# Patient Record
Sex: MALE | Race: WHITE | Employment: OTHER | URBAN - METROPOLITAN AREA
[De-identification: names, ages, dates, MRNs, and addresses within clinical notes are randomized per-mention and may not be internally consistent; named-entity substitution may affect disease eponyms.]

---

## 2018-08-07 ENCOUNTER — HOSPITAL ENCOUNTER (INPATIENT)
Age: 78
LOS: 2 days | Discharge: HOME | DRG: 247 | End: 2018-08-10
Attending: STUDENT IN AN ORGANIZED HEALTH CARE EDUCATION/TRAINING PROGRAM | Admitting: STUDENT IN AN ORGANIZED HEALTH CARE EDUCATION/TRAINING PROGRAM
Payer: MEDICARE

## 2018-08-07 PROCEDURE — 77030004532 HC CATH ANGI DX IMP BSC -A

## 2018-08-07 PROCEDURE — C1894 INTRO/SHEATH, NON-LASER: HCPCS

## 2018-08-07 PROCEDURE — 75810000275 HC EMERGENCY DEPT VISIT NO LEVEL OF CARE

## 2018-08-07 PROCEDURE — 77030013715 HC INFL SYS MRTM -B

## 2018-08-07 PROCEDURE — 77030019569 HC BND COMPR RAD TERU -B

## 2018-08-07 PROCEDURE — 77030010221 HC SPLNT WR POS TELE -B

## 2018-08-07 PROCEDURE — C1887 CATHETER, GUIDING: HCPCS

## 2018-08-07 RX ORDER — CLOPIDOGREL 300 MG/1
300-600 TABLET, FILM COATED ORAL AS NEEDED
Status: DISCONTINUED | OUTPATIENT
Start: 2018-08-07 | End: 2018-08-08

## 2018-08-07 RX ORDER — MIDAZOLAM HYDROCHLORIDE 1 MG/ML
.5-1 INJECTION, SOLUTION INTRAMUSCULAR; INTRAVENOUS AS NEEDED
Status: DISCONTINUED | OUTPATIENT
Start: 2018-08-07 | End: 2018-08-08

## 2018-08-07 RX ORDER — FENTANYL CITRATE 50 UG/ML
25-200 INJECTION, SOLUTION INTRAMUSCULAR; INTRAVENOUS AS NEEDED
Status: DISCONTINUED | OUTPATIENT
Start: 2018-08-07 | End: 2018-08-08

## 2018-08-07 RX ORDER — SODIUM CHLORIDE 9 MG/ML
3 INJECTION, SOLUTION INTRAVENOUS CONTINUOUS
Status: DISPENSED | OUTPATIENT
Start: 2018-08-08 | End: 2018-08-08

## 2018-08-07 RX ORDER — SODIUM CHLORIDE 0.9 % (FLUSH) 0.9 %
5-10 SYRINGE (ML) INJECTION AS NEEDED
Status: DISCONTINUED | OUTPATIENT
Start: 2018-08-07 | End: 2018-08-08

## 2018-08-07 RX ORDER — LIDOCAINE HYDROCHLORIDE 10 MG/ML
10-30 INJECTION INFILTRATION; PERINEURAL
Status: DISCONTINUED | OUTPATIENT
Start: 2018-08-07 | End: 2018-08-08

## 2018-08-07 RX ORDER — PRASUGREL 10 MG/1
10-60 TABLET, FILM COATED ORAL AS NEEDED
Status: DISCONTINUED | OUTPATIENT
Start: 2018-08-07 | End: 2018-08-08

## 2018-08-07 RX ORDER — VERAPAMIL HYDROCHLORIDE 2.5 MG/ML
2.5-5 INJECTION, SOLUTION INTRAVENOUS AS NEEDED
Status: DISCONTINUED | OUTPATIENT
Start: 2018-08-07 | End: 2018-08-08

## 2018-08-07 RX ORDER — HEPARIN SODIUM 1000 [USP'U]/ML
1000-10000 INJECTION, SOLUTION INTRAVENOUS; SUBCUTANEOUS AS NEEDED
Status: DISCONTINUED | OUTPATIENT
Start: 2018-08-07 | End: 2018-08-08

## 2018-08-07 RX ORDER — SODIUM CHLORIDE 9 MG/ML
1.5 INJECTION, SOLUTION INTRAVENOUS CONTINUOUS
Status: DISCONTINUED | OUTPATIENT
Start: 2018-08-08 | End: 2018-08-08 | Stop reason: HOSPADM

## 2018-08-07 RX ORDER — ATROPINE SULFATE 0.1 MG/ML
.5-1 INJECTION INTRAVENOUS AS NEEDED
Status: DISCONTINUED | OUTPATIENT
Start: 2018-08-07 | End: 2018-08-08

## 2018-08-07 RX ORDER — HEPARIN SODIUM 200 [USP'U]/100ML
2000 INJECTION, SOLUTION INTRAVENOUS AS NEEDED
Status: DISCONTINUED | OUTPATIENT
Start: 2018-08-07 | End: 2018-08-08

## 2018-08-07 NOTE — IP AVS SNAPSHOT
[address] [place] [address] 
[phone] Patient: Johnny Obrien MRN: [id number] [facility]:[id number] About your hospitalization You were admitted on:  August 8, 2018 You last received care in the:  [facility] 4 CV SERVICES UNIT You were discharged on:  August 10, 2018 Why you were hospitalized Your primary diagnosis was:  Not on File Your diagnoses also included:  Stemi (St Elevation Myocardial Infarction) (Hcc), Stemi Involving Right Coronary Artery (Hcc) Follow-up Information Follow up With Details Comments Contact Info [address] [place] [address]  one time skilled nursing visit - nurse will call you to set up date/time [facility] [address] 
[phone] Rebeca Hurt NP On 8/15/2018 Hospital f/u PCP appointment Wednesday, 8/15/18 @ 10:30 a.m.  [address] 
[phone] Alf Bradley MD   [address] 
[phone] Your Scheduled Appointments Wednesday August 22, 2018  9:20 AM EDT New Patient with Alf Bradley MD  
[facility] Scar Samson [address] [place] [address] 1301 [facility] 67 [address] [phone] Discharge Orders None A check kory indicates which time of day the medication should be taken. My Medications START taking these medications Instructions Each Dose to Equal  
 Morning Noon Evening Bedtime  
 aspirin 81 mg chewable tablet Start taking on:  8/11/2018 Your last dose was: Your next dose is: Take 1 Tab by mouth daily. 81 mg  
    
   
   
   
  
 atorvastatin 40 mg tablet Commonly known as:  LIPITOR Your last dose was: Your next dose is: Take 1 Tab by mouth nightly. 40 mg  
    
   
   
   
  
 lisinopril 5 mg tablet Commonly known as:  Taylor Meza Start taking on:  8/11/2018 Your last dose was: Your next dose is: Take 1 Tab by mouth daily. 5 mg  
    
   
   
   
  
 ticagrelor 90 mg tablet Commonly known as:  [employer] Your last dose was: Your next dose is: Take 1 Tab by mouth every twelve (12) hours every twelve (12) hours. 90 mg  
    
   
   
   
  
  
STOP taking these medications   
 dilTIAZem 60 mg tablet Commonly known as:  CARDIZEM  
   
  
 lovastatin 20 mg tablet Commonly known as:  MEVACOR  
   
  
 MICARDIS PO Where to Get Your Medications These medications were sent to [facility] [address]  030 [facility] 42 [address] Phone:  [phone]  
  aspirin 81 mg chewable tablet  
 atorvastatin 40 mg tablet  
 lisinopril 5 mg tablet Information on where to get these meds will be given to you by the nurse or doctor. ! Ask your nurse or doctor about these medications  
  ticagrelor 90 mg tablet Discharge Instructions Do not return to work for 1 week. 10 lb weight restriction for 1 week then 50 lb weight lifting restriction You must NOT stop Brillinta without consent from cardiologist.  
 
   
 
 
 
 
Admission Diagnoses: STEMI (ST elevation myocardial infarction) ([facility] STEMI involving right coronary artery ([facility] Discharge Diagnoses: Active Problems: STEMI (ST elevation myocardial infarction) ([address] (8/8/2018) STEMI involving right coronary artery ([facility] (8/8/2018) CARDIAC CATHETERIZATION/PCI DISCHARGE INSTRUCTIONS It is normal to feel tired the first couple days. Take it easy and follow the physicians instructions. CHECK THE CATHETER INSERTION SITE DAILY: 
 
You may shower 24 hours after the procedure, remove the bandage during showering. Wash with soap and water and pat dry. Gentle cleaning of the site with soap and water is sufficient, cover with a dry clean dressing or bandage. Do not apply creams or powders to the area. Do not sit in a bathtub or pool of water for 7 days or until wound has completely healed. Temporary bruising and discomfort is normal and may last a few weeks. You may have a  formation of a small lump at the site which may last up to 6 weeks. CALL THE PHYSICIANS: 
 
If the site becomes red, swollen or feels warm to the touch If there is bleeding or drainage or if there is unusual pain at the groin or down the leg. If there is any bleeding, lie down, apply pressure or have someone apply pressure with a clean cloth until the bleeding stops. If the bleeding continues, call 911 to be transported to the hospital. 
DO  [facility] [address]. ACTIVITY: 
 
For the first 24-48 hours or as instructed by the physician: No lifting, pushing or pulling over 10 pounds and no straining the insertion site. Do not life grocery bags or the garbage can, do not run the vacuum  or  for 7 days. Start with short walks as in the hospital and gradually increase as tolerated each day. It is recommended to walk 30 minutes 5-7 days per week. Follow your physicians instructions on activity. Avoid walking outside in extremes of heat or cold. Walk inside when it is cold and windy or hot and humid. Things to keep in mind: 
No driving for at least 24 hours, or as designated by your physician. Limit the number of times you go up and down the stairs Take rests and pace yourself with activity. Be careful and do not strain with bowel movements. MEDICATIONS: 
 
Take all medications as prescribed Call your physician if you have any questions Keep an updated list of your medications with you at all times and give a list to your physician and pharmacist 
 
 
 
SIGNS AND SYMPTOMS: 
 
 Be cautious of symptoms of angina or recurrent symptoms such as chest discomfort, unusual shortness of breath or fatigue. These could be symptoms of restenosis, a new blockage or a heart attack. If your symptoms are relieved with rest it is still recommended that you notify your physician of recurrent chest pain or discomfort. FOR CHEST PAIN or symptoms of angina not relieved with rest:  If the discomfort is not relieved with rest, and you have been prescribed Nitroglycerin, take as directed (taken under the tongue, one at a time 5 minutes apart for a total of 3 doses). If the discomfort is not relieved after the 3rd nitroglycerin, call 911. If you have not been prescribed Nitroglycerin  and your chest discomfort is not relieved with rest, call 911. AFTER CARE: 
 
Follow up with your physician as instructed. Follow a heart healthy diet with proper portion control, daily stress management, daily exercise, blood pressure and cholesterol control , and smoking cessation. [employer] Announcement We are excited to announce that we are making your provider's discharge notes available to you in [employer]. You will see these notes when they are completed and signed by the physician that discharged you from your recent hospital stay. If you have any questions or concerns about any information you see in [employer], please call the Health Information Department where you were seen or reach out to your Primary Care Provider for more information about your plan of care. Introducing [facility]! Michael Garibay introduces [employer] patient portal. Now you can access parts of your medical record, email your doctor's office, and request medication refills online. 1. In your internet browser, go to [email or web address] 2. Click on the First Time User? Click Here link in the Sign In box. You will see the New Member Sign Up page. 3. Enter your [employer] Access Code exactly as it appears below. You will not need to use this code after youve completed the sign-up process. If you do not sign up before the expiration date, you must request a new code. · [employer] Access Code: [email or web address] Expires: 11/5/2018 10:13 PM 
 
4. Enter the last four digits of your Social Security Number (xxxx) and Date of Birth (mm/dd/yyyy) as indicated and click Submit. You will be taken to the next sign-up page. 5. Create a [employer] ID. This will be your [employer] login ID and cannot be changed, so think of one that is secure and easy to remember. 6. Create a [employer] password. You can change your password at any time. 7. Enter your Password Reset Question and Answer. This can be used at a later time if you forget your password. 8. Enter your e-mail address. You will receive e-mail notification when new information is available in [facility]. 9. Click Sign Up. You can now view and download portions of your medical record. 10. Click the Download Summary menu link to download a portable copy of your medical information. If you have questions, please visit the Frequently Asked Questions section of the [employer] website. Remember, [employer] is NOT to be used for urgent needs. For medical emergencies, dial 911. Now available from your iPhone and Android! Introducing Aries Schmidt As a [employer] patient, I wanted to make you aware of our electronic visit tool called Aries Dumonttrishafederico. [employer] 24/7 allows you to connect within minutes with a medical provider 24 hours a day, seven days a week via a mobile device or tablet or logging into a secure website from your computer. You can access Aries Schmidt from anywhere in the [place].  
 
A virtual visit might be right for you when you have a simple condition and feel like you just dont want to get out of bed, or cant get away from work for an appointment, when your regular [facility] provider is not available (evenings, weekends or holidays), or when youre out of town and need minor care. Electronic visits cost only $49 and if the [employer] 24/7 provider determines a prescription is needed to treat your condition, one can be electronically transmitted to a nearby pharmacy*. Please take a moment to enroll today if you have not already done so. The enrollment process is free and takes just a few minutes. To enroll, please download the [employer] hanny to your tablet or phone, or visit [email or web address] to enroll on your computer. And, as an [address] [place] [address] patient with a [employer] account, the results of your visits will be scanned into your electronic medical record and your primary care provider will be able to view the scanned results. We urge you to continue to see your regular [facility] provider for your ongoing medical care. And while your primary care provider may not be the one available when you seek a [employer] virtual visit, the peace of mind you get from getting a real diagnosis real time can be priceless. For more information on [employer], view our Frequently Asked Questions (FAQs) at [email or web address]. Sincerely, 
 
Jaja Adams MD 
Chief Medical Officer Nhan Kelly Aguillon *:  certain medications cannot be prescribed via [employer] Providers Seen During Your Hospitalization Provider Specialty Primary office phone Rodolfo Keita DO Cardiology [phone] Your Primary Care Physician (PCP) Primary Care Physician Office Phone Office Fax Kai Concepcion [phone] You are allergic to the following No active allergies Recent Documentation Weight BMI Smoking Status 76.4 kg 24.87 kg/m2 Former Smoker Emergency Contacts Name Discharge Info Relation Home Work Mobile Melia Peter DISCHARGE CAREGIVER [3] Daughter [21] [phone] Iza Lowery  Daughter [21] [phone] Gris Jimenez  Daughter [21] [phone] Patient Belongings The following personal items are in your possession at time of discharge: 
  Dental Appliances: None  Visual Aid: None      Home Medications: None   Jewelry: None  Clothing: At bedside Please provide this summary of care documentation to your next provider. Signatures-by signing, you are acknowledging that this After Visit Summary has been reviewed with you and you have received a copy. Patient Signature:  ____________________________________________________________ Date:  ____________________________________________________________  
  
Clau Karena Provider Signature:  ____________________________________________________________ Date:  ____________________________________________________________

## 2018-08-08 PROBLEM — I21.11 STEMI INVOLVING RIGHT CORONARY ARTERY (HCC): Status: ACTIVE | Noted: 2018-08-08

## 2018-08-08 PROBLEM — I21.3 STEMI (ST ELEVATION MYOCARDIAL INFARCTION) (HCC): Status: ACTIVE | Noted: 2018-08-08

## 2018-08-08 LAB
ALBUMIN SERPL-MCNC: 2.9 G/DL (ref 3.5–5)
ALBUMIN/GLOB SERPL: 0.9 {RATIO} (ref 1.1–2.2)
ALP SERPL-CCNC: 76 U/L (ref 45–117)
ALT SERPL-CCNC: 28 U/L (ref 12–78)
ANION GAP SERPL CALC-SCNC: 8 MMOL/L (ref 5–15)
AST SERPL-CCNC: 110 U/L (ref 15–37)
ATRIAL RATE: 67 BPM
BILIRUB DIRECT SERPL-MCNC: <0.1 MG/DL (ref 0–0.2)
BILIRUB SERPL-MCNC: 0.3 MG/DL (ref 0.2–1)
BUN SERPL-MCNC: 16 MG/DL (ref 6–20)
BUN/CREAT SERPL: 20 (ref 12–20)
CALCIUM SERPL-MCNC: 9.1 MG/DL (ref 8.5–10.1)
CALCULATED P AXIS, ECG09: 59 DEGREES
CALCULATED R AXIS, ECG10: -7 DEGREES
CALCULATED T AXIS, ECG11: -26 DEGREES
CHLORIDE SERPL-SCNC: 110 MMOL/L (ref 97–108)
CO2 SERPL-SCNC: 23 MMOL/L (ref 21–32)
CREAT SERPL-MCNC: 0.81 MG/DL (ref 0.7–1.3)
DIAGNOSIS, 93000: NORMAL
ERYTHROCYTE [DISTWIDTH] IN BLOOD BY AUTOMATED COUNT: 13.7 % (ref 11.5–14.5)
EST. AVERAGE GLUCOSE BLD GHB EST-MCNC: 117 MG/DL
GLOBULIN SER CALC-MCNC: 3.3 G/DL (ref 2–4)
GLUCOSE SERPL-MCNC: 133 MG/DL (ref 65–100)
HBA1C MFR BLD: 5.7 % (ref 4.2–6.3)
HCT VFR BLD AUTO: 38.3 % (ref 36.6–50.3)
HGB BLD-MCNC: 12.2 G/DL (ref 12.1–17)
INR PPP: 1 (ref 0.9–1.1)
MAGNESIUM SERPL-MCNC: 2.1 MG/DL (ref 1.6–2.4)
MCH RBC QN AUTO: 30.2 PG (ref 26–34)
MCHC RBC AUTO-ENTMCNC: 31.9 G/DL (ref 30–36.5)
MCV RBC AUTO: 94.8 FL (ref 80–99)
NRBC # BLD: 0 K/UL (ref 0–0.01)
NRBC BLD-RTO: 0 PER 100 WBC
P-R INTERVAL, ECG05: 202 MS
PLATELET # BLD AUTO: 277 K/UL (ref 150–400)
PMV BLD AUTO: 9.9 FL (ref 8.9–12.9)
POTASSIUM SERPL-SCNC: 4 MMOL/L (ref 3.5–5.1)
PROT SERPL-MCNC: 6.2 G/DL (ref 6.4–8.2)
PROTHROMBIN TIME: 10.7 SEC (ref 9–11.1)
Q-T INTERVAL, ECG07: 406 MS
QRS DURATION, ECG06: 100 MS
QTC CALCULATION (BEZET), ECG08: 429 MS
RBC # BLD AUTO: 4.04 M/UL (ref 4.1–5.7)
SODIUM SERPL-SCNC: 141 MMOL/L (ref 136–145)
TROPONIN I SERPL-MCNC: 33.6 NG/ML
VENTRICULAR RATE, ECG03: 67 BPM
WBC # BLD AUTO: 7.6 K/UL (ref 4.1–11.1)

## 2018-08-08 PROCEDURE — 74011250637 HC RX REV CODE- 250/637: Performed by: STUDENT IN AN ORGANIZED HEALTH CARE EDUCATION/TRAINING PROGRAM

## 2018-08-08 PROCEDURE — 77030013744

## 2018-08-08 PROCEDURE — 83036 HEMOGLOBIN GLYCOSYLATED A1C: CPT | Performed by: STUDENT IN AN ORGANIZED HEALTH CARE EDUCATION/TRAINING PROGRAM

## 2018-08-08 PROCEDURE — C1769 GUIDE WIRE: HCPCS

## 2018-08-08 PROCEDURE — 85610 PROTHROMBIN TIME: CPT | Performed by: STUDENT IN AN ORGANIZED HEALTH CARE EDUCATION/TRAINING PROGRAM

## 2018-08-08 PROCEDURE — 93306 TTE W/DOPPLER COMPLETE: CPT

## 2018-08-08 PROCEDURE — 4A023N7 MEASUREMENT OF CARDIAC SAMPLING AND PRESSURE, LEFT HEART, PERCUTANEOUS APPROACH: ICD-10-PCS | Performed by: STUDENT IN AN ORGANIZED HEALTH CARE EDUCATION/TRAINING PROGRAM

## 2018-08-08 PROCEDURE — 80076 HEPATIC FUNCTION PANEL: CPT | Performed by: STUDENT IN AN ORGANIZED HEALTH CARE EDUCATION/TRAINING PROGRAM

## 2018-08-08 PROCEDURE — 85347 COAGULATION TIME ACTIVATED: CPT

## 2018-08-08 PROCEDURE — B2111ZZ FLUOROSCOPY OF MULTIPLE CORONARY ARTERIES USING LOW OSMOLAR CONTRAST: ICD-10-PCS | Performed by: STUDENT IN AN ORGANIZED HEALTH CARE EDUCATION/TRAINING PROGRAM

## 2018-08-08 PROCEDURE — 65620000000 HC RM CCU GENERAL

## 2018-08-08 PROCEDURE — C1725 CATH, TRANSLUMIN NON-LASER: HCPCS

## 2018-08-08 PROCEDURE — 93005 ELECTROCARDIOGRAM TRACING: CPT

## 2018-08-08 PROCEDURE — 93458 L HRT ARTERY/VENTRICLE ANGIO: CPT

## 2018-08-08 PROCEDURE — C1874 STENT, COATED/COV W/DEL SYS: HCPCS

## 2018-08-08 PROCEDURE — 74011000250 HC RX REV CODE- 250: Performed by: STUDENT IN AN ORGANIZED HEALTH CARE EDUCATION/TRAINING PROGRAM

## 2018-08-08 PROCEDURE — 83735 ASSAY OF MAGNESIUM: CPT | Performed by: NURSE PRACTITIONER

## 2018-08-08 PROCEDURE — 74011250636 HC RX REV CODE- 250/636: Performed by: STUDENT IN AN ORGANIZED HEALTH CARE EDUCATION/TRAINING PROGRAM

## 2018-08-08 PROCEDURE — 36415 COLL VENOUS BLD VENIPUNCTURE: CPT | Performed by: STUDENT IN AN ORGANIZED HEALTH CARE EDUCATION/TRAINING PROGRAM

## 2018-08-08 PROCEDURE — 99153 MOD SED SAME PHYS/QHP EA: CPT

## 2018-08-08 PROCEDURE — 74011250636 HC RX REV CODE- 250/636: Performed by: INTERNAL MEDICINE

## 2018-08-08 PROCEDURE — 74011636320 HC RX REV CODE- 636/320: Performed by: STUDENT IN AN ORGANIZED HEALTH CARE EDUCATION/TRAINING PROGRAM

## 2018-08-08 PROCEDURE — 92941 PRQ TRLML REVSC TOT OCCL AMI: CPT

## 2018-08-08 PROCEDURE — 99152 MOD SED SAME PHYS/QHP 5/>YRS: CPT

## 2018-08-08 PROCEDURE — 84484 ASSAY OF TROPONIN QUANT: CPT | Performed by: STUDENT IN AN ORGANIZED HEALTH CARE EDUCATION/TRAINING PROGRAM

## 2018-08-08 PROCEDURE — 027034Z DILATION OF CORONARY ARTERY, ONE ARTERY WITH DRUG-ELUTING INTRALUMINAL DEVICE, PERCUTANEOUS APPROACH: ICD-10-PCS | Performed by: STUDENT IN AN ORGANIZED HEALTH CARE EDUCATION/TRAINING PROGRAM

## 2018-08-08 PROCEDURE — 80048 BASIC METABOLIC PNL TOTAL CA: CPT | Performed by: STUDENT IN AN ORGANIZED HEALTH CARE EDUCATION/TRAINING PROGRAM

## 2018-08-08 PROCEDURE — 85027 COMPLETE CBC AUTOMATED: CPT | Performed by: STUDENT IN AN ORGANIZED HEALTH CARE EDUCATION/TRAINING PROGRAM

## 2018-08-08 RX ORDER — ATORVASTATIN CALCIUM 40 MG/1
80 TABLET, FILM COATED ORAL
Status: DISCONTINUED | OUTPATIENT
Start: 2018-08-08 | End: 2018-08-10

## 2018-08-08 RX ORDER — SODIUM CHLORIDE 0.9 % (FLUSH) 0.9 %
5-10 SYRINGE (ML) INJECTION EVERY 8 HOURS
Status: DISCONTINUED | OUTPATIENT
Start: 2018-08-08 | End: 2018-08-10 | Stop reason: HOSPADM

## 2018-08-08 RX ORDER — ACETAMINOPHEN 500 MG
500 TABLET ORAL
Status: DISCONTINUED | OUTPATIENT
Start: 2018-08-08 | End: 2018-08-10 | Stop reason: HOSPADM

## 2018-08-08 RX ORDER — SODIUM CHLORIDE 9 MG/ML
1.5 INJECTION, SOLUTION INTRAVENOUS CONTINUOUS
Status: DISPENSED | OUTPATIENT
Start: 2018-08-08 | End: 2018-08-08

## 2018-08-08 RX ORDER — GUAIFENESIN 100 MG/5ML
81 LIQUID (ML) ORAL DAILY
Status: DISCONTINUED | OUTPATIENT
Start: 2018-08-09 | End: 2018-08-10 | Stop reason: HOSPADM

## 2018-08-08 RX ORDER — SODIUM CHLORIDE 0.9 % (FLUSH) 0.9 %
5-10 SYRINGE (ML) INJECTION AS NEEDED
Status: DISCONTINUED | OUTPATIENT
Start: 2018-08-08 | End: 2018-08-10 | Stop reason: HOSPADM

## 2018-08-08 RX ORDER — LIDOCAINE HYDROCHLORIDE ANHYDROUS AND DEXTROSE MONOHYDRATE .8; 5 G/100ML; G/100ML
1 INJECTION, SOLUTION INTRAVENOUS CONTINUOUS
Status: DISCONTINUED | OUTPATIENT
Start: 2018-08-08 | End: 2018-08-09

## 2018-08-08 RX ORDER — METOPROLOL SUCCINATE 25 MG/1
25 TABLET, EXTENDED RELEASE ORAL DAILY
Status: DISCONTINUED | OUTPATIENT
Start: 2018-08-08 | End: 2018-08-08

## 2018-08-08 RX ORDER — LISINOPRIL 5 MG/1
5 TABLET ORAL DAILY
Status: DISCONTINUED | OUTPATIENT
Start: 2018-08-08 | End: 2018-08-10 | Stop reason: HOSPADM

## 2018-08-08 RX ORDER — METOPROLOL TARTRATE 25 MG/1
25 TABLET, FILM COATED ORAL 2 TIMES DAILY
Status: DISCONTINUED | OUTPATIENT
Start: 2018-08-08 | End: 2018-08-08

## 2018-08-08 RX ORDER — LOVASTATIN 20 MG/1
20 TABLET ORAL DAILY
COMMUNITY
End: 2018-08-10

## 2018-08-08 RX ORDER — DILTIAZEM HYDROCHLORIDE 60 MG/1
60 TABLET, FILM COATED ORAL 2 TIMES DAILY
COMMUNITY
End: 2018-08-10

## 2018-08-08 RX ADMIN — FENTANYL CITRATE 50 MCG: 50 INJECTION, SOLUTION INTRAMUSCULAR; INTRAVENOUS at 00:23

## 2018-08-08 RX ADMIN — SODIUM CHLORIDE 1.5 ML/KG/HR: 900 INJECTION, SOLUTION INTRAVENOUS at 01:26

## 2018-08-08 RX ADMIN — Medication 10 ML: at 02:13

## 2018-08-08 RX ADMIN — Medication 10 ML: at 21:01

## 2018-08-08 RX ADMIN — LIDOCAINE HYDROCHLORIDE 3 ML: 10 INJECTION, SOLUTION INFILTRATION; PERINEURAL at 00:17

## 2018-08-08 RX ADMIN — Medication 10 ML: at 15:45

## 2018-08-08 RX ADMIN — Medication 2000 UNITS: at 00:22

## 2018-08-08 RX ADMIN — TICAGRELOR 180 MG: 90 TABLET ORAL at 02:10

## 2018-08-08 RX ADMIN — ATROPINE SULFATE 0.5 MG: 0.1 INJECTION, SOLUTION ENDOTRACHEAL; INTRAMUSCULAR; INTRAVENOUS; SUBCUTANEOUS at 00:30

## 2018-08-08 RX ADMIN — TICAGRELOR 90 MG: 90 TABLET ORAL at 15:45

## 2018-08-08 RX ADMIN — Medication 10 ML: at 21:00

## 2018-08-08 RX ADMIN — LISINOPRIL 5 MG: 5 TABLET ORAL at 08:50

## 2018-08-08 RX ADMIN — VERAPAMIL HYDROCHLORIDE 5 MG: 2.5 INJECTION, SOLUTION INTRAVENOUS at 00:05

## 2018-08-08 RX ADMIN — IOPAMIDOL 50 ML: 755 INJECTION, SOLUTION INTRAVENOUS at 00:22

## 2018-08-08 RX ADMIN — MIDAZOLAM HYDROCHLORIDE 2 MG: 1 INJECTION, SOLUTION INTRAMUSCULAR; INTRAVENOUS at 00:23

## 2018-08-08 RX ADMIN — HEPARIN SODIUM 7000 UNITS: 1000 INJECTION, SOLUTION INTRAVENOUS; SUBCUTANEOUS at 00:23

## 2018-08-08 RX ADMIN — Medication 10 ML: at 05:59

## 2018-08-08 RX ADMIN — ATORVASTATIN CALCIUM 80 MG: 40 TABLET, FILM COATED ORAL at 21:00

## 2018-08-08 RX ADMIN — IOPAMIDOL 148 ML: 755 INJECTION, SOLUTION INTRAVENOUS at 00:49

## 2018-08-08 RX ADMIN — SODIUM CHLORIDE 1.5 ML/KG/HR: 900 INJECTION, SOLUTION INTRAVENOUS at 03:00

## 2018-08-08 RX ADMIN — LIDOCAINE HYDROCHLORIDE ANHYDROUS AND DEXTROSE MONOHYDRATE 1 MG/MIN: .8; 5 INJECTION, SOLUTION INTRAVENOUS at 15:38

## 2018-08-08 RX ADMIN — SODIUM CHLORIDE 12.58 ML/KG/HR: 900 INJECTION, SOLUTION INTRAVENOUS at 00:30

## 2018-08-08 RX ADMIN — SODIUM CHLORIDE 3 ML/KG/HR: 900 INJECTION, SOLUTION INTRAVENOUS at 00:00

## 2018-08-08 NOTE — H&P
Alfredo Brooks DO  [facility] [address] [place] [address], [address] [facility], [address] [place] [address]                                       Office [phone],[facility] [phone]    Cardiology Admission Note      2018 11:58 PM  Lord Caridad NP  :  [id number]   MRN:  [id number]         Subjective:   STEMI (ST elevation myocardial infarction) ([address]    Anisa Dumont is a 68 y.o. male presented to [address] [place] [address] ED with 2 hours of sudden onset of substernal chest pain. No prior hx of CAD. No symptoms until abrupt onset. On arrival to ED, found to have ST elevations in inferior leads. Was given SL nitro, heparin bolus and gtt, 600mg plavix and started on nitro gtt. On arrival to [facility] continued to have 1/10 substernal chest pain. Catheterization showed 100% stenosis of distal RCA with dye hangup and collateral flow to distal vessel from left coronary artery. Underwent successful PCI with a 2.5x18mm Hanahan BARRIE. No Known Allergies      Past Medical History:   Diagnosis Date    Hypercholesterolemia     Hypertension     Sciatica         History reviewed. No pertinent surgical history. Home Medications:  Prior to Admission Medications   Prescriptions Last Dose Informant Patient Reported? Taking? TELMISARTAN (MICARDIS PO)   Yes No   Sig: Take  by mouth.       Facility-Administered Medications: None       Hospital Medications:  Current Facility-Administered Medications   Medication Dose Route Frequency    sodium chloride (NS) flush 5-10 mL  5-10 mL IntraVENous Q8H    sodium chloride (NS) flush 5-10 mL  5-10 mL IntraVENous PRN    sodium chloride (NS) flush 5-10 mL  5-10 mL IntraVENous Q8H    sodium chloride (NS) flush 5-10 mL  5-10 mL IntraVENous PRN    acetaminophen (TYLENOL) tablet 500 mg  500 mg Oral Q6H PRN    metoprolol tartrate (LOPRESSOR) tablet 25 mg  25 mg Oral BID    [START ON 2018] aspirin chewable tablet 81 mg  81 mg Oral DAILY    ticagrelor (BRILINTA) tablet 180 mg  180 mg Oral ONCE    ticagrelor (BRILINTA) tablet 90 mg  90 mg Oral Q12H    atorvastatin (LIPITOR) tablet 80 mg  80 mg Oral QHS    lisinopril (PRINIVIL, ZESTRIL) tablet 5 mg  5 mg Oral DAILY       Prior to Admission Medications   Prescriptions Last Dose Informant Patient Reported? Taking? TELMISARTAN (MICARDIS PO)   Yes No   Sig: Take  by mouth. Facility-Administered Medications: None          Social History:  Social History   Substance Use Topics    Smoking status: Former Smoker    Smokeless tobacco: Never Used    Alcohol use No       Family History:  History reviewed. No pertinent family history. Review of Symptoms:  Pertinent Positive:chest pain  Pertinent Negative: shortness of breath  All Other systems reviewed and are negative for a Comprehensive ROS (10+)    Blood pressure (!) 113/7, pulse 76, resp. rate 11, SpO2 99 %. Constitutional:  well-developed and well-nourished. No distress. HENT: Normocephalic. Eyes: No scleral icterus. Neck:  Neck supple. No JVD present. Pulmonary/Chest: Effort normal and breath sounds normal. No respiratory distress, wheezes or rales. Cardiovascular: Normal rate, regular rhythm, S1 S2 . Exam reveals no gallop and no friction rub. No murmur heard. No edema. Extremities:  Normal muscle tone. Abdominal:   No abnormal distension. Neurological:  Alert and oriented. Coordination seems grossly normal.   Skin: Skin is not cold. No rash noted. Not diaphoretic. No erythema. Psychiatric:  Grossly normal mood and affect. Behavior appears normal.       Cardiac Testing/ Procedures:  EKG: sinus tachycardia, ischemic changes noted in inferior leads with ST elevations and diffuse ST segment depression. Cardiac Cath/PCI:  100% distal RCA stenosis with dye hangup.       LABS:    Lab Results   Component Value Date/Time    WBC 6.7 08/07/2018 10:30 PM    HGB 13.9 08/07/2018 10:30 PM    HCT 42.2 08/07/2018 10:30 PM    PLATELET 936 08/07/2018 10:30 PM    MCV 90.2 08/07/2018 10:30 PM     Lab Results   Component Value Date/Time    Sodium 139 08/07/2018 10:30 PM    Potassium 3.5 08/07/2018 10:30 PM    Chloride 103 08/07/2018 10:30 PM    CO2 24 08/07/2018 10:30 PM    Anion gap 12 08/07/2018 10:30 PM    Glucose 206 (H) 08/07/2018 10:30 PM    BUN 16 08/07/2018 10:30 PM    Creatinine 1.08 08/07/2018 10:30 PM    BUN/Creatinine ratio 15 08/07/2018 10:30 PM    GFR est AA >60 08/07/2018 10:30 PM    GFR est non-AA >60 08/07/2018 10:30 PM    Calcium 10.3 (H) 08/07/2018 10:30 PM     Lab Results   Component Value Date/Time    Troponin-I, Qt. 0.36 (H) 08/07/2018 10:30 PM     No results found for: APTT  No results found for: INR, PTMR, PTP, PT1, PT2     Assessment/Plan:     Patient Active Problem List   Diagnosis Code    Sciatica of left side M54.32    Hypertension I10    STEMI (ST elevation myocardial infarction) ([facility] [facility] I21.3         STEMI- distal RCA  S/p stenting with a 2.5x18mm medtronic Moises BARRIE  - s/p clopidogrel load at OSH ED, ticagrelor 180mg in am followed by 90 mg twice daily  - start low dose metorplol and acei  - lipid panel  - echo  - [facility]        I have discussed the diagnosis with the patient and the intended plan as seen in the above orders.           Arejames Flair, DO

## 2018-08-08 NOTE — PROGRESS NOTES
Reason for Admission: \"Heart Attack\"; STEMI                   RRAT Score:         12            Plan for utilizing home health:      TBD: patient wants to discuss [facility] visit with his daughters before deciding on whether or not to do the visit; NOTE: Patient lives in the [facility], so referral will need to be sent to [facility]                    Likelihood of Readmission:  Low, patient primarily uses [facility] as primary hospital                         Transition of Care Plan:                Patient was flown in from [facility] yesterday, 8/7/18. Patient received one stent. Patient will likely be transferred to a step down unit tomorrow, 8/9/18. Patient is eligible for [facility] visit but wants to discuss with his daughters first, as they are very involved with his care. Patient identified [facility] in [place] as pharmacy preference. Care Management Interventions  PCP Verified by CM: Yes (Last saw NP Gold Ludwig yesterday)  Last Visit to PCP: 08/07/18  Palliative Care Criteria Met (RRAT>21 & CHF Dx)?: No  Mode of Transport at Discharge:  Other (see comment) (Daughters will transport patient home)  Transition of Care Consult (CM Consult): Discharge Planning (Patient eligible for [facility] visit but want to discuss it with daughters)  Kenneth Senate: No  Discharge Durable Medical Equipment: No (has DME of: walker, shower stool)  Health Maintenance Reviewed: Yes (CM met with patient with patient alert and oriented x 4)  Physical Therapy Consult: No  Occupational Therapy Consult: No  Speech Therapy Consult: No  Current Support Network: Own Home, Family Lives Nearby, Lives Alone (3 daughters are very involved)  Confirm Follow Up Transport: Self (Patient is independent in ADLs, to include driving)  Plan discussed with Pt/Family/Caregiver: Yes  Freedom of Choice Offered: Yes (eligible for [facility] visit but wants to discuss with daughters first)  Lorin Provided?: No  Discharge Location  Discharge Placement: Home with family assistance (Lives alone in a single story home with 3 external steps)    CRM: Reagan Romero MPH; Z: [phone]

## 2018-08-08 NOTE — PROGRESS NOTES
Problem: Falls - Risk of  Goal: *Absence of Falls  Document Fabiola Fall Risk and appropriate interventions in the flowsheet. Outcome: Progressing Towards Goal  Fall Risk Interventions:            Medication Interventions: Assess postural VS orthostatic hypotension, Evaluate medications/consider consulting pharmacy, Patient to call before getting OOB, Teach patient to arise slowly                  Problem: Pressure Injury - Risk of  Goal: *Prevention of pressure injury  Document Flaquito Scale and appropriate interventions in the flowsheet. Outcome: Progressing Towards Goal  Pressure Injury Interventions:             Activity Interventions: Pressure redistribution bed/mattress(bed type)    Mobility Interventions: Assess need for specialty bed    Nutrition Interventions: Document food/fluid/supplement intake

## 2018-08-08 NOTE — ED TRIAGE NOTES
Transfer from [address] [place] [address], Code STEMI, Lidia Sham and cath lab in ED upon arrival.

## 2018-08-08 NOTE — CONSULTS
Cardiac Electrophysiology Consultation Note   REFERRING PROVIDER: Dr Rylan Young  Subjective:      Leeann Lechuga is a 68 y.o. patient who is seen for evaluation of PVC and NSVT and sinus bradycardia  He presented to outside hospital ER with acute STEMI inferior wall last night and was transferred here for cath which showed RCA occlusion with L to right collateral flow.  PCI was done with BARRIE Medtronic  He has no CAD in other vessel  LVEF on echo was 50% and mild MR  He has no chest pain now, nor dizziness or syncope but he has sinus bradycardia 40-50 bpm and PVC, monomorphic VT  No family hx of sudden death, pacer or ICD     Post PCI ECG showed sinus arrhythmia, inferior Q waves and PVC       Patient Active Problem List   Diagnosis Code    Sciatica of left side M54.32    Hypertension I10    STEMI (ST elevation myocardial infarction) ([facility] [address] I21.3    STEMI involving right coronary artery ([facility] [address] I21.11     Current Facility-Administered Medications on File Prior to Encounter   Medication Dose Route Frequency Provider Last Rate Last Dose    [COMPLETED] aspirin (ASPIRIN) tablet 325 mg  325 mg Oral ONCE Gae Lesches, MD   325 mg at 08/07/18 2217    [COMPLETED] nitroglycerin (NITROSTAT) tablet 0.4 mg  0.4 mg SubLINGual NOW Gae Lesches, MD   0.4 mg at 08/07/18 2226    [COMPLETED] sodium chloride 0.9 % bolus infusion 500 mL  500 mL IntraVENous NOW Gae Lesches, MD   Stopped at 08/07/18 2301    [COMPLETED] clopidogrel (PLAVIX) tablet 600 mg  600 mg Oral NOW Gae Lesches, MD   600 mg at 08/07/18 2243    [COMPLETED] metoprolol (LOPRESSOR) injection 5 mg  5 mg IntraVENous NOW Gae Lesches, MD   5 mg at 08/07/18 2244    [COMPLETED] ondansetron (ZOFRAN) injection 4 mg  4 mg IntraVENous NOW Gae Lesches, MD   4 mg at 08/07/18 2250    [COMPLETED] morphine injection 4 mg  4 mg IntraVENous NOW Gae Lesches, MD   4 mg at 08/07/18 2250    [COMPLETED] nitroglycerin (NITROSTAT) tablet 0.4 mg  0.4 mg SubLINGual NOW Arleen Severance, MD   0.4 mg at 08/07/18 2237     Current Outpatient Prescriptions on File Prior to Encounter   Medication Sig Dispense Refill    TELMISARTAN (MICARDIS PO) Take 80 mg by mouth daily. Current Facility-Administered Medications   Medication Dose Route Frequency Provider Last Rate Last Dose    sodium chloride (NS) flush 5-10 mL  5-10 mL IntraVENous Q8H [place] Heckle, DO   10 mL at 08/08/18 1545    sodium chloride (NS) flush 5-10 mL  5-10 mL IntraVENous PRN [place] Heckle, DO        sodium chloride (NS) flush 5-10 mL  5-10 mL IntraVENous Q8H [place] Heckle, DO   10 mL at 08/08/18 1545    sodium chloride (NS) flush 5-10 mL  5-10 mL IntraVENous PRN [place] Heckle, DO        acetaminophen (TYLENOL) tablet 500 mg  500 mg Oral Q6H PRN [place] Heckle, DO        [START ON 8/9/2018] aspirin chewable tablet 81 mg  81 mg Oral DAILY [place] Heckle, DO        ticagror [facility]) tablet 90 mg  90 mg Oral Q12H [place] Heckle, DO   90 mg at 08/08/18 1545    atorvastatin (LIPITOR) tablet 80 mg  80 mg Oral QHS [place] Heckle, DO   Stopped at 08/08/18 0118    lisinopril (PRINIVIL, ZESTRIL) tablet 5 mg  5 mg Oral DAILY [place] Heckle, DO   5 mg at 08/08/18 2354    lidocaine 8 mg/mL (Xylocaine) infusion  1 mg/min IntraVENous CONTINUOUS Dea Noriega MD 7.5 mL/hr at 08/08/18 1538 1 mg/min at 08/08/18 1538     No Known Allergies  Past Medical History:   Diagnosis Date    Hypercholesterolemia     Hypertension     Sciatica      No past surgical history. Pertinent family history is in HPI. Social History   Substance Use Topics    Smoking status: Former Smoker    Smokeless tobacco: Never Used    Alcohol use No        Review of Systems:   Constitutional: Negative for fever, chills, weight loss, malaise/fatigue. HEENT: Negative for nosebleeds, vision changes. Respiratory: Negative for cough, hemoptysis  Cardiovascular: Negative for chest pain, palpitations, orthopnea, claudication, leg swelling, syncope, and PND. Gastrointestinal: Negative for nausea, vomiting, diarrhea, blood in stool and melena. Genitourinary: Negative for dysuria, and hematuria. Musculoskeletal: Negative for myalgias, arthralgia. Skin: Negative for rash. Heme: Does not bleed or bruise easily. Neurological: Negative for speech change and focal weakness     Objective:     Visit Vitals    /67    Pulse (!) 43    Temp 98.4 °F (36.9 °C)    Resp 17    Wt 169 lb 12.1 oz (77 kg)    SpO2 94%    BMI 25.07 kg/m2      Physical Exam:   Constitutional: well-developed and well-nourished. No respiratory distress. Head: Normocephalic and atraumatic. Eyes: Pupils are equal, round  ENT: hearing normal  Neck: supple. No JVD present. Cardiovascular: slow rate, irregular rhythm. Exam reveals no gallop and no friction rub. No murmur heard. Pulmonary/Chest: Effort normal and breath sounds normal. No wheezes. Abdominal: Soft, no tenderness. Musculoskeletal: no edema. Neurological: alert,oriented. Skin: Skin is warm and dry  Psychiatric: normal mood and affect.  Behavior is normal. Judgment and thought content normal.      BMP:   Lab Results   Component Value Date/Time     08/08/2018 02:01 AM    K 4.0 08/08/2018 02:01 AM     (H) 08/08/2018 02:01 AM    CO2 23 08/08/2018 02:01 AM    AGAP 8 08/08/2018 02:01 AM     (H) 08/08/2018 02:01 AM    BUN 16 08/08/2018 02:01 AM    CREA 0.81 08/08/2018 02:01 AM    GFRAA >60 08/08/2018 02:01 AM    GFRNA >60 08/08/2018 02:01 AM     CBC:   Lab Results   Component Value Date/Time    WBC 7.6 08/08/2018 02:01 AM    HGB 12.2 08/08/2018 02:01 AM    HCT 38.3 08/08/2018 02:01 AM     08/08/2018 02:01 AM     COAGS:   Lab Results   Component Value Date/Time    PTP 10.7 08/08/2018 02:01 AM    INR 1.0 08/08/2018 02:01 AM       Assessment/Plan:   STEMI RCA stent  LVEF 50%  Hypertension  PVC frequent  NSVT  Sinus bradycardia    He cannot tolerate beta blocker due to severe sinus bradycardia  He does not appear to have ischemia at this time  His ECG indicated that he has transmural inferior MI and scar substrate and hence monomorphic VT. He will not tolerate beta blocker or amiodarone. I will try low dose lidocaine and monitor his sinus rate and VT burden  If he is symptomatic and VT is persistently recurrent, will consider to re-cath, or EP study and dual chamber ICD implant  If he has no more VT but severe sinus bradycardia persists with symptom: will consider dual chamber pacemaker  I have explained to him        Thank you for involving me in this patient's care and please call with further concerns or questions. Akanksha Robledo M.D.   Electrophysiology/Cardiology  [facility] [place]  [address], [address] [place] [address]  [phone]

## 2018-08-08 NOTE — PROGRESS NOTES
Problem: Discharge Planning  Goal: *Discharge to safe environment  Outcome: Progressing Towards Goal  See CM Notes CRM: Osmin Garcia, MPH; Z: [phone]

## 2018-08-08 NOTE — INTERDISCIPLINARY ROUNDS
IDR/SLIDR Summary          Patient: Darrel Reece MRN: [id number]    Age: 68 y.o. YOB: 1940 Room/Bed: [address] [place] [address]   Admit Diagnosis: STEMI (ST elevation myocardial infarction) ([facility] [address]  STEMI involving right coronary artery (HCC)  Principal Diagnosis: <principal problem not specified>   Goals: resolve MI  Readmission: NO  Quality Measure: AMI  VTE Prophylaxis: Chemical  Influenza Vaccine screening completed? NO  Pneumococcal Vaccine screening completed? NO  Mobility needs: Yes   Nutrition plan:Yes  Consults:P.T, O.T. and Case Management    Financial concerns:Yes  Escalated to CM? YES  RRAT Score: 12   Interventions:H2H  Testing due for pt today?  YES  LOS: 0 days Expected length of stay TBD days  Discharge plan: TBD   PCP: Lian Ambriz NP  Transportation needs: Yes    Days before discharge:two or more days before discharge   Discharge disposition: Home    Signed:     Rose Marie Hernandez RN  8/8/2018  7:21 AM

## 2018-08-08 NOTE — CARDIO/PULMONARY
Cardiac Rehab: MI education folder, with catheterization brochure, to bedside of [address] [place] [address]. Educated using teach back method. Reviewed MI diagnosis definition and purpose of intervention. Discussed risk factors for CAD to include the following: family history, elevated BMI, hyperlipidemia, hypertension, diabetes, stress, and smoking. Discussed Heart Healthy/Low Sodium (2000 mg) diet. Reviewed the importance of medication compliance, the purpose of brinlinta, and potential side effects. Discussed follow up appointments with cardiologist, signs and symptoms of angina, and what to report to physician after discharge. Emphasized the value of cardiac rehab. Discussed Cardiac Rehab Program format, benefits, and encouraged enrollment to assist with risk modification and management. 2000 Jaron Wiley lives out of the area without a Cardiac Rehab nearby so will exercise on his own. [address] [place] [address] Saurabh verbalized understanding with questions answered.   Wendy Joe RN

## 2018-08-08 NOTE — PROGRESS NOTES
Transfer to CCU from Procedure Area    Verbal report given to Rocky Quinones RN CCU on [facility] being transferred to CCU for routine progression of care   Patient is post left heart cath with intervention procedure. Patient stable upon transfer to CCU. Report consisted of patients Situation, Background, Assessment and   Recommendations(SBAR). Information from the following report(s) SBAR, Kardex, Procedure Summary, Intake/Output, MAR and Recent Results was reviewed with the receiving nurse. Opportunity for questions and clarification was provided. Patient medicated during procedure with orders obtained and verified by Dr. Papo Bashir. Refer to patient PROCEDURE REPORT for vital signs, assessment, status, and response during procedure.

## 2018-08-08 NOTE — PROGRESS NOTES
0800:  Report received from [employer] Toprol held per MD due to bradycardia. 1200:  Pt sitting on side of bed, eating lunch. Remains bradycardic. Frequent PVC's.    1500:  Pt continues to have frequent and increasing ectopy and 2 runs of 10 beats of Vtach. MD notified. 1600:  Lidocaine initiated per order. 1700:  Pt family at bedside. Questions answered. Family to meet with Dr. Donna Peacock tomorrow at 3 pm.      2000:  Bedside shift change report given to [employer] (oncoming nurse) by Lurdes Benson RN (offgoing nurse). Report included the following information SBAR, Kardex, MAR and Recent Results. Problem: Falls - Risk of  Goal: *Absence of Falls  Document Fabiola Fall Risk and appropriate interventions in the flowsheet. Outcome: Progressing Towards Goal  Fall Risk Interventions:            Medication Interventions: Assess postural VS orthostatic hypotension                  Problem: Pressure Injury - Risk of  Goal: *Prevention of pressure injury  Document Flaquito Scale and appropriate interventions in the flowsheet. Outcome: Progressing Towards Goal  Pressure Injury Interventions:             Activity Interventions: Increase time out of bed, Pressure redistribution bed/mattress(bed type)    Mobility Interventions: HOB 30 degrees or less, Pressure redistribution bed/mattress (bed type)    Nutrition Interventions: Document food/fluid/supplement intake

## 2018-08-08 NOTE — PROGRESS NOTES
0400: removed 3cc of air from TR band. 8cc remain. 0791: removed 3cc of air from TR band. 5cc remain. 0430: removed 3cc of air from TR band. 2cc remain. 0445: removed 2cc of air from TR band. Deflated band left in place.

## 2018-08-08 NOTE — PROGRESS NOTES
STEMI last night. NSVT and chris on monitor last night. Trop 33. Watch in CCU today. Lipids and change to po toprol. Post MI echo. Full note to follow.

## 2018-08-08 NOTE — PROGRESS NOTES
TRANSFER - IN REPORT:    Verbal report received from Leticia(name) on Papa Rosales  being received from Cath Lab(unit) for routine progression of care      Report consisted of patients Situation, Background, Assessment and   Recommendations(SBAR). Information from the following report(s) SBAR, Kardex, Procedure Summary, Recent Results, Med Rec Status and Cardiac Rhythm NSR was reviewed with the receiving nurse. Opportunity for questions and clarification was provided. Assessment completed upon patients arrival to unit and care assumed.      Primary Nurse Davian Cervantes RN and Terrell Mcnamara RN performed a dual skin assessment on this patient No impairment noted    Current Bed:     Total Care (foam)    Flaquito score is 23

## 2018-08-08 NOTE — PROCEDURES
BRIEF PROCEDURE NOTE    Date of Procedure: 8/8/2018   Preoperative Diagnosis: stemi  Postoperative Diagnosis: stemi    Procedure: Left heart cath, coronary angiographym, PCI with stent placement  Interventional Cardiologist: Naveen Varela DO  Anesthesia: local + IV moderate sedation   Estimated Blood Loss: Minimal    Access: left radial 6F  Catheters:  Left coronary: JL4 5F  Right coronary: JR4 5F    Findings:   L Main: no significant disease  LAD:no significant disease  LCx:no significant disease  RCA: 100% distal RCA stenosis, dye hang-up, distal vessel with left to right collaterals    LVEDP:  15mmhg    PCI:  Heparin  BMW  2.10mm compliant balloon 12 ABE  2.5x18mm Medtronic Moises BARRIE 14 ABE  2.5x15mm NC balloon 16 ABE  Specimens Removed : None    Closure Device: radial TR band    See full cath note.     Complications: none    DO Larry Lloyd DO  [facility] [address]                                       Office [phone],TON[place] [phone]

## 2018-08-09 LAB
ANION GAP SERPL CALC-SCNC: 6 MMOL/L (ref 5–15)
BUN SERPL-MCNC: 17 MG/DL (ref 6–20)
BUN/CREAT SERPL: 19 (ref 12–20)
CALCIUM SERPL-MCNC: 9.4 MG/DL (ref 8.5–10.1)
CHLORIDE SERPL-SCNC: 111 MMOL/L (ref 97–108)
CHOLEST SERPL-MCNC: 123 MG/DL
CO2 SERPL-SCNC: 25 MMOL/L (ref 21–32)
CREAT SERPL-MCNC: 0.88 MG/DL (ref 0.7–1.3)
GLUCOSE SERPL-MCNC: 94 MG/DL (ref 65–100)
HDLC SERPL-MCNC: 31 MG/DL
HDLC SERPL: 4 {RATIO} (ref 0–5)
LDLC SERPL CALC-MCNC: 67.4 MG/DL (ref 0–100)
LIPID PROFILE,FLP: NORMAL
MAGNESIUM SERPL-MCNC: 2 MG/DL (ref 1.6–2.4)
POTASSIUM SERPL-SCNC: 3.8 MMOL/L (ref 3.5–5.1)
SODIUM SERPL-SCNC: 142 MMOL/L (ref 136–145)
TRIGL SERPL-MCNC: 123 MG/DL (ref ?–150)
VLDLC SERPL CALC-MCNC: 24.6 MG/DL

## 2018-08-09 PROCEDURE — 74011250637 HC RX REV CODE- 250/637: Performed by: STUDENT IN AN ORGANIZED HEALTH CARE EDUCATION/TRAINING PROGRAM

## 2018-08-09 PROCEDURE — 80048 BASIC METABOLIC PNL TOTAL CA: CPT | Performed by: NURSE PRACTITIONER

## 2018-08-09 PROCEDURE — 74011250637 HC RX REV CODE- 250/637: Performed by: NURSE PRACTITIONER

## 2018-08-09 PROCEDURE — 36415 COLL VENOUS BLD VENIPUNCTURE: CPT | Performed by: NURSE PRACTITIONER

## 2018-08-09 PROCEDURE — 65660000000 HC RM CCU STEPDOWN

## 2018-08-09 PROCEDURE — 83735 ASSAY OF MAGNESIUM: CPT | Performed by: NURSE PRACTITIONER

## 2018-08-09 PROCEDURE — 77010033678 HC OXYGEN DAILY

## 2018-08-09 PROCEDURE — 80061 LIPID PANEL: CPT | Performed by: NURSE PRACTITIONER

## 2018-08-09 RX ORDER — POTASSIUM CHLORIDE 750 MG/1
20 TABLET, FILM COATED, EXTENDED RELEASE ORAL
Status: COMPLETED | OUTPATIENT
Start: 2018-08-09 | End: 2018-08-09

## 2018-08-09 RX ADMIN — Medication 10 ML: at 22:14

## 2018-08-09 RX ADMIN — Medication 10 ML: at 05:18

## 2018-08-09 RX ADMIN — LISINOPRIL 5 MG: 5 TABLET ORAL at 09:05

## 2018-08-09 RX ADMIN — POTASSIUM CHLORIDE 20 MEQ: 750 TABLET, EXTENDED RELEASE ORAL at 09:05

## 2018-08-09 RX ADMIN — ATORVASTATIN CALCIUM 80 MG: 40 TABLET, FILM COATED ORAL at 22:13

## 2018-08-09 RX ADMIN — TICAGRELOR 90 MG: 90 TABLET ORAL at 12:05

## 2018-08-09 RX ADMIN — TICAGRELOR 90 MG: 90 TABLET ORAL at 01:09

## 2018-08-09 RX ADMIN — ASPIRIN 81 MG CHEWABLE TABLET 81 MG: 81 TABLET CHEWABLE at 09:05

## 2018-08-09 RX ADMIN — TICAGRELOR 90 MG: 90 TABLET ORAL at 22:13

## 2018-08-09 NOTE — PROGRESS NOTES
1900: TRANSFER - IN REPORT:    Verbal report received from Maria De Jesus Padilla on Shailesh Rich  being received from CCU(unit) for routine progression of care      Report consisted of patients Situation, Background, Assessment and   Recommendations(SBAR). Information from the following report(s) SBAR, Procedure Summary, Intake/Output, MAR, Recent Results and Cardiac Rhythm SR was reviewed with the receiving nurse. Opportunity for questions and clarification was provided. Assessment completed upon patients arrival to unit and care assumed. 1937: Pt arrived on unit. Placed on tele and confirmed with monitor tech. Bedside report given to [facility]. Information from the following report(s) SBAR, Procedure Summary, Intake/Output, MAR, Recent Results and Cardiac Rhythm SR was reviewed with the receiving nurse. Opportunity for questions and clarification was provided.

## 2018-08-09 NOTE — PROGRESS NOTES
0800:  Report received from [employer]. Pt resting. Lidocaine turned off.     1200:  Pt sitting up for lunch. Pt has had PVC's but no runs of Vtach    1600:  Family met with Dr. Jessica Cooper. Pt walked in halls with nurse. Tolerated well.     1800: Pt walked. Tolerated well.     2000: TRANSFER - OUT REPORT:    Verbal report given to [facility] RN(name) on [employer]  being transferred to [facility](unit) for routine progression of care       Report consisted of patients Situation, Background, Assessment and   Recommendations(SBAR). Information from the following report(s) SBAR, Kardex, [facility] and Recent Results was reviewed with the receiving nurse. Lines:   Peripheral IV 08/07/18 Right Forearm (Active)   Site Assessment Clean, dry, & intact 8/9/2018  4:00 PM   Phlebitis Assessment 0 8/9/2018  4:00 PM   Infiltration Assessment 0 8/9/2018  4:00 PM   Dressing Status Clean, dry, & intact 8/9/2018  4:00 PM   Dressing Type Transparent 8/9/2018  4:00 PM   Hub Color/Line Status Green;Flushed 8/9/2018  4:00 PM   Action Taken Open ports on tubing capped 8/9/2018  4:00 PM   Alcohol Cap Used Yes 8/9/2018  4:00 PM       Peripheral IV 08/08/18 Left Forearm (Active)   Site Assessment Clean, dry, & intact 8/9/2018  4:00 PM   Phlebitis Assessment 0 8/9/2018  4:00 PM   Infiltration Assessment 0 8/9/2018  4:00 PM   Dressing Status Clean, dry, & intact 8/9/2018  4:00 PM   Dressing Type Transparent 8/9/2018  4:00 PM   Hub Color/Line Status Pink; Infusing 8/9/2018  4:00 PM   Action Taken Open ports on tubing capped 8/9/2018  4:00 PM   Alcohol Cap Used Yes 8/9/2018  4:00 PM        Opportunity for questions and clarification was provided. Patient transported with:   Monitor  Registered Nurse          Problem: Falls - Risk of  Goal: *Absence of Falls  Document Fabiola Fall Risk and appropriate interventions in the flowsheet.    Outcome: Progressing Towards Goal  Fall Risk Interventions:            Medication Interventions: Evaluate medications/consider consulting pharmacy                  Problem: Pressure Injury - Risk of  Goal: *Prevention of pressure injury  Document Flaquito Scale and appropriate interventions in the flowsheet. Outcome: Progressing Towards Goal  Pressure Injury Interventions:             Activity Interventions: Increase time out of bed, Pressure redistribution bed/mattress(bed type)    Mobility Interventions: HOB 30 degrees or less, Pressure redistribution bed/mattress (bed type)    Nutrition Interventions: Document food/fluid/supplement intake

## 2018-08-09 NOTE — INTERDISCIPLINARY ROUNDS
IDR/SLIDR Summary          Patient: Hortencia Mack MRN: [id number]    Age: 68 y.o. YOB: 1940 Room/Bed: [address] [place] [address]   Admit Diagnosis: STEMI (ST elevation myocardial infarction) ([facility] [address]  STEMI involving right coronary artery (HCC)  Principal Diagnosis: <principal problem not specified>   Goals: resolve MI  Readmission: NO  Quality Measure: AMI  VTE Prophylaxis: Chemical  Influenza Vaccine screening completed? NO  Pneumococcal Vaccine screening completed? NO  Mobility needs: Yes   Nutrition plan:Yes  Consults:P.T, O.T. and Case Management    Financial concerns:Yes  Escalated to CM? YES  RRAT Score: 12   Interventions:H2H  Testing due for pt today?  YES  LOS: 0 days Expected length of stay TBD days  Discharge plan: TBD   PCP: Sandy Gutiérrez NP  Transportation needs: Yes    Days before discharge:two or more days before discharge   Discharge disposition: Home    Signed:     Hallie Acosta RN  8/8/2018  7:21 AM

## 2018-08-09 NOTE — PROGRESS NOTES
Cardiac Electrophysiology Hospital Progress Note     Subjective:      Anali Lozano is a 68 y.o. patient who was initially seen yesterday for evaluation of PVCs, NSVT, & sinus bradycardia. He presented to [facility] ED with acute STEMI of inferior wall on 08/07/2018, transferred to [facility] for LHC, which showed RCA occlusion with left to right collateral flow. Successful PCI RCA via BARRIE. No other CAD noted. Post procedure, he remained bradycardic (40-50 bpm) with frequent monomorphic PVCs & monomorphic VT. Post PCI ECG showed sinus arrhythmia, inferior Q waves, & PVCs. Lidocaine gtt was initiated, with less VT noted, but he continues with frequent monomorphic PVCs. He continues with sinus bradycardia, rates 40s-50s at rest, increased to 70 bpm with movement during exam.  Last /54. He denies current chest pain or dizziness. He vocalizes that he is hoping for discharge tomorrow. Family should be in later today. Echo (08/08/2018): LVEF 50%, no regional wall motion abnormalities. Mild MR.       Problem List  Date Reviewed: 3/14/2014          Codes Class Noted    STEMI (ST elevation myocardial infarction) ([facility] [address] ICD-10-CM: I21.3  ICD-9-CM: 410.90  8/8/2018        STEMI involving right coronary artery [facility]) ICD-10-CM: I21.11  ICD-9-CM: 410.31  8/8/2018        Sciatica of left side ICD-10-CM: M54.32  ICD-9-CM: 724.3  3/14/2014        Hypertension ICD-10-CM: I10  ICD-9-CM: 401.9  3/14/2014              Current Facility-Administered Medications   Medication Dose Route Frequency Provider Last Rate Last Dose    sodium chloride (NS) flush 5-10 mL  5-10 mL IntraVENous Q8H Charlett Scales, DO   10 mL at 08/09/18 0518    sodium chloride (NS) flush 5-10 mL  5-10 mL IntraVENous PRN Charlett Scales, DO        sodium chloride (NS) flush 5-10 mL  5-10 mL IntraVENous Q8H Charlett Scales, DO   10 mL at 08/09/18 0518    sodium chloride (NS) flush 5-10 mL  5-10 mL IntraVENous PRN Charlett Scales, DO        acetaminophen (TYLENOL) tablet 500 mg  500 mg Oral Q6H PRN Patsie Hilts, DO        aspirin chewable tablet 81 mg  81 mg Oral DAILY Patsie Hilts, DO        ticagrelor [facility]) tablet 90 mg  90 mg Oral Q12H Patsie Hilts, DO   90 mg at 08/09/18 0109    atorvastatin (LIPITOR) tablet 80 mg  80 mg Oral QHS Patsie Hilts, DO   80 mg at 08/08/18 2100    lisinopril (PRINIVIL, ZESTRIL) tablet 5 mg  5 mg Oral DAILY Patsie Hilts, DO   5 mg at 08/08/18 9893     No Known Allergies  Past Medical History:   Diagnosis Date    Hypercholesterolemia     Hypertension     Sciatica        Social History   Substance Use Topics    Smoking status: Former Smoker    Smokeless tobacco: Never Used    Alcohol use No        Review of Systems:   Constitutional: Negative for fever, chills, weight loss, malaise/fatigue. HEENT: Negative for nosebleeds, vision changes. Respiratory: Negative for cough, hemoptysis  Cardiovascular: Negative for current chest pain, palpitations, orthopnea, claudication, leg swelling, syncope, and PND. Gastrointestinal: Negative for nausea, vomiting, diarrhea, blood in stool and melena. Genitourinary: Negative for dysuria, and hematuria. Musculoskeletal: Negative for myalgias, arthralgia. Skin: Negative for rash. Heme: Does not bleed or bruise easily. Neurological: Negative for speech change and focal weakness     Objective:     Visit Vitals    /54    Pulse (!) 48    Temp 98.9 °F (37.2 °C)    Resp 16    Wt 170 lb 3.1 oz (77.2 kg)    SpO2 96%    BMI 25.13 kg/m2      Physical Exam:   Constitutional: well-developed and well-nourished. No respiratory distress. Head: Normocephalic and atraumatic. Eyes: Pupils are equal, round  ENT: hearing normal  Neck: supple. No JVD present. Cardiovascular: slow rate, irregular rhythm. Exam reveals no gallop and no friction rub. No murmur heard. Pulmonary/Chest: Effort normal and breath sounds normal. No wheezes. Abdominal: Soft, no tenderness. Musculoskeletal: no edema. Neurological: alert, oriented. Skin: Skin is warm and dry  Psychiatric: normal mood and affect. Behavior is normal. Judgment and thought content normal.        Assessment/Plan:     Mr. Ryan Baptiste is s/p STEMI, successful PCI with RCA BARRIE. His LVEF is preserved 50%, but Q waves are present in inferior leads. He has frequent monomorphic PVCs on lidocaine gtt. With severe sinus bradycardia, amiodarone & beta blockers are not an option. As PVCs & VT are monomorphic, they are likely due to transmural inferior MI & scar substrate. Will discontinue lidocaine & monitor sinus rate & VT burden. If he is symptomatic & VT is persistently recurrent, would consider EP study & possible dual chamber ICD implant. If he has no further VT, but if symptomatic severe sinus bradycardia persists, would consider dual chamber pacemaker. PAGE [address] [facility] [place]  08/09/18      Addendum from EP attending:   I have seen, examined patient, and discussed with nurse practitioner, registered nurse, reviewed, updated note and agree with the assessment and plan    I have talked to him this am. He wants to get out of CCU and goes home  He denies symptoms of dizziness, syncope, dyspnea  Vital signs with slow rate  Exam shows irregular rhythm and no murmur  Legs no edema  Assessment and Plan: Will try him off lidocaine this am  He he has monomorphic VT, consider EP study possible dual chamber ICD if sustained VT and hemodynamically unstable VT    Thank you for involving me in this patient's care and please call with further concerns or questions. Robbie Michael M.D.   Electrophysiology/Cardiology  [facility] [place]  [address]  [address]  [phone]

## 2018-08-09 NOTE — PROGRESS NOTES
[facility] Progress Note    8/9/2018 8:25 AM  Admit Date: 8/7/2018  Admit Diagnosis: STEMI (ST elevation myocardial infarction) (HCC);STEMI invo*    Discussed plans for monitoring over the next 24h for hr, rhythm. Off lidocaine and doing ok. Still hoping to go home Friday. Assessment/Plan     1. STEMI/CAD - s/p PCI with 2.5x18mm medtronic [place] BARRIE to distal RCA  -continue ASA, Brilinta, statin, lisinopril, no beta blocker secondary to bradycardia  -post MI TTE showed LVEF 50%, mild MR  -consulted cardiac rehab  -family meeting today at 3pm to discuss his upcoming vacation plans  2. Sinus bradycardia - VR 40-50bpm, avoiding AV inna blockers, may need pacemaker per Dr. Mateus Rabago  3. NSVT - requiring lidocaine infusion on 8/8/18, lidocaine stopped this AM per Dr. Mateus Rabago, may need EP study per Dr. Mateus Rabago  -keep K 4-4.5 and Mg 2-2.5  4. Hypokalemia - K 3.8, will give KCL 20meq PO once now, recheck in AM    Echo 8/18 - LVEF 50%, no WMA, mild MR  Cardiac Cath 8/18 - L Main: no significant disease, LAD:no significant disease, LCx:no significant disease, RCA: 100% distal RCA stenosis, dye hang-up, distal vessel with left to right collaterals, LVEDP: 15mmhg  PCI: 2.10mm compliant balloon 12 ABE, 2.5x18mm Medtronic Moises BARRIE 14 ABE to distal RCA, 2.5x15mm NC balloon 16 ABE    Subjective:     Papa Mustafa denies chest pain, palpitations. He continues to have intermittent dyspnea at rest and would like to get out of the bed and walk around today - discussed with Laura Brand RN. Discussed cardiac cath findings, stenting, echo results and issues with bradycardia and NSVT currently. Objective:      Physical Exam:  Visit Vitals    /77    Pulse 62    Temp 99 °F (37.2 °C)    Resp 20    Wt 170 lb 3.1 oz (77.2 kg)    SpO2 93%    BMI 25.13 kg/m2     General Appearance:  Well developed, well nourished, alert and oriented x 3, in no acute distress.    Ears/Nose/Mouth/Throat:   Hearing grossly normal.         Neck: Supple. Chest:   Lungs clear to auscultation bilaterally. Cardiovascular:  Bradycardic, regular rhythm, S1, S2 normal, no murmur. Abdomen:   Soft, non-tender, bowel sounds are active. Extremities: No edema bilaterally. Skin: Warm and dry.            Telemetry: sinus bradycardia, PVCs    Data Review:   Labs:    Recent Results (from the past 24 hour(s))   MAGNESIUM    Collection Time: 08/08/18  3:38 PM   Result Value Ref Range    Magnesium 2.1 1.6 - 2.4 mg/dL   MAGNESIUM    Collection Time: 08/09/18  5:19 AM   Result Value Ref Range    Magnesium 2.0 1.6 - 2.4 mg/dL   METABOLIC PANEL, BASIC    Collection Time: 08/09/18  5:19 AM   Result Value Ref Range    Sodium 142 136 - 145 mmol/L    Potassium 3.8 3.5 - 5.1 mmol/L    Chloride 111 (H) 97 - 108 mmol/L    CO2 25 21 - 32 mmol/L    Anion gap 6 5 - 15 mmol/L    Glucose 94 65 - 100 mg/dL    BUN 17 6 - 20 MG/DL    Creatinine 0.88 0.70 - 1.30 MG/DL    BUN/Creatinine ratio 19 12 - 20      GFR est AA >60 >60 ml/min/1.73m2    GFR est non-AA >60 >60 ml/min/1.73m2    Calcium 9.4 8.5 - 10.1 MG/DL   LIPID PANEL    Collection Time: 08/09/18  5:19 AM   Result Value Ref Range    LIPID PROFILE          Cholesterol, total 123 <200 MG/DL    Triglyceride 123 <150 MG/DL    HDL Cholesterol 31 MG/DL    LDL, calculated 67.4 0 - 100 MG/DL    VLDL, calculated 24.6 MG/DL    CHOL/HDL Ratio 4.0 0 - 5.0             Current Facility-Administered Medications   Medication Dose Route Frequency    sodium chloride (NS) flush 5-10 mL  5-10 mL IntraVENous Q8H    sodium chloride (NS) flush 5-10 mL  5-10 mL IntraVENous PRN    sodium chloride (NS) flush 5-10 mL  5-10 mL IntraVENous Q8H    sodium chloride (NS) flush 5-10 mL  5-10 mL IntraVENous PRN    acetaminophen (TYLENOL) tablet 500 mg  500 mg Oral Q6H PRN    aspirin chewable tablet 81 mg  81 mg Oral DAILY    ticagrelor (BRILINTA) tablet 90 mg  90 mg Oral Q12H    atorvastatin (LIPITOR) tablet 80 mg  80 mg Oral QHS    lisinopril (PRINIVIL, ZESTRIL) tablet 5 mg  5 mg Oral DAILY       Brad Tee MD  [facility], [address] [address]  Ester Durán  [phone]

## 2018-08-09 NOTE — CARDIO/PULMONARY
Cardiac Rehab: MI education folder, with catheterization brochure, is at bedside of [facility]. Revisited to discuss plan of care. Dr Luis Ozuna consulted for NSVT. Educated using teach back method. Reviewed MI diagnosis definition and purpose of intervention. Discussed Heart Healthy/Low Sodium (2000 mg) diet. Reviewed the importance of medication compliance, the purpose of brinlinta, and potential side effects. Discussed follow up appointments with cardiologist, signs and symptoms of angina, and what to report to physician after discharge. Emphasized the value of cardiac exercise but there is not a Cardiac Rehab program available where he lives. [facility] verbalized understanding with questions answered.   Lindsay José RN

## 2018-08-09 NOTE — PROGRESS NOTES
Bedside shift change report given to [facility] (oncoming nurse) by Laura Lucio (offgoing nurse).  Report included the following information SBAR, Kardex, Procedure Summary, Intake/Output, MAR, Accordion, Recent Results, Med Rec Status and Cardiac Rhythm SB.

## 2018-08-09 NOTE — PROGRESS NOTES
Problem: Falls - Risk of  Goal: *Absence of Falls  Document Fabiola Fall Risk and appropriate interventions in the flowsheet. Outcome: Progressing Towards Goal  Fall Risk Interventions:            Medication Interventions: Assess postural VS orthostatic hypotension, Bed/chair exit alarm, Evaluate medications/consider consulting pharmacy, Patient to call before getting OOB, Teach patient to arise slowly, Utilize gait belt for transfers/ambulation                  Problem: Pressure Injury - Risk of  Goal: *Prevention of pressure injury  Document Flaquito Scale and appropriate interventions in the flowsheet. Outcome: Progressing Towards Goal  Pressure Injury Interventions: Activity Interventions: Assess need for specialty bed, Chair cushion, Increase time out of bed, Pressure redistribution bed/mattress(bed type), PT/OT evaluation    Mobility Interventions: Assess need for specialty bed, Chair cushion, Float heels, HOB 30 degrees or less, Pressure redistribution bed/mattress (bed type), PT/OT evaluation, Suspension boots, Trapeze to reposition, Turn and reposition approx.  every two hours(pillow and wedges)    Nutrition Interventions: Document food/fluid/supplement intake, Discuss nutritional consult with provider, Offer support with meals,snacks and hydration

## 2018-08-10 ENCOUNTER — HOME HEALTH ADMISSION (OUTPATIENT)
Dept: HOME HEALTH SERVICES | Facility: HOME HEALTH | Age: 78
End: 2018-08-10

## 2018-08-10 ENCOUNTER — TELEPHONE (OUTPATIENT)
Dept: CARDIOLOGY CLINIC | Age: 78
End: 2018-08-10

## 2018-08-10 VITALS
HEART RATE: 59 BPM | SYSTOLIC BLOOD PRESSURE: 137 MMHG | WEIGHT: 168.43 LBS | OXYGEN SATURATION: 99 % | BODY MASS INDEX: 24.87 KG/M2 | TEMPERATURE: 98.4 F | RESPIRATION RATE: 18 BRPM | DIASTOLIC BLOOD PRESSURE: 75 MMHG

## 2018-08-10 LAB
ANION GAP SERPL CALC-SCNC: 7 MMOL/L (ref 5–15)
BUN SERPL-MCNC: 19 MG/DL (ref 6–20)
BUN/CREAT SERPL: 22 (ref 12–20)
CALCIUM SERPL-MCNC: 9.5 MG/DL (ref 8.5–10.1)
CHLORIDE SERPL-SCNC: 110 MMOL/L (ref 97–108)
CO2 SERPL-SCNC: 25 MMOL/L (ref 21–32)
CREAT SERPL-MCNC: 0.85 MG/DL (ref 0.7–1.3)
GLUCOSE SERPL-MCNC: 94 MG/DL (ref 65–100)
MAGNESIUM SERPL-MCNC: 2.1 MG/DL (ref 1.6–2.4)
POTASSIUM SERPL-SCNC: 3.7 MMOL/L (ref 3.5–5.1)
SODIUM SERPL-SCNC: 142 MMOL/L (ref 136–145)
TROPONIN I SERPL-MCNC: 15.1 NG/ML

## 2018-08-10 PROCEDURE — 74011250637 HC RX REV CODE- 250/637: Performed by: STUDENT IN AN ORGANIZED HEALTH CARE EDUCATION/TRAINING PROGRAM

## 2018-08-10 PROCEDURE — 36415 COLL VENOUS BLD VENIPUNCTURE: CPT | Performed by: NURSE PRACTITIONER

## 2018-08-10 PROCEDURE — 84484 ASSAY OF TROPONIN QUANT: CPT | Performed by: NURSE PRACTITIONER

## 2018-08-10 PROCEDURE — 83735 ASSAY OF MAGNESIUM: CPT | Performed by: NURSE PRACTITIONER

## 2018-08-10 PROCEDURE — 80048 BASIC METABOLIC PNL TOTAL CA: CPT | Performed by: NURSE PRACTITIONER

## 2018-08-10 RX ORDER — NITROGLYCERIN 0.4 MG/1
0.4 TABLET SUBLINGUAL
Qty: 1 BOTTLE | Refills: 0 | Status: SHIPPED | OUTPATIENT
Start: 2018-08-10 | End: 2018-08-22 | Stop reason: ALTCHOICE

## 2018-08-10 RX ORDER — GUAIFENESIN 100 MG/5ML
81 LIQUID (ML) ORAL DAILY
Qty: 90 TAB | Refills: 3 | Status: SHIPPED | OUTPATIENT
Start: 2018-08-11 | End: 2019-08-08 | Stop reason: SDUPTHER

## 2018-08-10 RX ORDER — LISINOPRIL 5 MG/1
5 TABLET ORAL DAILY
Qty: 90 TAB | Refills: 0 | Status: SHIPPED | OUTPATIENT
Start: 2018-08-11 | End: 2018-10-26

## 2018-08-10 RX ORDER — ATORVASTATIN CALCIUM 40 MG/1
40 TABLET, FILM COATED ORAL
Status: DISCONTINUED | OUTPATIENT
Start: 2018-08-10 | End: 2018-08-10 | Stop reason: HOSPADM

## 2018-08-10 RX ORDER — ATORVASTATIN CALCIUM 40 MG/1
40 TABLET, FILM COATED ORAL
Qty: 90 TAB | Refills: 3 | Status: SHIPPED | OUTPATIENT
Start: 2018-08-10 | End: 2019-05-10 | Stop reason: SDUPTHER

## 2018-08-10 RX ORDER — NITROGLYCERIN 0.4 MG/1
0.4 TABLET SUBLINGUAL
Status: DISCONTINUED | OUTPATIENT
Start: 2018-08-10 | End: 2018-08-10 | Stop reason: HOSPADM

## 2018-08-10 RX ADMIN — ASPIRIN 81 MG CHEWABLE TABLET 81 MG: 81 TABLET CHEWABLE at 09:28

## 2018-08-10 RX ADMIN — Medication 10 ML: at 07:31

## 2018-08-10 RX ADMIN — LISINOPRIL 5 MG: 5 TABLET ORAL at 09:28

## 2018-08-10 RX ADMIN — TICAGRELOR 90 MG: 90 TABLET ORAL at 09:28

## 2018-08-10 NOTE — PROGRESS NOTES
Spiritual Care Partner Volunteer visited patient in room 456 on 8.10.18. Documented by: : Rev. Vinny Eckert.  Isaiah Rossi; [facility], to contact [phone] [address] call: [phone]

## 2018-08-10 NOTE — PROGRESS NOTES
Problem: Falls - Risk of  Goal: *Absence of Falls  Document Fabiola Fall Risk and appropriate interventions in the flowsheet. Outcome: Progressing Towards Goal  Fall Risk Interventions:            Medication Interventions: Teach patient to arise slowly                  Problem: Pressure Injury - Risk of  Goal: *Prevention of pressure injury  Document Flaquito Scale and appropriate interventions in the flowsheet. Outcome: Progressing Towards Goal  Pressure Injury Interventions:             Activity Interventions: Pressure redistribution bed/mattress(bed type)    Mobility Interventions: Pressure redistribution bed/mattress (bed type)    Nutrition Interventions: Document food/fluid/supplement intake

## 2018-08-10 NOTE — PROGRESS NOTES
Discussed in rounds regarding offer for [facility] MI - patient agreeable - referral made to [facility] via [facility] and to let them know of anticipated discharge to home today. JUANITO Edmondson      [facility] can accept for [facility] visit for Mon/Tues next week. Sixto Hansen with [facility] office aware of discharge today.  JUANITO Edmondson

## 2018-08-10 NOTE — PROGRESS NOTES
Hospital follow-up PCP transitional care appointment has been scheduled with Dr. Pratibha Tiwari for Wednesday, 8/15/18 at 10:30 a.m. Pending patient discharge.   Stacey Craig, Care Management Specialist.

## 2018-08-10 NOTE — PROGRESS NOTES
Cardiac Electrophysiology Hospital Progress Note     Subjective:      Luna Ramsey is a 68 y.o. patient who was initially seen yesterday for evaluation of PVCs, NSVT, & sinus bradycardia. He presented to [facility] ED with acute STEMI of inferior wall on 08/07/2018, transferred to [facility] for LHC, which showed RCA occlusion with left to right collateral flow. Successful PCI RCA via BARRIE. No other CAD noted. Post procedure, he remained bradycardic (40-50 bpm) with frequent monomorphic PVCs & monomorphic VT. Post PCI ECG showed sinus arrhythmia, inferior Q waves, & PVCs. Lidocaine gtt was initiated, with less VT noted, but he continued with frequent monomorphic PVCs. Last VT noted 08/08/2018. Lidocaine was discontinued yesterday. He ambulated yesterday without difficulty. PVCs less frequent, bradycardia improved today, rate 50s-70s at rest.  Last /74. He denies current chest pain or dizziness. He vocalizes that he is hoping for discharge today. Family should be in later today. Echo (08/08/2018): LVEF 50%, no regional wall motion abnormalities. Mild MR.       Problem List  Date Reviewed: 3/14/2014          Codes Class Noted    STEMI (ST elevation myocardial infarction) ([facility] [address] ICD-10-CM: I21.3  ICD-9-CM: 410.90  8/8/2018        STEMI involving right coronary artery [facility]) ICD-10-CM: I21.11  ICD-9-CM: 410.31  8/8/2018        Sciatica of left side ICD-10-CM: M54.32  ICD-9-CM: 724.3  3/14/2014        Hypertension ICD-10-CM: I10  ICD-9-CM: 401.9  3/14/2014              Current Facility-Administered Medications   Medication Dose Route Frequency Provider Last Rate Last Dose    sodium chloride (NS) flush 5-10 mL  5-10 mL IntraVENous Q8H Kavon Blanks, DO   10 mL at 08/10/18 0731    sodium chloride (NS) flush 5-10 mL  5-10 mL IntraVENous PRN Kavon Blanks, DO        sodium chloride (NS) flush 5-10 mL  5-10 mL IntraVENous Q8H Kavon Blanks, DO   10 mL at 08/10/18 0714    sodium chloride (NS) flush 5-10 mL  5-10 mL IntraVENous PRN Wolf Sin, DO        acetaminophen (TYLENOL) tablet 500 mg  500 mg Oral Q6H PRN Wolf Sin, DO        aspirin chewable tablet 81 mg  81 mg Oral DAILY Wolf Sin, DO   81 mg at 08/09/18 8837    ticagrelor (BRILINTA) tablet 90 mg  90 mg Oral Q12H Wolf Sin, DO   90 mg at 08/09/18 2213    atorvastatin (LIPITOR) tablet 80 mg  80 mg Oral QHS Wolf Sin, DO   80 mg at 08/09/18 2213    lisinopril (PRINIVIL, ZESTRIL) tablet 5 mg  5 mg Oral DAILY Wolf Sin, DO   5 mg at 08/09/18 1030     No Known Allergies  Past Medical History:   Diagnosis Date    Hypercholesterolemia     Hypertension     Sciatica        Social History   Substance Use Topics    Smoking status: Former Smoker    Smokeless tobacco: Never Used    Alcohol use No        Review of Systems:   Constitutional: Negative for fever, chills, weight loss, malaise/fatigue. HEENT: Negative for nosebleeds, vision changes. Respiratory: Negative for cough, hemoptysis  Cardiovascular: Negative for current chest pain, palpitations, orthopnea, claudication, leg swelling, syncope, and PND. Gastrointestinal: Negative for nausea, vomiting, diarrhea, blood in stool and melena. Genitourinary: Negative for dysuria, and hematuria. Musculoskeletal: Negative for myalgias, arthralgia. Skin: Negative for rash. Heme: Does not bleed or bruise easily. Neurological: Negative for speech change and focal weakness     Objective:     Visit Vitals    /74 (BP 1 Location: Left arm, BP Patient Position: At rest)    Pulse (!) 59    Temp 98.6 °F (37 °C)    Resp 18    Wt 168 lb 6.9 oz (76.4 kg)    SpO2 97%    BMI 24.87 kg/m2      Physical Exam:   Constitutional: well-developed and well-nourished. No respiratory distress. Head: Normocephalic and atraumatic. Eyes: Pupils are equal, round  ENT: hearing normal  Neck: supple. No JVD present. Cardiovascular: slow rate, regular rhythm.  Exam reveals no gallop and no friction rub. No murmur heard. Pulmonary/Chest: Effort normal and breath sounds normal. No wheezes. Abdominal: Soft, no tenderness. Musculoskeletal: no edema. Neurological: alert, oriented. Skin: Skin is warm and dry  Psychiatric: normal mood and affect. Behavior is normal. Judgment and thought content normal.        Assessment/Plan:     Mr. Kaiser Donovan is s/p STEMI, successful PCI with RCA BARRIE. His LVEF is preserved 50%, but Q waves are present in inferior leads. Monomorphic VT has been quiescent since 08/08/2018, PVCs have decreased, and lidocaine was stopped yesterday. As PVCs & VT were monomorphic, they are likely due to transmural inferior MI & scar substrate. cannot beta blocker upon discharge due to sinus bradycardia. [facility] [place]  08/10/18    Addendum from EP attending:   I have seen, examined patient, and discussed with nurse practitioner, registered nurse, reviewed, updated note and agree with the assessment and plan    I have talked to him and he is feeling well, has walked laps in the units  No dizziness or WASHBURN  Vital signs are stable except for resting sinus bradycardia but not severe and there is no more VT  Exam shows regular rhythm and no murmur     Assessment and Plan:  Continue to monitor rate in office  LVEF is too high for Life vest defib  Sinus rate higher so no pacemaker at this time  No more VT so no ICD indicated    Thank you for involving me in this patient's care and please call with further concerns or questions. Chris Tripp M.D.   Electrophysiology/Cardiology  [facility] [place]  [address] [place] [address]  [phone]

## 2018-08-10 NOTE — PROGRESS NOTES
1930: Bedside and Verbal shift change report given to Raegan Thomas RN (oncoming nurse) by Keira Lee RN (offgoing nurse). Report included the following information SBAR, Kardex, MAR and Recent Results.

## 2018-08-10 NOTE — DISCHARGE SUMMARY
Cardiology Discharge Summary     Patient ID:  Kenny Saravia  [id number]  68 y.o.  1940    Admit Date: 8/7/2018    Discharge Date: 8/10/2018     Admitting Physician: Oracio Garay DO     Discharge Physician: Cruz Valentino MD    Admission Diagnoses: STEMI (ST elevation myocardial infarction) ([facility]);STEMI invo*    Discharge Diagnoses: Active Problems:    STEMI (ST elevation myocardial infarction) ([facility] [address] (8/8/2018)      STEMI involving right coronary artery [facility]) (8/8/2018)        Discharge Condition: Good    Cardiology Procedures this Admission:  Left heart catheterization with PCI    Hospital Course: Admitted with inferior STEMI and dyspnea/feeling poorly with chest discomfort. Came in via helicopter form [facility]. Has 100% distal RCA. Rx with Moises BARRIE. Pt pain free after procedure but with profound bradycardia and VT. Required lidocaine drip. Preserved EF. On 3rd day bradycardia and VT resovlved. Pt discharged home in stable condition. L Main: no significant disease  LAD:no significant disease  LCx:no significant disease  RCA: 100% distal RCA stenosis, dye hang-up, distal vessel with left to right collaterals     LVEDP:  15mmhg     PCI:  Heparin  BMW  2.10mm compliant balloon 12 ABE  2.5x18mm Medtronic Moises BARRIE 14 ABE  2.5x15mm NC balloon 16 ABE    Consults: Cardiology    Discharge Exam:     Visit Vitals    /75 (BP 1 Location: Left arm, BP Patient Position: At rest)    Pulse (!) 59    Temp 98.4 °F (36.9 °C)    Resp 18    Wt 168 lb 6.9 oz (76.4 kg)    SpO2 99%    BMI 24.87 kg/m2     General Appearance:  Well developed, well nourished,alert and oriented x 3, and individual in no acute distress. Ears/Nose/Mouth/Throat:   Hearing grossly normal.         Neck: Supple. Chest:   Lungs clear to auscultation bilaterally. Cardiovascular:  Regular rate and rhythm, S1, S2 normal, no murmur. Abdomen:   Soft, non-tender, bowel sounds are active. Extremities: No edema bilaterally.     Skin: Warm and dry. Disposition: home    Patient Instructions:   Current Discharge Medication List      CONTINUE these medications which have NOT CHANGED    Details   lovastatin (MEVACOR) 20 mg tablet Take 20 mg by mouth daily. dilTIAZem (CARDIZEM) 60 mg tablet Take 60 mg by mouth two (2) times a day. TELMISARTAN (MICARDIS PO) Take 80 mg by mouth daily. Referenced discharge instructions provided by nursing for diet and activity. Follow-up with Dr. Yefri Asif as scheduled. .  No future appointments.        Signed:  Kierra Álvarez MD  8/10/2018  12:06 PM

## 2018-08-10 NOTE — DISCHARGE INSTRUCTIONS
Do not return to work for 1 week. 10 lb weight restriction for 1 week then 50 lb weight lifting restriction  You must NOT stop Brillinta without consent from cardiologist.                 Admission Diagnoses:   STEMI (ST elevation myocardial infarction) [facility])  STEMI involving right coronary artery [facility])    Discharge Diagnoses: Active Problems:    STEMI (ST elevation myocardial infarction) ([facility] [address] (8/8/2018)      [facility] (8/8/2018)      CARDIAC CATHETERIZATION/PCI DISCHARGE INSTRUCTIONS    It is normal to feel tired the first couple days. Take it easy and follow the physicians instructions. CHECK THE CATHETER INSERTION SITE DAILY:    You may shower 24 hours after the procedure, remove the bandage during showering. Wash with soap and water and pat dry. Gentle cleaning of the site with soap and water is sufficient, cover with a dry clean dressing or bandage. Do not apply creams or powders to the area. Do not sit in a bathtub or pool of water for 7 days or until wound has completely healed. Temporary bruising and discomfort is normal and may last a few weeks. You may have a  formation of a small lump at the site which may last up to 6 weeks. CALL THE PHYSICIANS:    If the site becomes red, swollen or feels warm to the touch  If there is bleeding or drainage or if there is unusual pain at the groin or down the leg. If there is any bleeding, lie down, apply pressure or have someone apply pressure with a clean cloth until the bleeding stops. If the bleeding continues, call 911 to be transported to the hospital.  DO NOT DRIVE YOURSELF, [facility] ACTIVITY:    For the first 24-48 hours or as instructed by the physician:  No lifting, pushing or pulling over 10 pounds and no straining the insertion site. Do not life grocery bags or the garbage can, do not run the vacuum  or  for 7 days.   Start with short walks as in the hospital and gradually increase as tolerated each day. It is recommended to walk 30 minutes 5-7 days per week. Follow your physicians instructions on activity. Avoid walking outside in extremes of heat or cold. Walk inside when it is cold and windy or hot and humid. Things to keep in mind:  No driving for at least 24 hours, or as designated by your physician. Limit the number of times you go up and down the stairs  Take rests and pace yourself with activity. Be careful and do not strain with bowel movements. MEDICATIONS:    Take all medications as prescribed  Call your physician if you have any questions  Keep an updated list of your medications with you at all times and give a list to your physician and pharmacist        SIGNS AND SYMPTOMS:    Be cautious of symptoms of angina or recurrent symptoms such as chest discomfort, unusual shortness of breath or fatigue. These could be symptoms of restenosis, a new blockage or a heart attack. If your symptoms are relieved with rest it is still recommended that you notify your physician of recurrent chest pain or discomfort. FOR CHEST PAIN or symptoms of angina not relieved with rest:  If the discomfort is not relieved with rest, and you have been prescribed Nitroglycerin, take as directed (taken under the tongue, one at a time 5 minutes apart for a total of 3 doses). If the discomfort is not relieved after the 3rd nitroglycerin, call 911. If you have not been prescribed Nitroglycerin  and your chest discomfort is not relieved with rest, call 911. AFTER CARE:    Follow up with your physician as instructed. Follow a heart healthy diet with proper portion control, daily stress management, daily exercise, blood pressure and cholesterol control , and smoking cessation.

## 2018-08-10 NOTE — TELEPHONE ENCOUNTER
Nela Malagon called to see about getting a hospital follow up appointment for the following patient.     Thanks  Adis Dominguez

## 2018-08-10 NOTE — PROGRESS NOTES
[facility] Progress Note    8/10/2018 8:25 AM  Admit Date: 8/7/2018  Admit Diagnosis: STEMI (ST elevation myocardial infarction) (HCC);STEMI invo*    Discussed plans for monitoring over the next 24h for hr, rhythm. Off lidocaine and doing ok. Home today  JEFFREY Burton   Assessment/Plan     1. STEMI/CAD - s/p PCI with 2.5x18mm medtronic Munden BARRIE to distal RCA  -continue ASA, Brilinta, statin, lisinopril, no beta blocker secondary to bradycardia  -post MI TTE showed LVEF 50%, mild MR  -consulted cardiac rehab  2. Sinus bradycardia - VR 40-50bpm, avoiding AV inna blockers, may need pacemaker per Dr. Angelique Moreno  3. NSVT - requiring lidocaine infusion on 8/8/18, lidocaine stopped this AM per Dr. Angelique Moreno, may need EP study per Dr. Angelique Moreno  -keep K 4-4.5 and Mg 2-2.5  4. Hypokalemia - K 3.8, will give KCL 20meq PO once now, recheck in AM    Echo 8/18 - LVEF 50%, no WMA, mild MR  Cardiac Cath 8/18 - L Main: no significant disease, LAD:no significant disease, LCx:no significant disease, RCA: 100% distal RCA stenosis, dye hang-up, distal vessel with left to right collaterals, LVEDP: 15mmhg  PCI: 2.10mm compliant balloon 12 ABE, 2.5x18mm Medtronic Munden BARRIE 14 ABE to distal RCA, 2.5x15mm NC balloon 16 ABE    Subjective:     Papa Morris denies chest pain, palpitations. Has kurt up and walking, felt great walking around the unit today. No chest pain, no dyspnea, HR up to 60s, no VT/NSVT. Anxious to go home. Family is attentive. He will be close to EMS and ER care if needed after transition and on his planned vacation. Objective:      Physical Exam:  Visit Vitals    /75 (BP 1 Location: Left arm, BP Patient Position: At rest)    Pulse (!) 59    Temp 98.4 °F (36.9 °C)    Resp 18    Wt 168 lb 6.9 oz (76.4 kg)    SpO2 99%    BMI 24.87 kg/m2     General Appearance:  Well developed, well nourished, alert and oriented x 3, in no acute distress.    Ears/Nose/Mouth/Throat:   Hearing grossly normal.         Neck: Supple. Chest:   Lungs clear to auscultation bilaterally. Cardiovascular:  Bradycardic, regular rhythm, S1, S2 normal, no murmur. Abdomen:   Soft, non-tender, bowel sounds are active. Extremities: No edema bilaterally. Skin: Warm and dry.            Telemetry: sinus bradycardia, PVCs    Data Review:   Labs:    Recent Results (from the past 24 hour(s))   MAGNESIUM    Collection Time: 08/10/18  3:02 AM   Result Value Ref Range    Magnesium 2.1 1.6 - 2.4 mg/dL   METABOLIC PANEL, BASIC    Collection Time: 08/10/18  3:02 AM   Result Value Ref Range    Sodium 142 136 - 145 mmol/L    Potassium 3.7 3.5 - 5.1 mmol/L    Chloride 110 (H) 97 - 108 mmol/L    CO2 25 21 - 32 mmol/L    Anion gap 7 5 - 15 mmol/L    Glucose 94 65 - 100 mg/dL    BUN 19 6 - 20 MG/DL    Creatinine 0.85 0.70 - 1.30 MG/DL    BUN/Creatinine ratio 22 (H) 12 - 20      GFR est AA >60 >60 ml/min/1.73m2    GFR est non-AA >60 >60 ml/min/1.73m2    Calcium 9.5 8.5 - 10.1 MG/DL   TROPONIN I    Collection Time: 08/10/18  3:02 AM   Result Value Ref Range    Troponin-I, Qt. 15.10 (H) <0.05 ng/mL           Current Facility-Administered Medications   Medication Dose Route Frequency    atorvastatin (LIPITOR) tablet 40 mg  40 mg Oral QHS    sodium chloride (NS) flush 5-10 mL  5-10 mL IntraVENous Q8H    sodium chloride (NS) flush 5-10 mL  5-10 mL IntraVENous PRN    sodium chloride (NS) flush 5-10 mL  5-10 mL IntraVENous Q8H    sodium chloride (NS) flush 5-10 mL  5-10 mL IntraVENous PRN    acetaminophen (TYLENOL) tablet 500 mg  500 mg Oral Q6H PRN    aspirin chewable tablet 81 mg  81 mg Oral DAILY    ticagrelor (BRILINTA) tablet 90 mg  90 mg Oral Q12H    lisinopril (PRINIVIL, ZESTRIL) tablet 5 mg  5 mg Oral DAILY       Digna Thompson MD  [facility] [address] [place] [address] 13, [address]  Ester Durán  [phone]

## 2018-08-11 ENCOUNTER — HOME CARE VISIT (OUTPATIENT)
Dept: HOME HEALTH SERVICES | Facility: HOME HEALTH | Age: 78
End: 2018-08-11

## 2018-08-11 RX ORDER — NITROGLYCERIN 0.4 MG/1
0.4 TABLET SUBLINGUAL
Qty: 1 BOTTLE | Refills: 11 | Status: SHIPPED | OUTPATIENT
Start: 2018-08-11 | End: 2019-06-26 | Stop reason: SDUPTHER

## 2018-08-11 RX ORDER — NITROGLYCERIN 0.4 MG/1
0.4 TABLET SUBLINGUAL
Qty: 1 BOTTLE | Refills: 11 | Status: SHIPPED | OUTPATIENT
Start: 2018-08-11 | End: 2018-08-11 | Stop reason: SDUPTHER

## 2018-08-11 RX ORDER — NITROGLYCERIN 0.4 MG/1
0.4 TABLET SUBLINGUAL
Qty: 1 BOTTLE | Refills: 11 | Status: SHIPPED | OUTPATIENT
Start: 2018-08-11 | End: 2018-08-22 | Stop reason: ALTCHOICE

## 2018-08-13 ENCOUNTER — TELEPHONE (OUTPATIENT)
Dept: CARDIAC REHAB | Age: 78
End: 2018-08-13

## 2018-08-13 ENCOUNTER — PATIENT OUTREACH (OUTPATIENT)
Dept: CARDIOLOGY CLINIC | Age: 78
End: 2018-08-13

## 2018-08-13 LAB
ACT BLD: 125 SECS (ref 79–138)
ACT BLD: 445 SECS (ref 79–138)

## 2018-08-13 NOTE — TELEPHONE ENCOUNTER
8/13/2018 Cardiac Rehab: Called Mr. Leeann Lechuga to discuss participation in the Cardiac Rehab Program following a STEMI and stent on 8/8/18. Patient reports he is doing well and has no pain. He declined enrollment in Cardiac Rehab.  Ceasar Amezcua RN

## 2018-08-13 NOTE — PROGRESS NOTES
Hospital Discharge Follow-Up      Date/Time:  2018 1:19 PM    Patient was admitted to [facility] on  and discharged on 8/10 for STEMI. The physician discharge summary was available at the time of outreach. Patient was contacted within 1 business days of discharge. Top Challenges reviewed with the provider   Reinforce importance of cardiac rehab-see if patient would like to re-enroll         Method of communication with provider :staff message    Inpatient RRAT score: 15  Was this a readmission? no     Nurse Navigator (NN) contacted the patient by telephone to perform post hospital discharge assessment. Verified name and  with patient as identifiers. Provided introduction to self, and explanation of the Nurse Navigator role. Reviewed discharge instructions and red flags with patient who verbalized understanding. Patient given an opportunity to ask questions and does not have any further questions or concerns at this time. The patient agrees to contact the PCP office for questions related to their healthcare. NN provided contact information for future reference. Disease Specific:   N/A    Summary of patient's top problems:  1. STEM- confirmed patient did receive his Brilinta. Educated patient on the importance of taking medication and not stopping unless instructed to do so by   2. Lack of knowledge regarding disease- patient left to go out of town with family for the week- patient cancelled his PCP apt for the week and also ignored [facility] calls despite accepting them per CM note. Patient stated this is his family trip and he wasn't going to miss it. Also patient was contacted by cardiac rehab and refused to enroll- I educated patient more on the benefits of cardiac rehab but patient continued to refuse.      Home Health orders at discharge: [address] [facility]: VINAYAK [facility]  Date of initial visit: visit never occurred- patient left to go out of town on family vacation for a week- [facility] visit will not take place. Durable Medical Equipment ordered/company: n/a    Barriers to care? Lack of knowledge about disease    Advance Care Planning:   Does patient have an Advance Directive:  not on file; education provided     Medication(s):   New Medications at Discharge: aspirin, lipitor, lisinopril, brilinta   Changed Medications at Discharge: none  Discontinued Medications at Discharge: cardizem, lovastatin, micardis    Medication reconciliation was performed with patient, who verbalizes understanding of administration of home medications. There were no barriers to obtaining medications identified at this time. Referral to Pharm D needed: no     Current Outpatient Prescriptions   Medication Sig    nitroglycerin (NITROSTAT) 0.4 mg SL tablet 1 Tab by SubLINGual route every five (5) minutes as needed for Chest Pain.  nitroglycerin (NITROSTAT) 0.4 mg SL tablet 1 Tab by SubLINGual route every five (5) minutes as needed for Chest Pain.  aspirin 81 mg chewable tablet Take 1 Tab by mouth daily.  atorvastatin (LIPITOR) 40 mg tablet Take 1 Tab by mouth nightly.  lisinopril (PRINIVIL, ZESTRIL) 5 mg tablet Take 1 Tab by mouth daily.  ticagrelor (BRILINTA) 90 mg tablet Take 1 Tab by mouth every twelve (12) hours every twelve (12) hours.  nitroglycerin (NITROSTAT) 0.4 mg SL tablet 1 Tab by SubLINGual route every five (5) minutes as needed for Chest Pain (if no relief of chest pain after 3rd nitro- call 911 or go to ER). Up to 3 doses. No current facility-administered medications for this visit. There are no discontinued medications.     [facility] follow up appointment(s): Future Appointments  Date Time Provider Florentin Brothers   8/22/2018 9:20 AM Rachana Bar MD [address]      Non-[facility] follow up appointment(s): PCP- Dr. Mckenzie Cassidy 8/22 after he sees Dr. Kim Jacinto:  out of service area     Goals     None

## 2018-08-22 ENCOUNTER — OFFICE VISIT (OUTPATIENT)
Dept: CARDIOLOGY CLINIC | Age: 78
End: 2018-08-22

## 2018-08-22 VITALS
OXYGEN SATURATION: 97 % | RESPIRATION RATE: 16 BRPM | HEIGHT: 69 IN | SYSTOLIC BLOOD PRESSURE: 142 MMHG | BODY MASS INDEX: 24.29 KG/M2 | DIASTOLIC BLOOD PRESSURE: 76 MMHG | HEART RATE: 108 BPM | WEIGHT: 164 LBS

## 2018-08-22 DIAGNOSIS — I25.10 CORONARY ARTERY DISEASE INVOLVING NATIVE CORONARY ARTERY OF NATIVE HEART WITHOUT ANGINA PECTORIS: Primary | ICD-10-CM

## 2018-08-22 DIAGNOSIS — I10 HYPERTENSION, UNSPECIFIED TYPE: ICD-10-CM

## 2018-08-22 DIAGNOSIS — R00.1 BRADYCARDIA: ICD-10-CM

## 2018-08-22 DIAGNOSIS — I21.11 STEMI INVOLVING RIGHT CORONARY ARTERY (HCC): ICD-10-CM

## 2018-08-22 PROBLEM — I21.3 STEMI (ST ELEVATION MYOCARDIAL INFARCTION) (HCC): Status: RESOLVED | Noted: 2018-08-08 | Resolved: 2018-08-22

## 2018-08-22 RX ORDER — METOPROLOL SUCCINATE 25 MG/1
12.5 TABLET, EXTENDED RELEASE ORAL DAILY
Qty: 30 TAB | Refills: 5 | Status: SHIPPED | OUTPATIENT
Start: 2018-08-22 | End: 2020-01-13 | Stop reason: ALTCHOICE

## 2018-08-22 NOTE — PROGRESS NOTES
Verified patient with two patient identifiers. Medications reviewed/approved by Dr. Urvashi Pinto. Verbal from Dr. Urvashi Pinto to remove the medications that were deleted during the visit. Medication(s) removed: ntg x2    Chief Complaint   Patient presents with    Coronary Artery Disease     New patient evaluation - follow up post [facility] discharge. 1. Have you been to the ER, urgent care clinic since your last visit? Hospitalized since your last visit? New pt    2. Have you seen or consulted any other health care providers outside of the [facility] since your last visit? Include any pap smears or colon screening.  New pt

## 2018-08-22 NOTE — MR AVS SNAPSHOT
[address] [place] [address] 
 
 
 [address] [facility] [address] [address] [phone] Patient: Sona Wu MRN: [id number] Visit Information Date & Time Provider Department Dept. Phone Encounter #  
 8/22/2018  9:20 AM Lnynette Zaragoza, [address] [place] [address] [facility] [phone] [id number] Follow-up Instructions Return in about 3 months (around 11/22/2018). Follow-up and Disposition History Upcoming Health Maintenance Date Due DTaP/Tdap/Td series (1 - Tdap) 12/26/1961 ZOSTER VACCINE AGE 60> 10/26/2000 GLAUCOMA SCREENING Q2Y 12/26/2005 Pneumococcal 65+ Low/Medium Risk (1 of 2 - PCV13) 12/26/2005 MEDICARE YEARLY EXAM 3/20/2018 Influenza Age 5 to Adult 8/1/2018 Allergies as of 8/22/2018  Review Complete On: 8/22/2018 By: Lynnette Zaragoza MD  
 No Known Allergies Current Immunizations  Never Reviewed No immunizations on file. Not reviewed this visit You Were Diagnosed With   
  
 Codes Comments Coronary artery disease involving native coronary artery of native heart without angina pectoris    -  Primary ICD-10-CM: I25.10 ICD-9-CM: 414.01   
 STEMI involving right coronary artery ([facility] [address]     ICD-10-CM: I21.11 ICD-9-CM: 410.31 Hypertension, unspecified type     ICD-10-CM: I10 
ICD-9-CM: 401.9 Bradycardia     ICD-10-CM: R00.1 ICD-9-CM: 427.89 Vitals BP Pulse Resp Height(growth percentile) Weight(growth percentile) SpO2  
 142/76 (BP 1 Location: Left arm, BP Patient Position: Sitting) (!) 108 16 5' 9\" (1.753 m) 164 lb (74.4 kg) 97% BMI Smoking Status 24.22 kg/m2 Former Smoker Vitals History BMI and BSA Data Body Mass Index Body Surface Area  
 24.22 kg/m 2 1.9 m 2 Preferred Pharmacy Pharmacy Name Phone [facility] [phone] Your Updated Medication List  
  
   
 This list is accurate as of 18 10:04 AM.  Always use your most recent med list.  
  
  
  
  
 aspirin 81 mg chewable tablet Take 1 Tab by mouth daily. atorvastatin 40 mg tablet Commonly known as:  LIPITOR Take 1 Tab by mouth nightly. lisinopril 5 mg tablet Commonly known as:  Jhoana [place] Take 1 Tab by mouth daily. metoprolol succinate 25 mg XL tablet Commonly known as:  TOPROL-XL Take 0.5 Tabs by mouth daily. nitroglycerin 0.4 mg SL tablet Commonly known as:  NITROSTAT  
1 Tab by SubLINGual route every five (5) minutes as needed for Chest Pain. ticagrelor 90 mg tablet Commonly known as:  [employer] Take 1 Tab by mouth every twelve (12) hours every twelve (12) hours. Prescriptions Sent to Pharmacy Refills  
 nitroglycerin (NITROSTAT) 0.4 mg SL tablet 11 Si Tab by SubLINGual route every five (5) minutes as needed for Chest Pain. Class: Normal  
 Pharmacy: [place] [id number] Ph #: [phone] Route: SubLINGual  
 metoprolol succinate (TOPROL-XL) 25 mg XL tablet 5 Sig: Take 0.5 Tabs by mouth daily. Class: Normal  
 Pharmacy: [place] [id number] Ph #: [phone] Route: Oral  
  
We Performed the Following AMB POC EKG ROUTINE W/ 12 LEADS, INTER & REP [11663 CPT(R)] Follow-up Instructions Return in about 3 months (around 2018). Introducing [facility]! Jolanta Boykin introduces [employer] patient portal. Now you can access parts of your medical record, email your doctor's office, and request medication refills online. 1. In your internet browser, go to [email or web address]://[employer] [facility]/[employer] 2. Click on the First Time User? Click Here link in the Sign In box. You will see the New Member Sign Up page. 3. Enter your [employer] Access Code exactly as it appears below.  You will not need to use this code after youve completed the sign-up process. If you do not sign up before the expiration date, you must request a new code. · [employer] Access Code: [email or web address] Expires: 11/5/2018 10:13 PM 
 
4. Enter the last four digits of your Social Security Number (xxxx) and Date of Birth (mm/dd/yyyy) as indicated and click Submit. You will be taken to the next sign-up page. 5. Create a [employer] ID. This will be your [employer] login ID and cannot be changed, so think of one that is secure and easy to remember. 6. Create a [employer] password. You can change your password at any time. 7. Enter your Password Reset Question and Answer. This can be used at a later time if you forget your password. 8. Enter your e-mail address. You will receive e-mail notification when new information is available in [address] 9. Click Sign Up. You can now view and download portions of your medical record. 10. Click the Download Summary menu link to download a portable copy of your medical information. If you have questions, please visit the Frequently Asked Questions section of the [employer] website. Remember, [employer] is NOT to be used for urgent needs. For medical emergencies, dial 911. Now available from your iPhone and Android! Please provide this summary of care documentation to your next provider. Your primary care clinician is listed as Jaylon Gay. If you have any questions after today's visit, please call [phone].

## 2018-08-22 NOTE — PROGRESS NOTES
Lucero Peguero is a 68 y.o. male is here for hospitla f/u--s/p STEMI of RCA 8/7/18--transferred to [facility], primary PCI/BARRIE to RCA. Had transient VT and chris issues early on, doing well post hosp d/c. On ASA, Brilinta, statin, lisinopril, prn NTG (no beta blocker currently due to chris). HR actually now running high, some PVC's. No CV sx or complaints. .  The patient denies chest pain/ shortness of breath, orthopnea, PND, LE edema, palpitations, syncope, presyncope or fatigue. Patient Active Problem List    Diagnosis Date Noted    Coronary artery disease involving native coronary artery of native heart without angina pectoris 08/22/2018    STEMI involving right coronary artery ([facility] [address] 08/08/2018    Sciatica of left side 03/14/2014    Hypertension 03/14/2014      Adalgisa Rico NP  Past Medical History:   Diagnosis Date    CAD (coronary artery disease)     Hypercholesterolemia     Hypertension     Sciatica     STEMI (ST elevation myocardial infarction) ([facility] [address] 08/07/2018    RCA      Past Surgical History:   Procedure Laterality Date    HX PTCA  08/07/2018    PTCA/BARRIE RCA (STEMI)     No Known Allergies   No family history on file. Social History     Social History    Marital status:      Spouse name: N/A    Number of children: N/A    Years of education: N/A     Occupational History    Not on file. Social History Main Topics    Smoking status: Former Smoker     Quit date: 8/22/1992    Smokeless tobacco: Never Used    Alcohol use No    Drug use: Not on file    Sexual activity: Not on file     Other Topics Concern    Not on file     Social History Narrative      Current Outpatient Prescriptions   Medication Sig    metoprolol succinate (TOPROL-XL) 25 mg XL tablet Take 0.5 Tabs by mouth daily.  aspirin 81 mg chewable tablet Take 1 Tab by mouth daily.  atorvastatin (LIPITOR) 40 mg tablet Take 1 Tab by mouth nightly.     lisinopril (PRINIVIL, ZESTRIL) 5 mg tablet Take 1 Tab by mouth daily.  ticagrelor (BRILINTA) 90 mg tablet Take 1 Tab by mouth every twelve (12) hours every twelve (12) hours.  nitroglycerin (NITROSTAT) 0.4 mg SL tablet 1 Tab by SubLINGual route every five (5) minutes as needed for Chest Pain. No current facility-administered medications for this visit. Review of Symptoms:    CONST  No weight change. No fever, chills, sweats    ENT No visual changes, URI sx, sore throat    CV  See HPI   RESP  No cough, or sputum, wheezing. Also see HPI   GI  No abdominal pain or change in bowel habits. No heartburn or dysphagia. No melena or rectal bleeding.   No dysuria, urgency, frequency, hematuria   MSKEL  No joint pain, swelling. No muscle pain. SKIN  No rash or lesions. NEURO  No headache, syncope, or seizure. No weakness, loss of sensation, or paresthesias. PSYCH  No low mood or depression  No anxiety. HE/LYMPH  No easy bruising, abnormal bleeding, or enlarged glands.         Physical ExamPhysical Exam:    Visit Vitals    /76 (BP 1 Location: Left arm, BP Patient Position: Sitting)    Pulse (!) 108    Resp 16    Ht 5' 9\" (1.753 m)    Wt 164 lb (74.4 kg)    SpO2 97%    BMI 24.22 kg/m2     Gen: NAD  HEENT:  PERRL, throat clear  Neck: no adenopathy, no thyromegaly, no JVD   Heart:  Regular,Nl S1S2,  no murmur, gallop or rub.   Lungs:  clear  Abdomen:   Soft, non-tender, bowel sounds are active.   Extremities:  No edema  Pulse: symmetric  Neuro: A&O times 3, No focal neuro deficits    Cardiographics    ECG: sinus tachy, recent IMI, PVC\"s    Labs:   Lab Results   Component Value Date/Time    Sodium 142 08/10/2018 03:02 AM    Sodium 142 08/09/2018 05:19 AM    Sodium 141 08/08/2018 02:01 AM    Sodium 139 08/07/2018 10:30 PM    Potassium 3.7 08/10/2018 03:02 AM    Potassium 3.8 08/09/2018 05:19 AM    Potassium 4.0 08/08/2018 02:01 AM    Potassium 3.5 08/07/2018 10:30 PM    Chloride 110 (H) 08/10/2018 03:02 AM    Chloride 111 (H) 08/09/2018 05:19 AM    Chloride 110 (H) 08/08/2018 02:01 AM    Chloride 103 08/07/2018 10:30 PM    CO2 25 08/10/2018 03:02 AM    CO2 25 08/09/2018 05:19 AM    CO2 23 08/08/2018 02:01 AM    CO2 24 08/07/2018 10:30 PM    Anion gap 7 08/10/2018 03:02 AM    Anion gap 6 08/09/2018 05:19 AM    Anion gap 8 08/08/2018 02:01 AM    Anion gap 12 08/07/2018 10:30 PM    Glucose 94 08/10/2018 03:02 AM    Glucose 94 08/09/2018 05:19 AM    Glucose 133 (H) 08/08/2018 02:01 AM    Glucose 206 (H) 08/07/2018 10:30 PM    BUN 19 08/10/2018 03:02 AM    BUN 17 08/09/2018 05:19 AM    BUN 16 08/08/2018 02:01 AM    BUN 16 08/07/2018 10:30 PM    Creatinine 0.85 08/10/2018 03:02 AM    Creatinine 0.88 08/09/2018 05:19 AM    Creatinine 0.81 08/08/2018 02:01 AM    Creatinine 1.08 08/07/2018 10:30 PM    BUN/Creatinine ratio 22 (H) 08/10/2018 03:02 AM    BUN/Creatinine ratio 19 08/09/2018 05:19 AM    BUN/Creatinine ratio 20 08/08/2018 02:01 AM    BUN/Creatinine ratio 15 08/07/2018 10:30 PM    GFR est AA >60 08/10/2018 03:02 AM    GFR est AA >60 08/09/2018 05:19 AM    GFR est AA >60 08/08/2018 02:01 AM    GFR est AA >60 08/07/2018 10:30 PM    GFR est non-AA >60 08/10/2018 03:02 AM    GFR est non-AA >60 08/09/2018 05:19 AM    GFR est non-AA >60 08/08/2018 02:01 AM    GFR est non-AA >60 08/07/2018 10:30 PM    Calcium 9.5 08/10/2018 03:02 AM    Calcium 9.4 08/09/2018 05:19 AM    Calcium 9.1 08/08/2018 02:01 AM    Calcium 10.3 (H) 08/07/2018 10:30 PM    Bilirubin, total 0.3 08/08/2018 02:01 AM    Bilirubin, total 0.3 08/07/2018 10:30 PM    AST (SGOT) 110 (H) 08/08/2018 02:01 AM    AST (SGOT) 24 08/07/2018 10:30 PM    Alk. phosphatase 76 08/08/2018 02:01 AM    Alk.  phosphatase 94 08/07/2018 10:30 PM    Protein, total 6.2 (L) 08/08/2018 02:01 AM    Protein, total 7.8 08/07/2018 10:30 PM    Albumin 2.9 (L) 08/08/2018 02:01 AM    Albumin 3.7 08/07/2018 10:30 PM    Globulin 3.3 08/08/2018 02:01 AM    Globulin 4.1 (H) 08/07/2018 10:30 PM    A-G Ratio 0.9 (L) 08/08/2018 02:01 AM    A-G Ratio 0.9 (L) 08/07/2018 10:30 PM    ALT (SGPT) 28 08/08/2018 02:01 AM    ALT (SGPT) 23 08/07/2018 10:30 PM     No results found for: CPK, CPKX, CPX  Lab Results   Component Value Date/Time    Cholesterol, total 123 08/09/2018 05:19 AM    HDL Cholesterol 31 08/09/2018 05:19 AM    LDL, calculated 67.4 08/09/2018 05:19 AM    Triglyceride 123 08/09/2018 05:19 AM    CHOL/HDL Ratio 4.0 08/09/2018 05:19 AM     No results found for this or any previous visit. Assessment:         Patient Active Problem List    Diagnosis Date Noted    Coronary artery disease involving native coronary artery of native heart without angina pectoris 08/22/2018    STEMI involving right coronary artery ([facility] [address] 08/08/2018    Sciatica of left side 03/14/2014    Hypertension 03/14/2014     68 y.o. male is here for hospitla f/u--s/p STEMI of RCA 8/7/18--transferred to [facility], primary PCI/BARRIE to RCA. Had transient VT and chris issues early on, doing well post hosp d/c. LVEF 50%. On ASA, Brilinta, statin, lisinopril, prn NTG (no beta blocker currently due to chris). HR actually now running high, some PVC's. No CV sx or complaints. .     Plan:     Doing well with no adverse cardiac symptoms. Will add very low dose beta blocker--Metoprolol XL 12.5  Continue other meds   Lipids and labs followed by PCP. Continue current care and f/u in 3 months.     Alf Bradley MD

## 2018-08-24 ENCOUNTER — PATIENT OUTREACH (OUTPATIENT)
Dept: CARDIOLOGY CLINIC | Age: 78
End: 2018-08-24

## 2018-08-24 NOTE — PROGRESS NOTES
NN Note:    Reached out to Mr. John Barrientos. Patient returned home from his vacations. Patient saw Dr. Urvashi Pinto on 8/22. Reviewed med changes with patient- addition of Metoprolol. Patient has no complaints. 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 I love seeing and talking with all the people. \" Patient has my contact information and agrees to contact me for any needs.

## 2018-08-30 ENCOUNTER — PATIENT OUTREACH (OUTPATIENT)
Dept: CARDIOLOGY CLINIC | Age: 78
End: 2018-08-30

## 2018-08-30 NOTE — PROGRESS NOTES
NN Note:    Reached out to Mr. John Barrientos. Patient reports returning to work this week. Patient states he may consider cutting back from 5 days a week to 3. He is going to work tomorrow 8/31 and will discuss with his boss at that time. Patient has my contact information and agrees to call with any questions/concerns.

## 2018-10-25 RX ORDER — LISINOPRIL 5 MG/1
TABLET ORAL
Qty: 90 TAB | Refills: 0 | OUTPATIENT
Start: 2018-10-25

## 2018-10-26 RX ORDER — LISINOPRIL 5 MG/1
5 TABLET ORAL DAILY
Qty: 30 TAB | Refills: 1 | Status: SHIPPED | OUTPATIENT
Start: 2018-10-26 | End: 2019-11-05 | Stop reason: SDUPTHER

## 2018-10-26 NOTE — TELEPHONE ENCOUNTER
Verbal order per Dr. Juanis Brewster. Order (medication, dose, route, frequency) repeated and verified twice.

## 2018-11-30 ENCOUNTER — OFFICE VISIT (OUTPATIENT)
Dept: CARDIOLOGY CLINIC | Age: 78
End: 2018-11-30

## 2018-11-30 VITALS
DIASTOLIC BLOOD PRESSURE: 76 MMHG | BODY MASS INDEX: 23.4 KG/M2 | OXYGEN SATURATION: 98 % | WEIGHT: 158 LBS | HEART RATE: 58 BPM | HEIGHT: 69 IN | RESPIRATION RATE: 12 BRPM | SYSTOLIC BLOOD PRESSURE: 138 MMHG

## 2018-11-30 DIAGNOSIS — I10 ESSENTIAL HYPERTENSION: ICD-10-CM

## 2018-11-30 DIAGNOSIS — I25.10 CORONARY ARTERY DISEASE INVOLVING NATIVE CORONARY ARTERY OF NATIVE HEART WITHOUT ANGINA PECTORIS: Primary | ICD-10-CM

## 2018-11-30 DIAGNOSIS — E78.5 DYSLIPIDEMIA: ICD-10-CM

## 2018-11-30 DIAGNOSIS — Z98.61 S/P PTCA (PERCUTANEOUS TRANSLUMINAL CORONARY ANGIOPLASTY): ICD-10-CM

## 2018-11-30 DIAGNOSIS — I21.11 STEMI INVOLVING RIGHT CORONARY ARTERY (HCC): ICD-10-CM

## 2018-11-30 NOTE — PROGRESS NOTES
PATIENT ID VERIFIED WITH TWO PATIENT IDENTIFIERS. PATIENT MEDICATIONS REVIEWED AND APPROVED BY DR. Niels Blizzard. PATIENT DID NOT BRING A MEDICATION LIST TO APPOINTMENT. PATIENT WILL CALL TO UPDATE MEDICATION LIST. MEDICATIONS THAT WERE REMOVED FROM THIS VISIT HAVE BEEN APPROVED BY DR. Niels Blizzard. Chief Complaint Patient presents with  Coronary Artery Disease 3 MONTH FOLLOW UP  
 Hypertension 1. Have you been to the ER, urgent care clinic since your last visit? Hospitalized since your last visit? NO 
 
2. Have you seen or consulted any other health care providers outside of the [address] [place] [address] since your last visit? Include any pap smears or colon screening. YES PCP-Sandro Sykes one week ago for blood work

## 2018-11-30 NOTE — PROGRESS NOTES
Amador Horne is a 68 y.o. male is here for routine f/u. S/p STEMI of RCA 18--transferred to [facility], primary PCI/BARRIE to RCA. Had transient VT and chris issues early on, doing well post hosp d/c. LVEF 50%. On ASA, Brilinta, statin, lisinopril, low dose beta blocker. No current CV sx or complaints. The patient denies chest pain/ shortness of breath, orthopnea, PND, LE edema, palpitations, syncope, presyncope or fatigue. Patient Active Problem List  
 Diagnosis Date Noted  Dyslipidemia 2018  Coronary artery disease involving native coronary artery of native heart without angina pectoris 2018  STEMI involving right coronary artery ([facility] [address] 2018  Sciatica of left side 2014  Hypertension 2014 Charlotte Abraham NP Past Medical History:  
Diagnosis Date  CAD (coronary artery disease)  Hypercholesterolemia  Hypertension  Sciatica  STEMI (ST elevation myocardial infarction) ([facility] [address] 2018 RCA Past Surgical History:  
Procedure Laterality Date  HX PTCA  2018 PTCA/BARRIE RCA (STEMI) No Known Allergies No family history on file. Social History Socioeconomic History  Marital status:  Spouse name: Not on file  Number of children: Not on file  Years of education: Not on file  Highest education level: Not on file Social Needs  Financial resource strain: Not on file  Food insecurity - worry: Not on file  Food insecurity - inability: Not on file  Transportation needs - medical: Not on file  Transportation needs - non-medical: Not on file Occupational History  Not on file Tobacco Use  Smoking status: Former Smoker Last attempt to quit: 1992 Years since quittin.2  Smokeless tobacco: Never Used Substance and Sexual Activity  Alcohol use: No  
 Drug use: Not on file  Sexual activity: Not on file Other Topics Concern  Not on file Social History Narrative  Not on file Current Outpatient Medications Medication Sig  
 lisinopril (PRINIVIL, ZESTRIL) 5 mg tablet Take 1 Tab by mouth daily.  metoprolol succinate (TOPROL-XL) 25 mg XL tablet Take 0.5 Tabs by mouth daily.  nitroglycerin (NITROSTAT) 0.4 mg SL tablet 1 Tab by SubLINGual route every five (5) minutes as needed for Chest Pain.  aspirin 81 mg chewable tablet Take 1 Tab by mouth daily.  atorvastatin (LIPITOR) 40 mg tablet Take 1 Tab by mouth nightly.  ticagrelor (BRILINTA) 90 mg tablet Take 1 Tab by mouth every twelve (12) hours every twelve (12) hours. No current facility-administered medications for this visit. Review of Symptoms: 
 
CONST  No weight change. No fever, chills, sweats ENT No visual changes, URI sx, sore throat CV  See HPI  
RESP  No cough, or sputum, wheezing. Also see HPI  
GI  No abdominal pain or change in bowel habits. No heartburn or dysphagia. No melena or rectal bleeding.   No dysuria, urgency, frequency, hematuria MSKEL  No joint pain, swelling. No muscle pain. SKIN  No rash or lesions. NEURO  No headache, syncope, or seizure. No weakness, loss of sensation, or paresthesias. PSYCH  No low mood or depression No anxiety. HE/LYMPH  No easy bruising, abnormal bleeding, or enlarged glands. Physical ExamPhysical Exam:   
There were no vitals taken for this visit. Gen: NAD HEENT:  PERRL, throat clear Neck: no adenopathy, no thyromegaly, no JVD Heart:  Regular,Nl S1S2,  no murmur, gallop or rub.  
Lungs:  clear Abdomen:   Soft, non-tender, bowel sounds are active.  
Extremities:  No edema Pulse: symmetric Neuro: A&O times 3, No focal neuro deficits Cardiographics ECG: NSR, HR 58, PVC's, no acute changes Labs:  
Lab Results Component Value Date/Time  Sodium 142 08/10/2018 03:02 AM  
 Sodium 142 08/09/2018 05:19 AM  
 Sodium 141 08/08/2018 02:01 AM  
 Sodium 139 08/07/2018 10:30 PM  
 Potassium 3.7 08/10/2018 03:02 AM  
 Potassium 3.8 08/09/2018 05:19 AM  
 Potassium 4.0 08/08/2018 02:01 AM  
 Potassium 3.5 08/07/2018 10:30 PM  
 Chloride 110 (H) 08/10/2018 03:02 AM  
 Chloride 111 (H) 08/09/2018 05:19 AM  
 Chloride 110 (H) 08/08/2018 02:01 AM  
 Chloride 103 08/07/2018 10:30 PM  
 CO2 25 08/10/2018 03:02 AM  
 CO2 25 08/09/2018 05:19 AM  
 CO2 23 08/08/2018 02:01 AM  
 CO2 24 08/07/2018 10:30 PM  
 Anion gap 7 08/10/2018 03:02 AM  
 Anion gap 6 08/09/2018 05:19 AM  
 Anion gap 8 08/08/2018 02:01 AM  
 Anion gap 12 08/07/2018 10:30 PM  
 Glucose 94 08/10/2018 03:02 AM  
 Glucose 94 08/09/2018 05:19 AM  
 Glucose 133 (H) 08/08/2018 02:01 AM  
 Glucose 206 (H) 08/07/2018 10:30 PM  
 BUN 19 08/10/2018 03:02 AM  
 BUN 17 08/09/2018 05:19 AM  
 BUN 16 08/08/2018 02:01 AM  
 BUN 16 08/07/2018 10:30 PM  
 Creatinine 0.85 08/10/2018 03:02 AM  
 Creatinine 0.88 08/09/2018 05:19 AM  
 Creatinine 0.81 08/08/2018 02:01 AM  
 Creatinine 1.08 08/07/2018 10:30 PM  
 BUN/Creatinine ratio 22 (H) 08/10/2018 03:02 AM  
 BUN/Creatinine ratio 19 08/09/2018 05:19 AM  
 BUN/Creatinine ratio 20 08/08/2018 02:01 AM  
 BUN/Creatinine ratio 15 08/07/2018 10:30 PM  
 GFR est AA >60 08/10/2018 03:02 AM  
 GFR est AA >60 08/09/2018 05:19 AM  
 GFR est AA >60 08/08/2018 02:01 AM  
 GFR est AA >60 08/07/2018 10:30 PM  
 GFR est non-AA >60 08/10/2018 03:02 AM  
 GFR est non-AA >60 08/09/2018 05:19 AM  
 GFR est non-AA >60 08/08/2018 02:01 AM  
 GFR est non-AA >60 08/07/2018 10:30 PM  
 Calcium 9.5 08/10/2018 03:02 AM  
 Calcium 9.4 08/09/2018 05:19 AM  
 Calcium 9.1 08/08/2018 02:01 AM  
 Calcium 10.3 (H) 08/07/2018 10:30 PM  
 Bilirubin, total 0.3 08/08/2018 02:01 AM  
 Bilirubin, total 0.3 08/07/2018 10:30 PM  
 AST (SGOT) 110 (H) 08/08/2018 02:01 AM  
 AST (SGOT) 24 08/07/2018 10:30 PM  
 Alk. phosphatase 76 08/08/2018 02:01 AM  
 Alk.  phosphatase 94 08/07/2018 10:30 PM  
 Protein, total 6.2 (L) 08/08/2018 02:01 AM  
 Protein, total 7.8 08/07/2018 10:30 PM  
 Albumin 2.9 (L) 08/08/2018 02:01 AM  
 Albumin 3.7 08/07/2018 10:30 PM  
 Globulin 3.3 08/08/2018 02:01 AM  
 Globulin 4.1 (H) 08/07/2018 10:30 PM  
 A-G Ratio 0.9 (L) 08/08/2018 02:01 AM  
 A-G Ratio 0.9 (L) 08/07/2018 10:30 PM  
 ALT (SGPT) 28 08/08/2018 02:01 AM  
 ALT (SGPT) 23 08/07/2018 10:30 PM  
 
No results found for: CPK, CPKX, CPX Lab Results Component Value Date/Time Cholesterol, total 123 08/09/2018 05:19 AM  
 HDL Cholesterol 31 08/09/2018 05:19 AM  
 LDL, calculated 67.4 08/09/2018 05:19 AM  
 Triglyceride 123 08/09/2018 05:19 AM  
 CHOL/HDL Ratio 4.0 08/09/2018 05:19 AM  
 
No results found for this or any previous visit. Assessment:  
  
  
Patient Active Problem List  
 Diagnosis Date Noted  Dyslipidemia 11/30/2018  Coronary artery disease involving native coronary artery of native heart without angina pectoris 08/22/2018  STEMI involving right coronary artery ([facility] [address] 08/08/2018  Sciatica of left side 03/14/2014  Hypertension 03/14/2014 S/p STEMI of RCA 8/7/18--transferred to [facility], primary PCI/BARRIE to RCA. Had transient VT and chris issues early on, doing well post hosp d/c. LVEF 50%. On ASA, Brilinta, statin, lisinopril, low dose beta blocker. No current CV sx or complaints. Plan:  
 
Doing well with no adverse cardiac symptoms. Lipids and labs followed by PCP. Continue current care and f/u in 6 months.  
 
Deysi Verdugo MD

## 2019-05-09 RX ORDER — ATORVASTATIN CALCIUM 40 MG/1
TABLET, FILM COATED ORAL
Qty: 90 TAB | Refills: 2 | OUTPATIENT
Start: 2019-05-09

## 2019-05-10 RX ORDER — ATORVASTATIN CALCIUM 40 MG/1
40 TABLET, FILM COATED ORAL
Qty: 90 TAB | Refills: 1 | Status: SHIPPED | OUTPATIENT
Start: 2019-05-10 | End: 2020-02-04

## 2019-05-10 RX ORDER — ATORVASTATIN CALCIUM 40 MG/1
40 TABLET, FILM COATED ORAL
Qty: 90 TAB | Refills: 1 | Status: SHIPPED | OUTPATIENT
Start: 2019-05-10 | End: 2019-05-10 | Stop reason: SDUPTHER

## 2019-06-26 ENCOUNTER — OFFICE VISIT (OUTPATIENT)
Dept: CARDIOLOGY CLINIC | Age: 79
End: 2019-06-26

## 2019-06-26 VITALS
RESPIRATION RATE: 12 BRPM | HEIGHT: 69 IN | SYSTOLIC BLOOD PRESSURE: 138 MMHG | WEIGHT: 158 LBS | DIASTOLIC BLOOD PRESSURE: 72 MMHG | OXYGEN SATURATION: 95 % | BODY MASS INDEX: 23.4 KG/M2 | HEART RATE: 57 BPM

## 2019-06-26 DIAGNOSIS — I49.3 PVC'S (PREMATURE VENTRICULAR CONTRACTIONS): ICD-10-CM

## 2019-06-26 DIAGNOSIS — I10 ESSENTIAL HYPERTENSION: ICD-10-CM

## 2019-06-26 DIAGNOSIS — I25.10 CORONARY ARTERY DISEASE INVOLVING NATIVE CORONARY ARTERY OF NATIVE HEART WITHOUT ANGINA PECTORIS: Primary | ICD-10-CM

## 2019-06-26 DIAGNOSIS — Z98.61 S/P PTCA (PERCUTANEOUS TRANSLUMINAL CORONARY ANGIOPLASTY): ICD-10-CM

## 2019-06-26 DIAGNOSIS — E78.5 DYSLIPIDEMIA: ICD-10-CM

## 2019-06-26 DIAGNOSIS — I21.11 STEMI INVOLVING RIGHT CORONARY ARTERY (HCC): ICD-10-CM

## 2019-06-26 DIAGNOSIS — R00.1 BRADYCARDIA: ICD-10-CM

## 2019-06-26 DIAGNOSIS — I10 HYPERTENSION, UNSPECIFIED TYPE: ICD-10-CM

## 2019-06-26 RX ORDER — NITROGLYCERIN 0.4 MG/1
0.4 TABLET SUBLINGUAL
Qty: 1 BOTTLE | Refills: 2 | Status: SHIPPED | OUTPATIENT
Start: 2019-06-26 | End: 2020-07-28

## 2019-06-26 NOTE — PROGRESS NOTES
PATIENT ID VERIFIED WITH TWO PATIENT IDENTIFIERS. PATIENT MEDICATIONS REVIEWED AND APPROVED BY DR. Dorota Cohen. MEDICATIONS THAT WERE REMOVED FROM THIS VISIT HAVE BEEN APPROVED BY DR. Dorota Cohen. Chief Complaint   Patient presents with    Coronary Artery Disease     6 mo f/u    Hypertension    Cholesterol Problem       1. Have you been to the ER, urgent care clinic since your last visit? Hospitalized since your last visit?no    2. Have you seen or consulted any other health care providers outside of the [address] [place] [address] since your last visit? Include any pap smears or colon screening.  Yes PCP Allie Card in May 2019 for allergies

## 2019-06-26 NOTE — PROGRESS NOTES
Pankaj Choudhury is a 66 y.o. male is here for routine f/u. S/p STEMI of RCA 18--transferred to [facility], primary PCI/BARRIE to RCA.  Had transient VT and chris issues early on, doing well post hosp d/c.  LVEF 50%. On ASA, Brilinta, statin, lisinopril, low dose beta blocker. No current CV sx or complaints. The patient denies chest pain/ shortness of breath, orthopnea, PND, LE edema, palpitations, syncope, presyncope or fatigue. Patient Active Problem List    Diagnosis Date Noted    Dyslipidemia 2018    Coronary artery disease involving native coronary artery of native heart without angina pectoris 2018    STEMI involving right coronary artery ([facility] [address] 2018    Sciatica of left side 2014    Hypertension 2014      Yoly Sykes NP  Past Medical History:   Diagnosis Date    CAD (coronary artery disease)     Hypercholesterolemia     Hypertension     Sciatica     STEMI (ST elevation myocardial infarction) ([facility] [address] 2018    RCA      Past Surgical History:   Procedure Laterality Date    HX PTCA  2018    PTCA/BARRIE RCA (STEMI)     No Known Allergies   No family history on file.    Social History     Socioeconomic History    Marital status:      Spouse name: Not on file    Number of children: Not on file    Years of education: Not on file    Highest education level: Not on file   Occupational History    Not on file   Social Needs    Financial resource strain: Not on file    Food insecurity:     Worry: Not on file     Inability: Not on file    Transportation needs:     Medical: Not on file     Non-medical: Not on file   Tobacco Use    Smoking status: Former Smoker     Last attempt to quit: 1992     Years since quittin.8    Smokeless tobacco: Never Used   Substance and Sexual Activity    Alcohol use: No    Drug use: Not on file    Sexual activity: Not on file   Lifestyle    Physical activity:     Days per week: Not on file Minutes per session: Not on file    Stress: Not on file   Relationships    Social connections:     Talks on phone: Not on file     Gets together: Not on file     Attends [employer] service: Not on file     Active member of club or organization: Not on file     Attends meetings of clubs or organizations: Not on file     Relationship status: Not on file    Intimate partner violence:     Fear of current or ex partner: Not on file     Emotionally abused: Not on file     Physically abused: Not on file     Forced sexual activity: Not on file   Other Topics Concern    Not on file   Social History Narrative    Not on file      Current Outpatient Medications   Medication Sig    atorvastatin (LIPITOR) 40 mg tablet Take 1 Tab by mouth nightly.  lisinopril (PRINIVIL, ZESTRIL) 5 mg tablet Take 1 Tab by mouth daily.  metoprolol succinate (TOPROL-XL) 25 mg XL tablet Take 0.5 Tabs by mouth daily.  nitroglycerin (NITROSTAT) 0.4 mg SL tablet 1 Tab by SubLINGual route every five (5) minutes as needed for Chest Pain.  aspirin 81 mg chewable tablet Take 1 Tab by mouth daily.  ticagrelor (BRILINTA) 90 mg tablet Take 1 Tab by mouth every twelve (12) hours every twelve (12) hours. No current facility-administered medications for this visit. Review of Symptoms:    CONST  No weight change. No fever, chills, sweats    ENT No visual changes, URI sx, sore throat    CV  See HPI   RESP  No cough, or sputum, wheezing. Also see HPI   GI  No abdominal pain or change in bowel habits. No heartburn or dysphagia. No melena or rectal bleeding.   No dysuria, urgency, frequency, hematuria   MSKEL  No joint pain, swelling. No muscle pain. SKIN  No rash or lesions. NEURO  No headache, syncope, or seizure. No weakness, loss of sensation, or paresthesias. PSYCH  No low mood or depression  No anxiety. HE/LYMPH  No easy bruising, abnormal bleeding, or enlarged glands.         Physical ExamPhysical Exam:    There were no vitals taken for this visit.   Gen: NAD  HEENT:  PERRL, throat clear  Neck: no adenopathy, no thyromegaly, no JVD   Heart:  Regular,Nl S1S2,  no murmur, gallop or rub.   Lungs:  clear  Abdomen:   Soft, non-tender, bowel sounds are active.   Extremities:  No edema  Pulse: symmetric  Neuro: A&O times 3, No focal neuro deficits    Cardiographics    ECG: NSR, PVC\"s, no acute changes      Labs:   Lab Results   Component Value Date/Time    Sodium 142 08/10/2018 03:02 AM    Sodium 142 08/09/2018 05:19 AM    Sodium 141 08/08/2018 02:01 AM    Sodium 139 08/07/2018 10:30 PM    Potassium 3.7 08/10/2018 03:02 AM    Potassium 3.8 08/09/2018 05:19 AM    Potassium 4.0 08/08/2018 02:01 AM    Potassium 3.5 08/07/2018 10:30 PM    Chloride 110 (H) 08/10/2018 03:02 AM    Chloride 111 (H) 08/09/2018 05:19 AM    Chloride 110 (H) 08/08/2018 02:01 AM    Chloride 103 08/07/2018 10:30 PM    CO2 25 08/10/2018 03:02 AM    CO2 25 08/09/2018 05:19 AM    CO2 23 08/08/2018 02:01 AM    CO2 24 08/07/2018 10:30 PM    Anion gap 7 08/10/2018 03:02 AM    Anion gap 6 08/09/2018 05:19 AM    Anion gap 8 08/08/2018 02:01 AM    Anion gap 12 08/07/2018 10:30 PM    Glucose 94 08/10/2018 03:02 AM    Glucose 94 08/09/2018 05:19 AM    Glucose 133 (H) 08/08/2018 02:01 AM    Glucose 206 (H) 08/07/2018 10:30 PM    BUN 19 08/10/2018 03:02 AM    BUN 17 08/09/2018 05:19 AM    BUN 16 08/08/2018 02:01 AM    BUN 16 08/07/2018 10:30 PM    Creatinine 0.85 08/10/2018 03:02 AM    Creatinine 0.88 08/09/2018 05:19 AM    Creatinine 0.81 08/08/2018 02:01 AM    Creatinine 1.08 08/07/2018 10:30 PM    BUN/Creatinine ratio 22 (H) 08/10/2018 03:02 AM    BUN/Creatinine ratio 19 08/09/2018 05:19 AM    BUN/Creatinine ratio 20 08/08/2018 02:01 AM    BUN/Creatinine ratio 15 08/07/2018 10:30 PM    GFR est AA >60 08/10/2018 03:02 AM    GFR est AA >60 08/09/2018 05:19 AM    GFR est AA >60 08/08/2018 02:01 AM    GFR est AA >60 08/07/2018 10:30 PM    GFR est non-AA >60 08/10/2018 03:02 AM    GFR est non-AA >60 08/09/2018 05:19 AM    GFR est non-AA >60 08/08/2018 02:01 AM    GFR est non-AA >60 08/07/2018 10:30 PM    Calcium 9.5 08/10/2018 03:02 AM    Calcium 9.4 08/09/2018 05:19 AM    Calcium 9.1 08/08/2018 02:01 AM    Calcium 10.3 (H) 08/07/2018 10:30 PM    Bilirubin, total 0.3 08/08/2018 02:01 AM    Bilirubin, total 0.3 08/07/2018 10:30 PM    AST (SGOT) 110 (H) 08/08/2018 02:01 AM    AST (SGOT) 24 08/07/2018 10:30 PM    Alk. phosphatase 76 08/08/2018 02:01 AM    Alk. phosphatase 94 08/07/2018 10:30 PM    Protein, total 6.2 (L) 08/08/2018 02:01 AM    Protein, total 7.8 08/07/2018 10:30 PM    Albumin 2.9 (L) 08/08/2018 02:01 AM    Albumin 3.7 08/07/2018 10:30 PM    Globulin 3.3 08/08/2018 02:01 AM    Globulin 4.1 (H) 08/07/2018 10:30 PM    A-G Ratio 0.9 (L) 08/08/2018 02:01 AM    A-G Ratio 0.9 (L) 08/07/2018 10:30 PM    ALT (SGPT) 28 08/08/2018 02:01 AM    ALT (SGPT) 23 08/07/2018 10:30 PM     No results found for: CPK, CPKX, CPX  Lab Results   Component Value Date/Time    Cholesterol, total 123 08/09/2018 05:19 AM    HDL Cholesterol 31 08/09/2018 05:19 AM    LDL, calculated 67.4 08/09/2018 05:19 AM    Triglyceride 123 08/09/2018 05:19 AM    CHOL/HDL Ratio 4.0 08/09/2018 05:19 AM     No results found for this or any previous visit. Assessment:         Patient Active Problem List    Diagnosis Date Noted    Dyslipidemia 11/30/2018    Coronary artery disease involving native coronary artery of native heart without angina pectoris 08/22/2018    STEMI involving right coronary artery ([facility] [address] 08/08/2018    Sciatica of left side 03/14/2014    Hypertension 03/14/2014     S/p STEMI of RCA 8/7/18--transferred to [facility], primary PCI/BARRIE to RCA.  Had transient VT and chris issues early on, doing well post hosp d/c.  LVEF 50%. On ASA, Brilinta, statin, lisinopril, low dose beta blocker. No current CV sx or complaints. Plan:     Doing well with no adverse cardiac symptoms.   Ok to stop Brilinta 8/19--one year post MI/PCI. Lipids and labs followed by PCP. Continue current care and f/u in 6 months.     Sharon Recio MD

## 2019-08-08 RX ORDER — GUAIFENESIN 100 MG/5ML
81 LIQUID (ML) ORAL DAILY
Qty: 90 TAB | Refills: 3 | Status: SHIPPED | OUTPATIENT
Start: 2019-08-08 | End: 2020-07-06

## 2019-08-08 NOTE — TELEPHONE ENCOUNTER
Patient called and he needs a refill sent over to [facility] pharmacy for his 81mg chewable aspirin.   Please call [phone]

## 2019-11-05 RX ORDER — LISINOPRIL 5 MG/1
5 TABLET ORAL DAILY
Qty: 30 TAB | Refills: 2 | Status: SHIPPED | OUTPATIENT
Start: 2019-11-05 | End: 2020-01-13

## 2019-11-05 NOTE — TELEPHONE ENCOUNTER
Requested Prescriptions     Pending Prescriptions Disp Refills    lisinopril (PRINIVIL, ZESTRIL) 5 mg tablet 30 Tab 1     Sig: Take 1 Tab by mouth daily.

## 2020-01-13 ENCOUNTER — OFFICE VISIT (OUTPATIENT)
Dept: CARDIOLOGY CLINIC | Age: 80
End: 2020-01-13

## 2020-01-13 VITALS
BODY MASS INDEX: 25.18 KG/M2 | SYSTOLIC BLOOD PRESSURE: 162 MMHG | HEIGHT: 69 IN | RESPIRATION RATE: 14 BRPM | WEIGHT: 170 LBS | OXYGEN SATURATION: 98 % | HEART RATE: 64 BPM | DIASTOLIC BLOOD PRESSURE: 84 MMHG

## 2020-01-13 DIAGNOSIS — E78.5 DYSLIPIDEMIA: ICD-10-CM

## 2020-01-13 DIAGNOSIS — Z98.61 S/P PTCA (PERCUTANEOUS TRANSLUMINAL CORONARY ANGIOPLASTY): ICD-10-CM

## 2020-01-13 DIAGNOSIS — I10 ESSENTIAL HYPERTENSION: ICD-10-CM

## 2020-01-13 DIAGNOSIS — I21.11 STEMI INVOLVING RIGHT CORONARY ARTERY (HCC): ICD-10-CM

## 2020-01-13 DIAGNOSIS — I25.10 CORONARY ARTERY DISEASE INVOLVING NATIVE CORONARY ARTERY OF NATIVE HEART WITHOUT ANGINA PECTORIS: Primary | ICD-10-CM

## 2020-01-13 RX ORDER — LISINOPRIL 20 MG/1
20 TABLET ORAL DAILY
Qty: 90 TAB | Refills: 3 | Status: SHIPPED | OUTPATIENT
Start: 2020-01-13 | End: 2020-07-21

## 2020-01-13 NOTE — PROGRESS NOTES
Amber Cancino is a 78 y.o. male is here for routine f/u. S/p STEMI of RCA 18--transferred to [facility], primary PCI/BARRIE to RCA.  Had transient VT and chris issues early on, doing well post hosp d/c.  LVEF 50%. On ASA,  statin, lisinopril, low dose beta blocker. Grecia Edward stopped  one year post MI/PCI.  No current CV sx or complaints. Continues to see PCP. BP has been running high--currently on lisinopril 5 (and had stopped the 12.5 mg metoprolol previously). The patient denies chest pain/ shortness of breath, orthopnea, PND, LE edema, palpitations, syncope, presyncope or fatigue. Patient Active Problem List    Diagnosis Date Noted    Dyslipidemia 2018    Coronary artery disease involving native coronary artery of native heart without angina pectoris 2018    STEMI involving right coronary artery ([facility] [address] 2018    Sciatica of left side 2014    Hypertension 2014      Sonya Sykes NP  Past Medical History:   Diagnosis Date    CAD (coronary artery disease)     Hypercholesterolemia     Hypertension     Sciatica     STEMI (ST elevation myocardial infarction) ([facility] [address] 2018    RCA      Past Surgical History:   Procedure Laterality Date    HX PTCA  2018    PTCA/BARRIE RCA (STEMI)     No Known Allergies   History reviewed. No pertinent family history.    Social History     Socioeconomic History    Marital status:      Spouse name: Not on file    Number of children: Not on file    Years of education: Not on file    Highest education level: Not on file   Occupational History    Not on file   Social Needs    Financial resource strain: Not on file    Food insecurity:     Worry: Not on file     Inability: Not on file    Transportation needs:     Medical: Not on file     Non-medical: Not on file   Tobacco Use    Smoking status: Former Smoker     Last attempt to quit: 1992     Years since quittin.4    Smokeless tobacco: Never Used Substance and Sexual Activity    Alcohol use: No    Drug use: Not on file    Sexual activity: Not on file   Lifestyle    Physical activity:     Days per week: Not on file     Minutes per session: Not on file    Stress: Not on file   Relationships    Social connections:     Talks on phone: Not on file     Gets together: Not on file     Attends [employer] service: Not on file     Active member of club or organization: Not on file     Attends meetings of clubs or organizations: Not on file     Relationship status: Not on file    Intimate partner violence:     Fear of current or ex partner: Not on file     Emotionally abused: Not on file     Physically abused: Not on file     Forced sexual activity: Not on file   Other Topics Concern    Not on file   Social History Narrative    Not on file      Current Outpatient Medications   Medication Sig    lisinopril (PRINIVIL, ZESTRIL) 20 mg tablet Take 1 Tab by mouth daily.  aspirin 81 mg chewable tablet Take 1 Tab by mouth daily.  atorvastatin (LIPITOR) 40 mg tablet Take 1 Tab by mouth nightly.  nitroglycerin (NITROSTAT) 0.4 mg SL tablet 1 Tab by SubLINGual route every five (5) minutes as needed for Chest Pain. No current facility-administered medications for this visit. Review of Symptoms:    CONST  No weight change. No fever, chills, sweats    ENT No visual changes, URI sx, sore throat    CV  See HPI   RESP  No cough, or sputum, wheezing. Also see HPI   GI  No abdominal pain or change in bowel habits. No heartburn or dysphagia. No melena or rectal bleeding.   No dysuria, urgency, frequency, hematuria   MSKEL  No joint pain, swelling. No muscle pain. SKIN  No rash or lesions. NEURO  No headache, syncope, or seizure. No weakness, loss of sensation, or paresthesias. PSYCH  No low mood or depression  No anxiety. HE/LYMPH  No easy bruising, abnormal bleeding, or enlarged glands.         Physical ExamPhysical Exam:    Visit Vitals  /84 (BP 1 Location: Left arm, BP Patient Position: Sitting)   Pulse 64   Resp 14   Ht 5' 9\" (1.753 m)   Wt 170 lb (77.1 kg)   SpO2 98% Comment: ra   BMI 25.10 kg/m²     Gen: NAD  HEENT:  PERRL, throat clear  Neck: no adenopathy, no thyromegaly, no JVD   Heart:  Regular,Nl S1S2,  no murmur, gallop or rub.   Lungs:  clear  Abdomen:   Soft, non-tender, bowel sounds are active.   Extremities:  No edema  Pulse: symmetric  Neuro: A&O times 3, No focal neuro deficits    Cardiographics    ECG: NSR, wnl    Labs:   Lab Results   Component Value Date/Time    Sodium 142 08/10/2018 03:02 AM    Sodium 142 08/09/2018 05:19 AM    Sodium 141 08/08/2018 02:01 AM    Sodium 139 08/07/2018 10:30 PM    Potassium 3.7 08/10/2018 03:02 AM    Potassium 3.8 08/09/2018 05:19 AM    Potassium 4.0 08/08/2018 02:01 AM    Potassium 3.5 08/07/2018 10:30 PM    Chloride 110 (H) 08/10/2018 03:02 AM    Chloride 111 (H) 08/09/2018 05:19 AM    Chloride 110 (H) 08/08/2018 02:01 AM    Chloride 103 08/07/2018 10:30 PM    CO2 25 08/10/2018 03:02 AM    CO2 25 08/09/2018 05:19 AM    CO2 23 08/08/2018 02:01 AM    CO2 24 08/07/2018 10:30 PM    Anion gap 7 08/10/2018 03:02 AM    Anion gap 6 08/09/2018 05:19 AM    Anion gap 8 08/08/2018 02:01 AM    Anion gap 12 08/07/2018 10:30 PM    Glucose 94 08/10/2018 03:02 AM    Glucose 94 08/09/2018 05:19 AM    Glucose 133 (H) 08/08/2018 02:01 AM    Glucose 206 (H) 08/07/2018 10:30 PM    BUN 19 08/10/2018 03:02 AM    BUN 17 08/09/2018 05:19 AM    BUN 16 08/08/2018 02:01 AM    BUN 16 08/07/2018 10:30 PM    Creatinine 0.85 08/10/2018 03:02 AM    Creatinine 0.88 08/09/2018 05:19 AM    Creatinine 0.81 08/08/2018 02:01 AM    Creatinine 1.08 08/07/2018 10:30 PM    BUN/Creatinine ratio 22 (H) 08/10/2018 03:02 AM    BUN/Creatinine ratio 19 08/09/2018 05:19 AM    BUN/Creatinine ratio 20 08/08/2018 02:01 AM    BUN/Creatinine ratio 15 08/07/2018 10:30 PM    GFR est AA >60 08/10/2018 03:02 AM    GFR est AA >60 08/09/2018 05:19 AM    GFR est AA >60 08/08/2018 02:01 AM    GFR est AA >60 08/07/2018 10:30 PM    GFR est non-AA >60 08/10/2018 03:02 AM    GFR est non-AA >60 08/09/2018 05:19 AM    GFR est non-AA >60 08/08/2018 02:01 AM    GFR est non-AA >60 08/07/2018 10:30 PM    Calcium 9.5 08/10/2018 03:02 AM    Calcium 9.4 08/09/2018 05:19 AM    Calcium 9.1 08/08/2018 02:01 AM    Calcium 10.3 (H) 08/07/2018 10:30 PM    Bilirubin, total 0.3 08/08/2018 02:01 AM    Bilirubin, total 0.3 08/07/2018 10:30 PM    AST (SGOT) 110 (H) 08/08/2018 02:01 AM    AST (SGOT) 24 08/07/2018 10:30 PM    Alk. phosphatase 76 08/08/2018 02:01 AM    Alk. phosphatase 94 08/07/2018 10:30 PM    Protein, total 6.2 (L) 08/08/2018 02:01 AM    Protein, total 7.8 08/07/2018 10:30 PM    Albumin 2.9 (L) 08/08/2018 02:01 AM    Albumin 3.7 08/07/2018 10:30 PM    Globulin 3.3 08/08/2018 02:01 AM    Globulin 4.1 (H) 08/07/2018 10:30 PM    A-G Ratio 0.9 (L) 08/08/2018 02:01 AM    A-G Ratio 0.9 (L) 08/07/2018 10:30 PM    ALT (SGPT) 28 08/08/2018 02:01 AM    ALT (SGPT) 23 08/07/2018 10:30 PM     No results found for: CPK, CPKX, CPX  Lab Results   Component Value Date/Time    Cholesterol, total 123 08/09/2018 05:19 AM    HDL Cholesterol 31 08/09/2018 05:19 AM    LDL, calculated 67.4 08/09/2018 05:19 AM    Triglyceride 123 08/09/2018 05:19 AM    CHOL/HDL Ratio 4.0 08/09/2018 05:19 AM     No results found for this or any previous visit. Assessment:         Patient Active Problem List    Diagnosis Date Noted    Dyslipidemia 11/30/2018    Coronary artery disease involving native coronary artery of native heart without angina pectoris 08/22/2018    STEMI involving right coronary artery ([facility] [address] 08/08/2018    Sciatica of left side 03/14/2014    Hypertension 03/14/2014     S/p STEMI of RCA 8/7/18--transferred to [facility], primary PCI/BARRIE to RCA.  Had transient VT and chris issues early on, doing well post hosp d/c.  LVEF 50%. On ASA,  statin, lisinopril, low dose beta blocker.  Brilinta stopped 8/19 one year post MI/PCI.  No current CV sx or complaints. Continues to see PCP. BP has been running high--currently on lisinopril 5 (and had stopped the 12.5 mg metoprolol previously). Plan:     Doing well with no adverse cardiac symptoms. BP running high--will INCREASE the Lisinopril to 20mg every day (now on 5)  Continue atorvastatin 40 and low dose ASA   Lipids and labs followed by PCP. Continue current care and f/u in 6 months.     Izzy Carter MD

## 2020-01-13 NOTE — LETTER
1/13/20 Patient: Marybel Conception YOB: 1940 Date of Visit: 1/13/2020 Meet Dent NP 
[address] [place] [address] VIA Facsimile: [phone] Dear Meet Dent NP, Thank you for referring Mr. Mindi Haider to [facility] for evaluation. My notes for this consultation are attached. If you have questions, please do not hesitate to call me. I look forward to following your patient along with you.  
 
 
Sincerely, 
 
Carli Napoles MD

## 2020-01-13 NOTE — PROGRESS NOTES
Verified patient with two patient identifiers. Medications reviewed/approved by Dr. Leisa Ramirez. Chief Complaint   Patient presents with    Coronary Artery Disease     6 month follow up    Hypertension    Cholesterol Problem     1. Have you been to the ER, urgent care clinic since your last visit? Hospitalized since your last visit?no    2. Have you seen or consulted any other health care providers outside of the [address] [place] [address] since your last visit? Include any pap smears or colon screening. Yes, pcp  last seen 12/6/19 for URI. Dr. Joanne Leyva eye md- last seen 12/12/19 for right posterior capsular opacification.

## 2020-07-06 RX ORDER — ATORVASTATIN CALCIUM 40 MG/1
TABLET, FILM COATED ORAL
Qty: 90 TAB | Refills: 0 | Status: SHIPPED | OUTPATIENT
Start: 2020-07-06 | End: 2020-10-06

## 2020-07-06 RX ORDER — ASPIRIN 81 MG/1
TABLET ORAL
Qty: 90 TAB | Refills: 0 | Status: SHIPPED | OUTPATIENT
Start: 2020-07-06 | End: 2020-10-30

## 2020-07-21 ENCOUNTER — OFFICE VISIT (OUTPATIENT)
Dept: CARDIOLOGY CLINIC | Age: 80
End: 2020-07-21

## 2020-07-21 VITALS
HEART RATE: 62 BPM | DIASTOLIC BLOOD PRESSURE: 84 MMHG | SYSTOLIC BLOOD PRESSURE: 150 MMHG | TEMPERATURE: 98.7 F | WEIGHT: 164 LBS | HEIGHT: 69 IN | BODY MASS INDEX: 24.29 KG/M2 | OXYGEN SATURATION: 96 % | RESPIRATION RATE: 14 BRPM

## 2020-07-21 DIAGNOSIS — Z98.61 S/P PTCA (PERCUTANEOUS TRANSLUMINAL CORONARY ANGIOPLASTY): ICD-10-CM

## 2020-07-21 DIAGNOSIS — E78.5 DYSLIPIDEMIA: ICD-10-CM

## 2020-07-21 DIAGNOSIS — I25.10 CORONARY ARTERY DISEASE INVOLVING NATIVE CORONARY ARTERY OF NATIVE HEART WITHOUT ANGINA PECTORIS: Primary | ICD-10-CM

## 2020-07-21 DIAGNOSIS — I10 ESSENTIAL HYPERTENSION: ICD-10-CM

## 2020-07-21 RX ORDER — LISINOPRIL 20 MG/1
30 TABLET ORAL DAILY
Qty: 135 TAB | Refills: 1
Start: 2020-07-21 | End: 2021-01-06

## 2020-07-21 NOTE — PROGRESS NOTES
Bora Russell is a 78 y.o. male is here for routine f/u. S/p STEMI of RCA 18--transferred to [facility], primary PCI/BARRIE to RCA.  Had transient VT and chris issues early on, doing well post hosp d/c.  LVEF 50%. On ASA,  statin, lisinopril, low dose beta blocker. Selam Kennedy stopped  one year post MI/PCI.  No current CV sx or complaints. Continues to see PCP.  BP has been running high--lisinopril increased last OV here. The patient denies chest pain/ shortness of breath, orthopnea, PND, LE edema, palpitations, syncope, presyncope or fatigue. Patient Active Problem List    Diagnosis Date Noted    Dyslipidemia 2018    Coronary artery disease involving native coronary artery of native heart without angina pectoris 2018    STEMI involving right coronary artery ([facility] [address] 2018    Sciatica of left side 2014    Hypertension 2014      Dustin Sykes, NP  Past Medical History:   Diagnosis Date    CAD (coronary artery disease)     Hypercholesterolemia     Hypertension     Sciatica     STEMI (ST elevation myocardial infarction) ([facility] [address] 2018    RCA      Past Surgical History:   Procedure Laterality Date    HX PTCA  2018    PTCA/BARRIE RCA (STEMI)     No Known Allergies   No family history on file.    Social History     Socioeconomic History    Marital status:      Spouse name: Not on file    Number of children: Not on file    Years of education: Not on file    Highest education level: Not on file   Occupational History    Not on file   Social Needs    Financial resource strain: Not on file    Food insecurity     Worry: Not on file     Inability: Not on file    Transportation needs     Medical: Not on file     Non-medical: Not on file   Tobacco Use    Smoking status: Former Smoker     Last attempt to quit: 1992     Years since quittin.9    Smokeless tobacco: Never Used   Substance and Sexual Activity    Alcohol use: No    Drug use: Not on file    Sexual activity: Not on file   Lifestyle    Physical activity     Days per week: Not on file     Minutes per session: Not on file    Stress: Not on file   Relationships    Social connections     Talks on phone: Not on file     Gets together: Not on file     Attends [employer] service: Not on file     Active member of club or organization: Not on file     Attends meetings of clubs or organizations: Not on file     Relationship status: Not on file    Intimate partner violence     Fear of current or ex partner: Not on file     Emotionally abused: Not on file     Physically abused: Not on file     Forced sexual activity: Not on file   Other Topics Concern    Not on file   Social History Narrative    Not on file      Current Outpatient Medications   Medication Sig    atorvastatin (LIPITOR) 40 mg tablet TAKE 1 TABLET BY MOUTH EVERY EVENING    aspirin delayed-release 81 mg tablet TAKE 1 TABLET BY MOUTH EVERY DAY    lisinopril (PRINIVIL, ZESTRIL) 20 mg tablet Take 1 Tab by mouth daily.  nitroglycerin (NITROSTAT) 0.4 mg SL tablet 1 Tab by SubLINGual route every five (5) minutes as needed for Chest Pain. No current facility-administered medications for this visit. Review of Symptoms:    CONST  No weight change. No fever, chills, sweats    ENT No visual changes, URI sx, sore throat    CV  See HPI   RESP  No cough, or sputum, wheezing. Also see HPI   GI  No abdominal pain or change in bowel habits. No heartburn or dysphagia. No melena or rectal bleeding.   No dysuria, urgency, frequency, hematuria   MSKEL  No joint pain, swelling. No muscle pain. SKIN  No rash or lesions. NEURO  No headache, syncope, or seizure. No weakness, loss of sensation, or paresthesias. PSYCH  No low mood or depression  No anxiety. HE/LYMPH  No easy bruising, abnormal bleeding, or enlarged glands.         Physical ExamPhysical Exam:    Visit Vitals  /84 (BP 1 Location: Left arm, BP Patient Position: Sitting)   Pulse 62   Temp 98.7 °F (37.1 °C) (Temporal)   Resp 14   Ht 5' 9\" (1.753 m)   Wt 164 lb (74.4 kg)   SpO2 96% Comment: ra   BMI 24.22 kg/m²     BP rechecked 128/74    Gen: NAD  HEENT:  PERRL, throat clear  Neck: no adenopathy, no thyromegaly, no JVD   Heart:  Regular,Nl S1S2,  no murmur, gallop or rub. Lungs:  clear  Abdomen:   Soft, non-tender, bowel sounds are active.    Extremities:  No edema  Pulse: symmetric  Neuro: A&O times 3, No focal neuro deficits    Cardiographics    ECG: NSR, PVC's, non=specific widening, no acute changes    Labs:   Lab Results   Component Value Date/Time    Sodium 142 08/10/2018 03:02 AM    Sodium 142 08/09/2018 05:19 AM    Sodium 141 08/08/2018 02:01 AM    Sodium 139 08/07/2018 10:30 PM    Potassium 3.7 08/10/2018 03:02 AM    Potassium 3.8 08/09/2018 05:19 AM    Potassium 4.0 08/08/2018 02:01 AM    Potassium 3.5 08/07/2018 10:30 PM    Chloride 110 (H) 08/10/2018 03:02 AM    Chloride 111 (H) 08/09/2018 05:19 AM    Chloride 110 (H) 08/08/2018 02:01 AM    Chloride 103 08/07/2018 10:30 PM    CO2 25 08/10/2018 03:02 AM    CO2 25 08/09/2018 05:19 AM    CO2 23 08/08/2018 02:01 AM    CO2 24 08/07/2018 10:30 PM    Anion gap 7 08/10/2018 03:02 AM    Anion gap 6 08/09/2018 05:19 AM    Anion gap 8 08/08/2018 02:01 AM    Anion gap 12 08/07/2018 10:30 PM    Glucose 94 08/10/2018 03:02 AM    Glucose 94 08/09/2018 05:19 AM    Glucose 133 (H) 08/08/2018 02:01 AM    Glucose 206 (H) 08/07/2018 10:30 PM    BUN 19 08/10/2018 03:02 AM    BUN 17 08/09/2018 05:19 AM    BUN 16 08/08/2018 02:01 AM    BUN 16 08/07/2018 10:30 PM    Creatinine 0.85 08/10/2018 03:02 AM    Creatinine 0.88 08/09/2018 05:19 AM    Creatinine 0.81 08/08/2018 02:01 AM    Creatinine 1.08 08/07/2018 10:30 PM    BUN/Creatinine ratio 22 (H) 08/10/2018 03:02 AM    BUN/Creatinine ratio 19 08/09/2018 05:19 AM    BUN/Creatinine ratio 20 08/08/2018 02:01 AM    BUN/Creatinine ratio 15 08/07/2018 10:30 PM    GFR est AA >60 08/10/2018 03:02 AM    GFR est AA >60 08/09/2018 05:19 AM    GFR est AA >60 08/08/2018 02:01 AM    GFR est AA >60 08/07/2018 10:30 PM    GFR est non-AA >60 08/10/2018 03:02 AM    GFR est non-AA >60 08/09/2018 05:19 AM    GFR est non-AA >60 08/08/2018 02:01 AM    GFR est non-AA >60 08/07/2018 10:30 PM    Calcium 9.5 08/10/2018 03:02 AM    Calcium 9.4 08/09/2018 05:19 AM    Calcium 9.1 08/08/2018 02:01 AM    Calcium 10.3 (H) 08/07/2018 10:30 PM    Bilirubin, total 0.3 08/08/2018 02:01 AM    Bilirubin, total 0.3 08/07/2018 10:30 PM    Alk. phosphatase 76 08/08/2018 02:01 AM    Alk. phosphatase 94 08/07/2018 10:30 PM    Protein, total 6.2 (L) 08/08/2018 02:01 AM    Protein, total 7.8 08/07/2018 10:30 PM    Albumin 2.9 (L) 08/08/2018 02:01 AM    Albumin 3.7 08/07/2018 10:30 PM    Globulin 3.3 08/08/2018 02:01 AM    Globulin 4.1 (H) 08/07/2018 10:30 PM    A-G Ratio 0.9 (L) 08/08/2018 02:01 AM    A-G Ratio 0.9 (L) 08/07/2018 10:30 PM    ALT (SGPT) 28 08/08/2018 02:01 AM    ALT (SGPT) 23 08/07/2018 10:30 PM     No results found for: CPK, CPKX, CPX  Lab Results   Component Value Date/Time    Cholesterol, total 123 08/09/2018 05:19 AM    HDL Cholesterol 31 08/09/2018 05:19 AM    LDL, calculated 67.4 08/09/2018 05:19 AM    Triglyceride 123 08/09/2018 05:19 AM    CHOL/HDL Ratio 4.0 08/09/2018 05:19 AM     No results found for this or any previous visit. Assessment:         Patient Active Problem List    Diagnosis Date Noted    Dyslipidemia 11/30/2018    Coronary artery disease involving native coronary artery of native heart without angina pectoris 08/22/2018    STEMI involving right coronary artery ([facility] [address] 08/08/2018    Sciatica of left side 03/14/2014    Hypertension 03/14/2014     S/p STEMI of RCA 8/7/18--transferred to [facility], primary PCI/BARRIE to RCA.  Had transient VT and chris issues early on, doing well post hosp d/c.  LVEF 50%. On ASA,  statin, lisinopril, low dose beta blocker.  Brilinta stopped 8/19 one year post MI/PCI.  No current CV sx or complaints. Continues to see PCP.  BP has been running high--lisinopril increased last OV here and by PCP. Herman Mcnamara Plan:     Doing well with no adverse cardiac symptoms. Lipids and labs followed by PCP. Continue current care and f/u in 6 months.     Sandeep Dinero MD

## 2020-07-21 NOTE — PROGRESS NOTES
Verified patient with two patient identifiers. Medications reviewed/approved by Dr. Slim Thompson. 1. Have you been to the ER, urgent care clinic since your last visit? Hospitalized since your last visit?no    2. Have you seen or consulted any other health care providers outside of the [address] [place] [address] since your last visit? Include any pap smears or colon screening. Yes, 3/3/20 ISHA Sykes for medicare wellness.

## 2020-07-27 DIAGNOSIS — I25.10 CORONARY ARTERY DISEASE INVOLVING NATIVE CORONARY ARTERY OF NATIVE HEART WITHOUT ANGINA PECTORIS: ICD-10-CM

## 2020-07-27 DIAGNOSIS — I21.11 STEMI INVOLVING RIGHT CORONARY ARTERY (HCC): ICD-10-CM

## 2020-07-27 DIAGNOSIS — I10 HYPERTENSION, UNSPECIFIED TYPE: ICD-10-CM

## 2020-07-28 RX ORDER — NITROGLYCERIN 0.4 MG/1
TABLET SUBLINGUAL
Qty: 25 TAB | Refills: 1 | Status: SHIPPED | OUTPATIENT
Start: 2020-07-28 | End: 2022-01-27

## 2021-01-06 RX ORDER — LISINOPRIL 20 MG/1
TABLET ORAL
Qty: 90 TAB | Refills: 0 | Status: SHIPPED | OUTPATIENT
Start: 2021-01-06 | End: 2022-06-30 | Stop reason: ALTCHOICE

## 2021-01-26 ENCOUNTER — OFFICE VISIT (OUTPATIENT)
Dept: CARDIOLOGY CLINIC | Age: 81
End: 2021-01-26
Payer: MEDICARE

## 2021-01-26 VITALS
BODY MASS INDEX: 24.14 KG/M2 | HEIGHT: 69 IN | WEIGHT: 163 LBS | DIASTOLIC BLOOD PRESSURE: 82 MMHG | SYSTOLIC BLOOD PRESSURE: 140 MMHG | OXYGEN SATURATION: 97 % | RESPIRATION RATE: 16 BRPM | HEART RATE: 53 BPM | TEMPERATURE: 98.2 F

## 2021-01-26 DIAGNOSIS — I21.11 STEMI INVOLVING RIGHT CORONARY ARTERY (HCC): ICD-10-CM

## 2021-01-26 DIAGNOSIS — I25.10 CORONARY ARTERY DISEASE INVOLVING NATIVE CORONARY ARTERY OF NATIVE HEART WITHOUT ANGINA PECTORIS: Primary | ICD-10-CM

## 2021-01-26 DIAGNOSIS — E78.5 DYSLIPIDEMIA: ICD-10-CM

## 2021-01-26 DIAGNOSIS — I49.3 PVC'S (PREMATURE VENTRICULAR CONTRACTIONS): ICD-10-CM

## 2021-01-26 DIAGNOSIS — R00.1 BRADYCARDIA: ICD-10-CM

## 2021-01-26 DIAGNOSIS — Z98.61 S/P PTCA (PERCUTANEOUS TRANSLUMINAL CORONARY ANGIOPLASTY): ICD-10-CM

## 2021-01-26 DIAGNOSIS — I10 ESSENTIAL HYPERTENSION: ICD-10-CM

## 2021-01-26 PROCEDURE — G8536 NO DOC ELDER MAL SCRN: HCPCS | Performed by: INTERNAL MEDICINE

## 2021-01-26 PROCEDURE — G8420 CALC BMI NORM PARAMETERS: HCPCS | Performed by: INTERNAL MEDICINE

## 2021-01-26 PROCEDURE — G8753 SYS BP > OR = 140: HCPCS | Performed by: INTERNAL MEDICINE

## 2021-01-26 PROCEDURE — G8427 DOCREV CUR MEDS BY ELIG CLIN: HCPCS | Performed by: INTERNAL MEDICINE

## 2021-01-26 PROCEDURE — 1101F PT FALLS ASSESS-DOCD LE1/YR: CPT | Performed by: INTERNAL MEDICINE

## 2021-01-26 PROCEDURE — 93000 ELECTROCARDIOGRAM COMPLETE: CPT | Performed by: INTERNAL MEDICINE

## 2021-01-26 PROCEDURE — G8754 DIAS BP LESS 90: HCPCS | Performed by: INTERNAL MEDICINE

## 2021-01-26 PROCEDURE — G8510 SCR DEP NEG, NO PLAN REQD: HCPCS | Performed by: INTERNAL MEDICINE

## 2021-01-26 PROCEDURE — 99214 OFFICE O/P EST MOD 30 MIN: CPT | Performed by: INTERNAL MEDICINE

## 2021-01-26 RX ORDER — ASPIRIN 81 MG/1
TABLET ORAL
Qty: 90 TAB | Refills: 3 | Status: SHIPPED | OUTPATIENT
Start: 2021-01-26 | End: 2022-01-19

## 2021-01-26 RX ORDER — ATORVASTATIN CALCIUM 40 MG/1
TABLET, FILM COATED ORAL
Qty: 90 TAB | Refills: 3 | Status: SHIPPED | OUTPATIENT
Start: 2021-01-26

## 2021-01-26 NOTE — PROGRESS NOTES
Chief Complaint   Patient presents with    Coronary Artery Disease     6 mth follow up    Hypertension    Cholesterol Problem     Verified patient with two patient identifiers. Medications reviewed/approved by Dr. Oneil Johnson. 1. Have you been to the ER, urgent care clinic since your last visit? Hospitalized since your last visit?no    2. Have you seen or consulted any other health care providers outside of the [address] [place] [address] since your last visit? Include any pap smears or colon screening.  no

## 2021-01-26 NOTE — LETTER
1/26/2021 Patient: Grace Ashraf YOB: 1940 Date of Visit: 1/26/2021 Muna Akins NP 
[address] [facility] [address] Via Fax: [phone] Dear Muna Akins NP, Thank you for referring Mr. Sariah Lea to [facility] for evaluation. My notes for this consultation are attached. If you have questions, please do not hesitate to call me. I look forward to following your patient along with you.  
 
 
Sincerely, 
 
Franky Lau MD

## 2021-01-26 NOTE — PROGRESS NOTES
Sheila Wyatt is a [de-identified] y.o. male is here for routine f/u. S/p STEMI of RCA 18--transferred to [address] [place] [address], primary PCI/BARRIE to RCA.  Had transient VT and chris issues early on, doing well post hosp d/c.  LVEF 50%. On ASA,  statin, lisinopril, low dose beta blocker. Brilinta stopped  one year post MI/PCI.  No current CV sx or complaints. Continues to see PCP The patient denies chest pain/ shortness of breath, orthopnea, PND, LE edema, palpitations, syncope, presyncope or fatigue. Patient Active Problem List    Diagnosis Date Noted    Dyslipidemia 2018    Coronary artery disease involving native coronary artery of native heart without angina pectoris 2018    STEMI involving right coronary artery ([facility] [address] 2018    Sciatica of left side 2014    Hypertension 2014      López Sykes Che, NP  Past Medical History:   Diagnosis Date    CAD (coronary artery disease)     Hypercholesterolemia     Hypertension     Sciatica     STEMI (ST elevation myocardial infarction) ([facility] [address] 2018    RCA      Past Surgical History:   Procedure Laterality Date    HX PTCA  2018    PTCA/BARRIE RCA (STEMI)     No Known Allergies   History reviewed. No pertinent family history.    Social History     Socioeconomic History    Marital status:      Spouse name: Not on file    Number of children: Not on file    Years of education: Not on file    Highest education level: Not on file   Occupational History    Not on file   Social Needs    Financial resource strain: Not on file    Food insecurity     Worry: Not on file     Inability: Not on file    Transportation needs     Medical: Not on file     Non-medical: Not on file   Tobacco Use    Smoking status: Former Smoker     Packs/day: 3.00     Years: 20.00     Pack years: 60.00     Types: Cigarettes     Quit date: 1992     Years since quittin.4    Smokeless tobacco: Never Used   Substance and Sexual Activity    Alcohol use: No    Drug use: Not on file    Sexual activity: Not on file   Lifestyle    Physical activity     Days per week: Not on file     Minutes per session: Not on file    Stress: Not on file   Relationships    Social connections     Talks on phone: Not on file     Gets together: Not on file     Attends Congregational service: Not on file     Active member of club or organization: Not on file     Attends meetings of clubs or organizations: Not on file     Relationship status: Not on file    Intimate partner violence     Fear of current or ex partner: Not on file     Emotionally abused: Not on file     Physically abused: Not on file     Forced sexual activity: Not on file   Other Topics Concern    Not on file   Social History Narrative    Not on file      Current Outpatient Medications   Medication Sig    lisinopriL (PRINIVIL, ZESTRIL) 20 mg tablet TAKE 1 TABLET BY MOUTH EVERY DAY    aspirin delayed-release 81 mg tablet TAKE 1 TABLET BY MOUTH EVERY DAY    atorvastatin (LIPITOR) 40 mg tablet TAKE 1 TABLET BY MOUTH EVERY EVENING    nitroglycerin (NITROSTAT) 0.4 mg SL tablet PLACE 1 TABLET UNDER TONGUE EVERY 5 MINUTES AS NEEDED FOR CHEST PAIN     No current facility-administered medications for this visit. Review of Symptoms:    CONST  No weight change. No fever, chills, sweats    ENT No visual changes, URI sx, sore throat    CV  See HPI   RESP  No cough, or sputum, wheezing. Also see HPI   GI  No abdominal pain or change in bowel habits. No heartburn or dysphagia. No melena or rectal bleeding.   No dysuria, urgency, frequency, hematuria   MSKEL  No joint pain, swelling. No muscle pain. SKIN  No rash or lesions. NEURO  No headache, syncope, or seizure. No weakness, loss of sensation, or paresthesias. PSYCH  No low mood or depression  No anxiety. HE/LYMPH  No easy bruising, abnormal bleeding, or enlarged glands.         Physical ExamPhysical Exam:    Visit Vitals  BP (!) 140/82 (BP 1 Location: Left arm, BP Patient Position: Sitting)   Pulse (!) 53   Temp 98.2 °F (36.8 °C) (Temporal)   Resp 16   Ht 5' 9\" (1.753 m)   Wt 163 lb (73.9 kg)   SpO2 97%   BMI 24.07 kg/m²     Gen: NAD  HEENT:  PERRL, throat clear  Neck: no adenopathy, no thyromegaly, no JVD   Heart:  Regular,Nl S1S2,  no murmur, gallop or rub. Lungs:  clear  Abdomen:   Soft, non-tender, bowel sounds are active.    Extremities:  No edema  Pulse: symmetric  Neuro: A&O times 3, No focal neuro deficits    Cardiographics    ECG: normal EKG, normal sinus rhythm, unchanged from previous tracings    Labs:   Lab Results   Component Value Date/Time    Sodium 142 08/10/2018 03:02 AM    Sodium 142 08/09/2018 05:19 AM    Sodium 141 08/08/2018 02:01 AM    Sodium 139 08/07/2018 10:30 PM    Potassium 3.7 08/10/2018 03:02 AM    Potassium 3.8 08/09/2018 05:19 AM    Potassium 4.0 08/08/2018 02:01 AM    Potassium 3.5 08/07/2018 10:30 PM    Chloride 110 (H) 08/10/2018 03:02 AM    Chloride 111 (H) 08/09/2018 05:19 AM    Chloride 110 (H) 08/08/2018 02:01 AM    Chloride 103 08/07/2018 10:30 PM    CO2 25 08/10/2018 03:02 AM    CO2 25 08/09/2018 05:19 AM    CO2 23 08/08/2018 02:01 AM    CO2 24 08/07/2018 10:30 PM    Anion gap 7 08/10/2018 03:02 AM    Anion gap 6 08/09/2018 05:19 AM    Anion gap 8 08/08/2018 02:01 AM    Anion gap 12 08/07/2018 10:30 PM    Glucose 94 08/10/2018 03:02 AM    Glucose 94 08/09/2018 05:19 AM    Glucose 133 (H) 08/08/2018 02:01 AM    Glucose 206 (H) 08/07/2018 10:30 PM    BUN 19 08/10/2018 03:02 AM    BUN 17 08/09/2018 05:19 AM    BUN 16 08/08/2018 02:01 AM    BUN 16 08/07/2018 10:30 PM    Creatinine 0.85 08/10/2018 03:02 AM    Creatinine 0.88 08/09/2018 05:19 AM    Creatinine 0.81 08/08/2018 02:01 AM    Creatinine 1.08 08/07/2018 10:30 PM    BUN/Creatinine ratio 22 (H) 08/10/2018 03:02 AM    BUN/Creatinine ratio 19 08/09/2018 05:19 AM    BUN/Creatinine ratio 20 08/08/2018 02:01 AM    BUN/Creatinine ratio 15 08/07/2018 10:30 PM    GFR est AA >60 08/10/2018 03:02 AM    GFR est AA >60 08/09/2018 05:19 AM    GFR est AA >60 08/08/2018 02:01 AM    GFR est AA >60 08/07/2018 10:30 PM    GFR est non-AA >60 08/10/2018 03:02 AM    GFR est non-AA >60 08/09/2018 05:19 AM    GFR est non-AA >60 08/08/2018 02:01 AM    GFR est non-AA >60 08/07/2018 10:30 PM    Calcium 9.5 08/10/2018 03:02 AM    Calcium 9.4 08/09/2018 05:19 AM    Calcium 9.1 08/08/2018 02:01 AM    Calcium 10.3 (H) 08/07/2018 10:30 PM    Bilirubin, total 0.3 08/08/2018 02:01 AM    Bilirubin, total 0.3 08/07/2018 10:30 PM    Alk. phosphatase 76 08/08/2018 02:01 AM    Alk. phosphatase 94 08/07/2018 10:30 PM    Protein, total 6.2 (L) 08/08/2018 02:01 AM    Protein, total 7.8 08/07/2018 10:30 PM    Albumin 2.9 (L) 08/08/2018 02:01 AM    Albumin 3.7 08/07/2018 10:30 PM    Globulin 3.3 08/08/2018 02:01 AM    Globulin 4.1 (H) 08/07/2018 10:30 PM    A-G Ratio 0.9 (L) 08/08/2018 02:01 AM    A-G Ratio 0.9 (L) 08/07/2018 10:30 PM    ALT (SGPT) 28 08/08/2018 02:01 AM    ALT (SGPT) 23 08/07/2018 10:30 PM     No results found for: CPK, CPKX, CPX  Lab Results   Component Value Date/Time    Cholesterol, total 123 08/09/2018 05:19 AM    HDL Cholesterol 31 08/09/2018 05:19 AM    LDL, calculated 67.4 08/09/2018 05:19 AM    Triglyceride 123 08/09/2018 05:19 AM    CHOL/HDL Ratio 4.0 08/09/2018 05:19 AM     No results found for this or any previous visit. Assessment:         Patient Active Problem List    Diagnosis Date Noted    Dyslipidemia 11/30/2018    Coronary artery disease involving native coronary artery of native heart without angina pectoris 08/22/2018    STEMI involving right coronary artery ([facility] [address] 08/08/2018    Sciatica of left side 03/14/2014    Hypertension 03/14/2014      S/p STEMI of RCA 8/7/18--transferred to [facility], primary PCI/BARRIE to RCA.  Had transient VT and chris issues early on, doing well post hosp d/c.  LVEF 50%.  On ASA,  statin, lisinopril, low dose beta blocker. Brilinta stopped 8/19 one year post MI/PCI.  No current CV sx or complaints. Continues to see PCP     Plan:     Doing well with no adverse cardiac symptoms--no sx of angina/CHF  On appropriate meds incl ACEI, intensive statin, ASA, sl NTG prn  Due for  Lipids and labs-- followed by PCP. Continue current care and f/u in 6 months.     Shital Zee MD

## 2021-07-27 ENCOUNTER — OFFICE VISIT (OUTPATIENT)
Dept: CARDIOLOGY CLINIC | Age: 81
End: 2021-07-27
Payer: MEDICARE

## 2021-07-27 VITALS
HEART RATE: 71 BPM | HEIGHT: 69 IN | TEMPERATURE: 96.3 F | DIASTOLIC BLOOD PRESSURE: 72 MMHG | RESPIRATION RATE: 16 BRPM | BODY MASS INDEX: 23.4 KG/M2 | WEIGHT: 158 LBS | SYSTOLIC BLOOD PRESSURE: 136 MMHG | OXYGEN SATURATION: 97 %

## 2021-07-27 DIAGNOSIS — E78.5 DYSLIPIDEMIA: ICD-10-CM

## 2021-07-27 DIAGNOSIS — I10 ESSENTIAL HYPERTENSION: ICD-10-CM

## 2021-07-27 DIAGNOSIS — I21.11 STEMI INVOLVING RIGHT CORONARY ARTERY (HCC): ICD-10-CM

## 2021-07-27 DIAGNOSIS — I49.3 PVC'S (PREMATURE VENTRICULAR CONTRACTIONS): ICD-10-CM

## 2021-07-27 DIAGNOSIS — I25.10 CORONARY ARTERY DISEASE INVOLVING NATIVE CORONARY ARTERY OF NATIVE HEART WITHOUT ANGINA PECTORIS: Primary | ICD-10-CM

## 2021-07-27 DIAGNOSIS — R00.1 BRADYCARDIA: ICD-10-CM

## 2021-07-27 DIAGNOSIS — Z98.61 S/P PTCA (PERCUTANEOUS TRANSLUMINAL CORONARY ANGIOPLASTY): ICD-10-CM

## 2021-07-27 PROCEDURE — G8754 DIAS BP LESS 90: HCPCS | Performed by: INTERNAL MEDICINE

## 2021-07-27 PROCEDURE — G8510 SCR DEP NEG, NO PLAN REQD: HCPCS | Performed by: INTERNAL MEDICINE

## 2021-07-27 PROCEDURE — G8536 NO DOC ELDER MAL SCRN: HCPCS | Performed by: INTERNAL MEDICINE

## 2021-07-27 PROCEDURE — 93000 ELECTROCARDIOGRAM COMPLETE: CPT | Performed by: INTERNAL MEDICINE

## 2021-07-27 PROCEDURE — 99214 OFFICE O/P EST MOD 30 MIN: CPT | Performed by: INTERNAL MEDICINE

## 2021-07-27 PROCEDURE — G8752 SYS BP LESS 140: HCPCS | Performed by: INTERNAL MEDICINE

## 2021-07-27 PROCEDURE — G8420 CALC BMI NORM PARAMETERS: HCPCS | Performed by: INTERNAL MEDICINE

## 2021-07-27 PROCEDURE — 1101F PT FALLS ASSESS-DOCD LE1/YR: CPT | Performed by: INTERNAL MEDICINE

## 2021-07-27 PROCEDURE — G8427 DOCREV CUR MEDS BY ELIG CLIN: HCPCS | Performed by: INTERNAL MEDICINE

## 2021-07-27 RX ORDER — ERGOCALCIFEROL 1.25 MG/1
50000 CAPSULE ORAL
COMMUNITY

## 2021-07-27 NOTE — PROGRESS NOTES
Daryle Durie is a [de-identified] y.o. male is here for routine f/u. S/p STEMI of RCA 18--transferred to [address] [place] [address], primary PCI/BARRIE to RCA.  Had transient VT and chris issues early on, doing well post hosp d/c.  LVEF 50%. On ASA,  statin, lisinopril, low dose beta blocker. Brilinta stopped  one year post MI/PCI.  No current CV sx or complaints. Continues to see PCP  The patient denies chest pain/ shortness of breath, orthopnea, PND, LE edema, palpitations, syncope, presyncope or fatigue. Patient Active Problem List    Diagnosis Date Noted    Dyslipidemia 2018    Coronary artery disease involving native coronary artery of native heart without angina pectoris 2018    STEMI involving right coronary artery ([facility] [address] 2018    Sciatica of left side 2014    Hypertension 2014      Gilda Sykes NP  Past Medical History:   Diagnosis Date    CAD (coronary artery disease)     Hypercholesterolemia     Hypertension     Sciatica     STEMI (ST elevation myocardial infarction) ([facility] [address] 2018    RCA      Past Surgical History:   Procedure Laterality Date    HX PTCA  2018    PTCA/BARRIE RCA (STEMI)     No Known Allergies   History reviewed. No pertinent family history.    Social History     Socioeconomic History    Marital status:      Spouse name: Not on file    Number of children: Not on file    Years of education: Not on file    Highest education level: Not on file   Occupational History    Not on file   Tobacco Use    Smoking status: Former Smoker     Packs/day: 3.00     Years: 20.00     Pack years: 60.00     Types: Cigarettes     Quit date: 1992     Years since quittin.9    Smokeless tobacco: Never Used   Substance and Sexual Activity    Alcohol use: No    Drug use: Not on file    Sexual activity: Not on file   Other Topics Concern    Not on file   Social History Narrative    Not on file     Social Determinants of Health     Financial Resource Strain:     Difficulty of Paying Living Expenses:    Food Insecurity:     Worried About Running Out of Food in the Last Year:     [address] in the Last Year:    Transportation Needs:     Lack of Transportation (Medical):  Lack of Transportation (Non-Medical):    Physical Activity:     Days of Exercise per Week:     Minutes of Exercise per Session:    Stress:     Feeling of Stress :    Social Connections:     Frequency of Communication with Friends and Family:     Frequency of Social Gatherings with Friends and Family:     Attends [employer] Services:     Active Member of Clubs or Organizations:     Attends Club or Organization Meetings:     Marital Status:    Intimate Partner Violence:     Fear of Current or Ex-Partner:     Emotionally Abused:     Physically Abused:     Sexually Abused:       Current Outpatient Medications   Medication Sig    zinc acetate 50 mg (zinc) cap Take 1 Capsule by mouth daily.  cholecalciferol, vitamin D3, (VITAMIN D3 PO) Take 2 Tablets by mouth daily.  aspirin delayed-release 81 mg tablet TAKE 1 TABLET BY MOUTH EVERY DAY    atorvastatin (LIPITOR) 40 mg tablet TAKE 1 TABLET BY MOUTH EVERY EVENING    lisinopriL (PRINIVIL, ZESTRIL) 20 mg tablet TAKE 1 TABLET BY MOUTH EVERY DAY    nitroglycerin (NITROSTAT) 0.4 mg SL tablet PLACE 1 TABLET UNDER TONGUE EVERY 5 MINUTES AS NEEDED FOR CHEST PAIN    ergocalciferol (ERGOCALCIFEROL) 1,250 mcg (50,000 unit) capsule Take 50,000 Units by mouth. (Patient not taking: Reported on 7/27/2021)     No current facility-administered medications for this visit. Review of Symptoms:    CONST  No weight change. No fever, chills, sweats    ENT No visual changes, URI sx, sore throat    CV  See HPI   RESP  No cough, or sputum, wheezing. Also see HPI   GI  No abdominal pain or change in bowel habits. No heartburn or dysphagia. No melena or rectal bleeding.       No dysuria, urgency, frequency, hematuria   MSKEL  No joint pain, swelling. No muscle pain. SKIN  No rash or lesions. NEURO  No headache, syncope, or seizure. No weakness, loss of sensation, or paresthesias. PSYCH  No low mood or depression  No anxiety. HE/LYMPH  No easy bruising, abnormal bleeding, or enlarged glands. Physical ExamPhysical Exam:    Visit Vitals  /72 (BP 1 Location: Left arm, BP Patient Position: Sitting)   Pulse 71   Temp (!) 96.3 °F (35.7 °C) (Temporal)   Resp 16   Ht 5' 9\" (1.753 m)   Wt 158 lb (71.7 kg)   SpO2 97%   BMI 23.33 kg/m²     Gen: NAD  HEENT:  PERRL, throat clear  Neck: no adenopathy, no thyromegaly, no JVD   Heart:  Regular,Nl S1S2,  no murmur, gallop or rub. Lungs:  clear  Abdomen:   Soft, non-tender, bowel sounds are active. Extremities:  No edema  Pulse: symmetric  Neuro: A&O times 3, No focal neuro deficits    Cardiographics    ECG: normal EKG, normal sinus rhythm, unchanged from previous tracings      Assessment:         Patient Active Problem List    Diagnosis Date Noted    Dyslipidemia 11/30/2018    Coronary artery disease involving native coronary artery of native heart without angina pectoris 08/22/2018    STEMI involving right coronary artery ([facility] [address] 08/08/2018    Sciatica of left side 03/14/2014    Hypertension 03/14/2014      S/p STEMI of RCA 8/7/18--transferred to [facility], primary PCI/BARRIE to RCA.  Had transient VT and chris issues early on, doing well post hosp d/c.  LVEF 50%. On ASA,  statin, lisinopril, low dose beta blocker. Brilinta stopped 8/19 one year post MI/PCI.  No current CV sx or complaints. Continues to see PCP     Plan:     Doing well with no adverse cardiac symptoms. Due for lipids/labs--followed by PCP. Continue current care and f/u in 6 months.     Anup Brothers MD

## 2021-07-27 NOTE — LETTER
7/27/2021    Patient: Donaldo Chatman   YOB: 1940   Date of Visit: 7/27/2021     Alena Quinones NP  Nia Venegas  Via Fax: [phone]    Dear Alena Quinones NP,      Thank you for referring Mr. Nidia Gosselin to [facility] for evaluation. My notes for this consultation are attached. If you have questions, please do not hesitate to call me. I look forward to following your patient along with you.       Sincerely,    Donal Zhong MD

## 2021-07-27 NOTE — PROGRESS NOTES
Identified pt with two pt identifiers(name and ). Reviewed record in preparation for visit and have obtained necessary documentation. Chief Complaint   Patient presents with    Follow-up     6 month      Vitals:    21 1309   BP: 136/72   Pulse: 71   Resp: 16   Temp: (!) 96.3 °F (35.7 °C)   TempSrc: Temporal   SpO2: 97%   Weight: 158 lb (71.7 kg)   Height: 5' 9\" (1.753 m)   PainSc:   0 - No pain       Health Maintenance Review: Patient reminded of \"due or due soon\" health maintenance. I have asked the patient to contact his/her primary care provider (PCP) for follow-up on his/her health maintenance. Coordination of Care Questionnaire:  :   1) Have you been to an emergency room, urgent care, or hospitalized since your last visit? If yes, where when, and reason for visit? no       2. Have seen or consulted any other health care provider since your last visit? If yes, where when, and reason for visit? NO      Patient is accompanied by self I have received verbal consent from Peg Hernandez to discuss any/all medical information while they are present in the room.

## 2021-11-29 ENCOUNTER — HOSPITAL ENCOUNTER (EMERGENCY)
Age: 81
Discharge: HOME | End: 2021-11-29
Attending: EMERGENCY MEDICINE
Payer: MEDICARE

## 2021-11-29 VITALS
RESPIRATION RATE: 18 BRPM | BODY MASS INDEX: 22.22 KG/M2 | OXYGEN SATURATION: 96 % | HEART RATE: 79 BPM | HEIGHT: 69 IN | WEIGHT: 150 LBS | SYSTOLIC BLOOD PRESSURE: 135 MMHG | TEMPERATURE: 97.7 F | DIASTOLIC BLOOD PRESSURE: 78 MMHG

## 2021-11-29 DIAGNOSIS — M54.32 SCIATICA OF LEFT SIDE: Primary | ICD-10-CM

## 2021-11-29 PROCEDURE — 99283 EMERGENCY DEPT VISIT LOW MDM: CPT

## 2021-11-29 PROCEDURE — 74011250637 HC RX REV CODE- 250/637: Performed by: EMERGENCY MEDICINE

## 2021-11-29 RX ORDER — OXYCODONE HYDROCHLORIDE 5 MG/1
5 TABLET ORAL
Status: COMPLETED | OUTPATIENT
Start: 2021-11-29 | End: 2021-11-29

## 2021-11-29 RX ORDER — PREDNISONE 20 MG/1
20 TABLET ORAL DAILY
Qty: 7 TABLET | Refills: 0 | Status: SHIPPED | OUTPATIENT
Start: 2021-11-29 | End: 2021-12-06

## 2021-11-29 RX ORDER — CYCLOBENZAPRINE HCL 10 MG
10 TABLET ORAL
Qty: 12 TABLET | Refills: 0 | Status: SHIPPED | OUTPATIENT
Start: 2021-11-29

## 2021-11-29 RX ADMIN — OXYCODONE 5 MG: 5 TABLET ORAL at 13:01

## 2021-11-29 NOTE — ED PROVIDER NOTES
EMERGENCY DEPARTMENT HISTORY AND PHYSICAL EXAM          Date: 11/29/2021  Patient Name: Gianna Dean    History of Presenting Illness     Chief Complaint   Patient presents with    Back Pain       History Provided By: Patient    HPI: Gianna Dean is a [de-identified] y.o. male, pmhx hypertension, high cholesterol, CAD status post stenting, sciatica, who presents via private auto to the ED c/o back pain    Patient explains he started yesterday with some low back pain which kept him up most of the night from sleeping. He tried to go to work today but he just could not tolerate the pain so he went home and when he tried to go up the steps to get into the house his leg just gave out so he decided to come to the ER for evaluation. He notes his family drove him here. Patient states he had pain similar to this 3 to 4 years ago which was fixed by physical therapy. He took 2 Aleve for the pain yesterday but has not taken anything for his symptoms today and currently rates it at 9/10 radiating down his left leg. Patient denies any associated numbness around the perineum, lower extremities, foot weakness, difficulty urinating and urinary incontinence. He also denies any recent injury at work or outside trauma. PCP: Chava Kim NP    Allergies: None known    There are no other complaints, changes, or physical findings at this time. Current Outpatient Medications   Medication Sig Dispense Refill    predniSONE (DELTASONE) 20 mg tablet Take 20 mg by mouth daily for 7 days. With Breakfast 7 Tablet 0    cyclobenzaprine (FLEXERIL) 10 mg tablet Take 1 Tablet by mouth three (3) times daily as needed for Muscle Spasm(s). 12 Tablet 0    ergocalciferol (ERGOCALCIFEROL) 1,250 mcg (50,000 unit) capsule Take 50,000 Units by mouth. (Patient not taking: Reported on 7/27/2021)      zinc acetate 50 mg (zinc) cap Take 1 Capsule by mouth daily.  cholecalciferol, vitamin D3, (VITAMIN D3 PO) Take 2 Tablets by mouth daily.  aspirin delayed-release 81 mg tablet TAKE 1 TABLET BY MOUTH EVERY DAY 90 Tab 3    atorvastatin (LIPITOR) 40 mg tablet TAKE 1 TABLET BY MOUTH EVERY EVENING 90 Tab 3    lisinopriL (PRINIVIL, ZESTRIL) 20 mg tablet TAKE 1 TABLET BY MOUTH EVERY DAY 90 Tab 0    nitroglycerin (NITROSTAT) 0.4 mg SL tablet PLACE 1 TABLET UNDER TONGUE EVERY 5 MINUTES AS NEEDED FOR CHEST PAIN 25 Tab 1       Past History     Past Medical History:  Past Medical History:   Diagnosis Date    CAD (coronary artery disease)     Hypercholesterolemia     Hypertension     Sciatica     STEMI (ST elevation myocardial infarction) ([facility] [address] 2018    RCA       Past Surgical History:  Past Surgical History:   Procedure Laterality Date    HX PTCA  2018    PTCA/BARRIE RCA (STEMI)       Family History:  History reviewed. No pertinent family history. Social History:  Social History     Tobacco Use    Smoking status: Former Smoker     Packs/day: 3.00     Years: 20.00     Pack years: 60.00     Types: Cigarettes     Quit date: 1992     Years since quittin.2    Smokeless tobacco: Never Used   Substance Use Topics    Alcohol use: No    Drug use: Not on file       Allergies:  No Known Allergies      Review of Systems   Review of Systems   Constitutional: Negative for activity change, appetite change, chills, fever and unexpected weight change. HENT: Negative for congestion. Eyes: Negative for pain and visual disturbance. Respiratory: Negative for cough and shortness of breath. Cardiovascular: Negative for chest pain. Gastrointestinal: Negative for abdominal pain, diarrhea, nausea and vomiting. Genitourinary: Negative for decreased urine volume, difficulty urinating and dysuria. Musculoskeletal: Positive for back pain. Negative for gait problem and joint swelling. Skin: Negative for rash. Neurological: Negative for tremors, numbness and headaches. Physical Exam   Physical Exam  Vitals and nursing note reviewed. Constitutional:       Appearance: He is well-developed. He is not diaphoretic. Comments: Elderly male who appears uncomfortable sitting in hallway chair with normal vital signs in mild acute distress secondary to pain   HENT:      Head: Normocephalic and atraumatic. Eyes:      General:         Right eye: No discharge. Left eye: No discharge. Conjunctiva/sclera: Conjunctivae normal.      Pupils: Pupils are equal, round, and reactive to light. Cardiovascular:      Rate and Rhythm: Normal rate and regular rhythm. Heart sounds: Normal heart sounds. No murmur heard. Pulmonary:      Effort: Pulmonary effort is normal. No respiratory distress. Breath sounds: Normal breath sounds. No wheezing or rales. Abdominal:      General: Bowel sounds are normal. There is no distension. Palpations: Abdomen is soft. Tenderness: There is no abdominal tenderness. Musculoskeletal:         General: Normal range of motion. Cervical back: Normal range of motion and neck supple. Right lower leg: No edema. Left lower leg: No edema. Skin:     General: Skin is warm and dry. Coloration: Skin is not pale. Findings: No erythema or rash. Neurological:      Mental Status: He is alert and oriented to person, place, and time. Cranial Nerves: No cranial nerve deficit. Sensory: No sensory deficit. Motor: No weakness or abnormal muscle tone. Comments: Bilateral motor strength 5 out of 5 on dorsiflexion and plantar flexion       Diagnostic Study Results     Labs -   No results found for this or any previous visit (from the past 12 hour(s)). Radiologic Studies -   No orders to display     CT Results  (Last 48 hours)    None        CXR Results  (Last 48 hours)    None            Medical Decision Making   I am the first provider for this patient.     I reviewed the vital signs, available nursing notes, past medical history, past surgical history, family history and social history. Vital Signs-Reviewed the patient's vital signs. Patient Vitals for the past 12 hrs:   Temp Pulse Resp BP SpO2   11/29/21 1230 97.7 °F (36.5 °C) 79 18 135/78 96 %       Pulse Oximetry Analysis - 96% on RA    Records Reviewed: Nursing Notes, Old Medical Records, Previous Radiology Studies and Previous Laboratory Studies    Provider Notes (Medical Decision Making):   MDM: Elderly male presenting with left low back pain radiating to his left thigh causing him a sensation of his leg giving out today while trying to walk up the stairs; likely secondary to pain. On exam there is no evidence of motor or sensory weakness he has no symptoms of central cord compression. Given no recent trauma or injuries at work do not feel repeat x-ray is warranted at this time as he has had them previously for similar symptoms. Will initiate treatment for sciatica with steroids at home and follow-up in primary care for physical therapy referral.    ED Course:   Initial assessment performed. The patients presenting problems have been discussed, and they are in agreement with the care plan formulated and outlined with them. I have encouraged them to ask questions as they arise throughout their visit. PROGRESS NOTE:  1:15 PM  Pt appears to be sitting comfortable. Discussed the plan of outpatient medications and again reviewed follow-up. Strict return precautions reviewed with patient for symptoms of cord compression. Patient is appropriate for discharge at this time. Critical Care Time:   0      Diagnosis     Clinical Impression:   1. Sciatica of left side        PLAN:  1. Discharge Medication List as of 11/29/2021 12:59 PM      START taking these medications    Details   predniSONE (DELTASONE) 20 mg tablet Take 20 mg by mouth daily for 7 days.  With Breakfast, Normal, Disp-7 Tablet, R-0      cyclobenzaprine (FLEXERIL) 10 mg tablet Take 1 Tablet by mouth three (3) times daily as needed for Muscle Spasm(s). , Normal, Disp-12 Tablet, R-0         CONTINUE these medications which have NOT CHANGED    Details   ergocalciferol (ERGOCALCIFEROL) 1,250 mcg (50,000 unit) capsule Take 50,000 Units by mouth., Historical Med      zinc acetate 50 mg (zinc) cap Take 1 Capsule by mouth daily. , Historical Med      cholecalciferol, vitamin D3, (VITAMIN D3 PO) Take 2 Tablets by mouth daily. , Historical Med      aspirin delayed-release 81 mg tablet TAKE 1 TABLET BY MOUTH EVERY DAY, Normal, Disp-90 Tab, R-3      atorvastatin (LIPITOR) 40 mg tablet TAKE 1 TABLET BY MOUTH EVERY EVENING, Normal, Disp-90 Tab, R-3      lisinopriL (PRINIVIL, ZESTRIL) 20 mg tablet TAKE 1 TABLET BY MOUTH EVERY DAY, Normal, Disp-90 Tab, R-0      nitroglycerin (NITROSTAT) 0.4 mg SL tablet PLACE 1 TABLET UNDER TONGUE EVERY 5 MINUTES AS NEEDED FOR CHEST PAIN, Normal, Disp-25 Tab, R-1           2. Follow-up Information     Follow up With Specialties Details Why Contact Shanna Llanos NP Nurse Practitioner Schedule an appointment as soon as possible for a visit  for recheck in 2-3 days [address]  [phone]          Return to ED if worse     Disposition:  Home       Please note, this dictation was completed with [employer], the i[employer] voice recognition software. Quite often unanticipated grammatical, syntax, homophones, and other interpretive errors are inadvertently transcribed by the computer software. Please disregard these errors. Please excuse any errors that have escaped final proof reading.

## 2021-11-29 NOTE — Clinical Note
[address] [place] [address] EMERGENCY DEP  [address] [place] [address] Dr Ly Ordoñez [address]  [phone]    Work/School Note    Date: 11/29/2021    To Whom It May concern:    Sheila Wyatt was seen and treated today in the emergency room by the following provider(s):  Attending Provider: Yecenia Levi MD.      Sheila Wyatt is excused from work/school on 11/29/21 and 11/30/21. He is medically clear to return to work/school on 12/1/2021. Sincerely,          Jaron Mabry.  MD Warner

## 2021-11-29 NOTE — ED TRIAGE NOTES
Pt arrived by POV with complaint of back pain with radiation to legs. Pt reports he is having lower back pain and today when he was going up the steps his legs gve out on him. Pt is awake alert and oriented x 4. Pt denies loss of bowel or bladder.   Pt educated on ER flow

## 2021-11-29 NOTE — ED NOTES
Patient discharge by Dr. Alonso Olmos MD - pt sent to the front lobby, with strong and steady gait -  Discharge information / home RX / and reasons to return to the ED were reviewed by the doctor. Patient demonstrates understanding of discharge instructions. Patients daughter present and will be transporting patient home.

## 2021-12-03 ENCOUNTER — HOSPITAL ENCOUNTER (OUTPATIENT)
Dept: GENERAL RADIOLOGY | Age: 81
Discharge: HOME | End: 2021-12-03
Payer: MEDICARE

## 2021-12-03 ENCOUNTER — TRANSCRIBE ORDER (OUTPATIENT)
Dept: REGISTRATION | Age: 81
End: 2021-12-03

## 2021-12-03 DIAGNOSIS — M54.32 SCIATICA OF LEFT SIDE: ICD-10-CM

## 2021-12-03 DIAGNOSIS — M54.32 SCIATICA OF LEFT SIDE: Primary | ICD-10-CM

## 2021-12-03 PROCEDURE — 72100 X-RAY EXAM L-S SPINE 2/3 VWS: CPT

## 2021-12-16 ENCOUNTER — HOSPITAL ENCOUNTER (OUTPATIENT)
Dept: PHYSICAL THERAPY | Age: 81
Discharge: HOME | End: 2021-12-16
Payer: MEDICARE

## 2021-12-16 PROCEDURE — 97161 PT EVAL LOW COMPLEX 20 MIN: CPT

## 2021-12-16 PROCEDURE — 97110 THERAPEUTIC EXERCISES: CPT

## 2021-12-16 NOTE — THERAPY EVALUATION
PT INITIAL EVALUATION NOTE - [facility] 2-15    Patient Name: Ceasar Drilling  Date:2021  : 1940  [x]  Patient  Verified  Payor: Mary Currie / Plan: [place] MED[place] PART [place] & B / Product Type: Medicare /    In time: 1400  Out time: 1438  Total Treatment Time (min): 38  Total Timed Codes (min): 38  1:1 Treatment Time (MC only): 38     Visit #: 1     Treatment Area: Lower back pain [M54.50]    SUBJECTIVE  Pain Level (0-10 scale): 2/10 sitting/resting, has gotten to 10/10 with working/standing for long periods  Any medication changes, allergies to medications, adverse drug reactions, diagnosis change, or new procedure performed?: [] No    [x] Yes (see summary sheet for update)  Subjective: Cherri Snellen been having horrible back pain that has been shooting down my L side from a work accident a couple weeks ago. 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 \"       OBJECTIVE/EXAMINATION  Posture:   Forward Head, rounded shoulders  Gait and Functional Mobility:  Antalgic R>L from previous TKR   ROM: WFL except knee flexion/extension on L functionally limited due to previous TKR and work injury  MMT: WFL on LLE, RLE: hip flexion 3+/5, knee extension 4/5 and painful, knee flexion 4/5 and painful, hip abduction 3+/5    - SLR on L/R    23 min Therapeutic Exercise:  [x] See flow sheet :   Rationale: increase ROM, increase strength and improve coordination to improve the patients ability to perform in community and home more effectively          With   [x] TE   [] TA   [] neuro   [] other: Patient Education: [x] Review HEP    [] Progressed/Changed HEP based on:   [] positioning   [] body mechanics   [] transfers   [] heat/ice application    [] other:      Other Objective/Functional Measures: FOTO    Pain Level (0-10 scale) post treatment: 210    ASSESSMENT/Changes in Function: see POC    [x]  See Plan of Suzanne 68, PT, DPT, EP-C 12/16/2021

## 2021-12-16 NOTE — PROGRESS NOTES
[facility]  [address], [facility], [address] 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 Assessment/ key information: Pt. Reports to therapy with lower back pain to related arthritic/degenerative changes as well as disc pathology. Pt. Reports radicular symptoms down LLE as well as strength deficits in LLE. Future treatment sessions will focus on functional strengthening of BLE, specifically L side as well as flexibility program to relieve pressure on the low back in weight bearing positions. Pt. Will benefit from physical therapy to address the above deficits.     Evaluation Complexity History LOW Complexity : Zero comorbidities / personal factors that will impact the outcome / POC; Examination LOW Complexity : 1-2 Standardized tests and measures addressing body structure, function, activity limitation and / or participation in recreation  ;Presentation LOW Complexity : Stable, uncomplicated  ;Clinical Decision Making MEDIUM Complexity : FOTO score of 26-74  Overall Complexity Rating: LOW     Problem List: pain affecting function, decrease ROM, decrease strength and impaired gait/ balance   Treatment Plan may include any combination of the following: Therapeutic exercise, Therapeutic activities, Neuromuscular re-education, Physical agent/modality, Gait/balance training and Stair training  Patient / Family readiness to learn indicated by: asking questions, trying to perform skills and interest  Persons(s) to be included in education: patient (P)  Barriers to Learning/Limitations: None  Patient Goal (s): I want to get back to work with less pain.   Patient Self Reported Health Status: good  Rehabilitation Potential: good    Short Term Goals: To be accomplished in 8 treatments:  Pt. Will adhere to and be independent with given HEP  Pt. Will report <4/10 pain with sit <>stand, stand <>sit transfers  Long Term Goals: To be accomplished in 16 treatments:  Pt. Will report <2/10 pain with daily activities including work activities  Pt. Will score >72 on FOTO outcome measure  Pt. Will be able to perform all functional work activities with <2/10 pain as well as improved overall functional movement patterns during daily life      Frequency / Duration: Patient to be seen 1-2 times per week for 16 treatments. Patient/ Caregiver education and instruction: self care, activity modification and exercises    []  Plan of care has been reviewed with PTA    The severity rating is based on clinical judgment and the FOTO Score score. Certification Period: 12/16/21-3/16/21    Penny Alexandre PT, DPT, EP-C  12/16/2021    ________________________________________________________________________    I certify that the above Therapy Services are being furnished while the patient is under my care. I agree with the treatment plan and certify that this therapy is necessary. [de-identified] Signature:____________________  Date:____________Time: _________           Ines Salians NP    Please sign and return to: [facility]  [address], [facility], [address] [facility]:  [address] [place] [address] Street: [phone]

## 2021-12-21 ENCOUNTER — HOSPITAL ENCOUNTER (OUTPATIENT)
Dept: PHYSICAL THERAPY | Age: 81
Discharge: HOME | End: 2021-12-21
Payer: MEDICARE

## 2021-12-21 PROCEDURE — 97014 ELECTRIC STIMULATION THERAPY: CPT

## 2021-12-21 PROCEDURE — 97110 THERAPEUTIC EXERCISES: CPT

## 2021-12-21 NOTE — PROGRESS NOTES
PT DAILY TREATMENT NOTE - [facility]     Patient Name: Danny Campos  Date:2021  : 1940  [x]  Patient  Verified  Payor: [place] MEDICARE / Plan: [place] MEDICARE PART A & B / Product Type: Medicare /    In time: 8722  Out time: 113  Total Treatment Time (min): 40  Total Timed Codes (min): 40  1:1 Treatment Time ( only): 40   Visit #: 2    Treatment Area: Lower back pain [M54.50]    SUBJECTIVE  Pain Level (0-10 scale): 0  Any medication changes, allergies to medications, adverse drug reactions, diagnosis change, or new procedure performed?: [x] No    [] Yes (see summary sheet for update)  Subjective functional status/changes:   [x] No changes reported  OBJECTIVE    Modality rationale: decrease inflammation and decrease pain to improve the patients ability to perform in community and home more effectively   Min Type Additional Details   15 [x] Estim:  [x]Unatt       [x]IFC  []Premod                        []Other:  []w/ice   [x]w/heat  Position: prone  Location: lumbar spine    [] Estim: []Att    []TENS instruct  []NMES                    []Other:  []w/US   []w/ice   []w/heat  Position:  Location:    []  Traction: [] Cervical       []Lumbar                       [] Prone          []Supine                       []Intermittent   []Continuous Lbs:  [] before manual  [] after manual    []  Ultrasound: []Continuous   [] Pulsed                           []1MHz   []3MHz W/cm2:  Location:    []  Iontophoresis with dexamethasone         Location: [] Take home patch   [] In clinic    []  Ice     []  heat  []  Ice massage  []  Laser   []  Anodyne Position:  Location:    []  Laser with stim  []  Other:  Position:  Location:    []  Vasopneumatic Device Pressure:       [] lo [] med [] hi   Temperature: [] lo [] med [] hi   [x] Skin assessment post-treatment:  [x]intact [x]redness- no adverse reaction    []redness  adverse reaction:     25 min Therapeutic Exercise:  [x] See flow sheet :   Rationale: increase strength, improve coordination and improve balance to improve the patients ability to perform in community and home more effectively          With   [x] TE   [] TA   [] neuro   [] other: Patient Education: [x] Review HEP    [] Progressed/Changed HEP based on:   [] positioning   [] body mechanics   [] transfers   [] heat/ice application    [] other:      Other Objective/Functional Measures: FOTO     Pain Level (0-10 scale) post treatment:  0/10    ASSESSMENT/Changes in Function: Pt. Responded well to treatment today focusing on functional closed chain strengthening, pain reduction techniques, and gentle bilateral LE stretching to relieve pressure on the lumbar spine. Pt. Responded well to flexibility activities and struggled with form/performance in strengthening exercises which will be addressed in future sessions. Patient will continue to benefit from skilled PT services to modify and progress therapeutic interventions, address strength deficits and analyze and cue movement patterns to attain remaining goals.      [x]  See Plan of Care  []  See progress note/recertification  []  See Discharge Summary         Progress towards goals / Updated goals:  Progressing    PLAN  [x]  Upgrade activities as tolerated     [x]  Continue plan of care  []  Update interventions per flow sheet       []  Discharge due to:_  []  Other:_      Nia Davenport, PT, DPT, EP-C 12/21/2021

## 2021-12-23 ENCOUNTER — HOSPITAL ENCOUNTER (OUTPATIENT)
Dept: PHYSICAL THERAPY | Age: 81
Discharge: HOME | End: 2021-12-23
Payer: MEDICARE

## 2021-12-23 PROCEDURE — 97110 THERAPEUTIC EXERCISES: CPT

## 2021-12-23 PROCEDURE — 97014 ELECTRIC STIMULATION THERAPY: CPT

## 2021-12-23 NOTE — PROGRESS NOTES
PT DAILY TREATMENT NOTE - [facility]     Patient Name: Ibanez Standard  Date:2021  : 1940  [x]  Patient  Verified  Payor: VA MEDICARE / Plan: VA MEDICARE PART A & B / Product Type: Medicare /    In time:1311  Out time: 1355  Total Treatment Time (min): 44  Total Timed Codes (min): 44  1:1 Treatment Time ( only): 44   Visit #: 3    Treatment Area: Lower back pain [M54.50]    SUBJECTIVE  Pain Level (0-10 scale): 3/10  Any medication changes, allergies to medications, adverse drug reactions, diagnosis change, or new procedure performed?: [x] No    [] Yes (see summary sheet for update)  Subjective functional status/changes:   [x] No changes reported    OBJECTIVE    Modality rationale: decrease inflammation and decrease pain to improve the patients ability to perform in the community and home more effectively.    Min Type Additional Details   15 [x] Estim:  [x]Unatt       [x]IFC  []Premod                        []Other:  []w/ice   [x]w/heat  Position: lumbar spine  Location: prone    [] Estim: []Att    []TENS instruct  []NMES                    []Other:  []w/US   []w/ice   []w/heat  Position:  Location:    []  Traction: [] Cervical       []Lumbar                       [] Prone          []Supine                       []Intermittent   []Continuous Lbs:  [] before manual  [] after manual    []  Ultrasound: []Continuous   [] Pulsed                           []1MHz   []3MHz W/cm2:  Location:    []  Iontophoresis with dexamethasone         Location: [] Take home patch   [] In clinic    []  Ice     []  heat  []  Ice massage  []  Laser   []  Anodyne Position:  Location:    []  Laser with stim  []  Other:  Position:  Location:    []  Vasopneumatic Device Pressure:       [] lo [] med [] hi   Temperature: [] lo [] med [] hi   [x] Skin assessment post-treatment:  [x]intact [x]redness- no adverse reaction    []redness  adverse reaction:      29 min Therapeutic Exercise:  [x] See flow sheet :   Rationale: increase strength, improve coordination and improve balance to improve the patients ability to perform in the community and home more effectively          With   [x] TE   [] TA   [] neuro   [] other: Patient Education: [x] Review HEP    [x] Progressed/Changed HEP based on:   [] positioning   [] body mechanics   [] transfers   [] heat/ice application    [] other:      Other Objective/Functional Measures: FOTO    Pain Level (0-10 scale) post treatment:     ASSESSMENT/Changes in Function: Pt. Responded well to treatment today focusing on dynamic strength and stability of bilateral lower extremities as well as increasing trunk ROM in multiple planes. Pt. Struggled with LE flexibility exercises to place decreased pressure on the lumbar spine. Patient will continue to benefit from skilled PT services to modify and progress therapeutic interventions, address ROM deficits, address strength deficits, analyze and cue movement patterns and analyze and modify body mechanics/ergonomics to attain remaining goals. []  See Plan of Care  []  See progress note/recertification  []  See Discharge Summary         Progress towards goals / Updated goals:  Progressing.     PLAN  [x]  Upgrade activities as tolerated     [x]  Continue plan of care  []  Update interventions per flow sheet       []  Discharge due to:_  []  Other:_      Amrik Galindo, PT, DPT, EP-C 12/23/2021

## 2021-12-27 ENCOUNTER — HOSPITAL ENCOUNTER (OUTPATIENT)
Dept: PHYSICAL THERAPY | Age: 81
Discharge: HOME | End: 2021-12-27
Payer: MEDICARE

## 2021-12-27 PROCEDURE — 97110 THERAPEUTIC EXERCISES: CPT

## 2021-12-27 PROCEDURE — 97014 ELECTRIC STIMULATION THERAPY: CPT

## 2021-12-27 NOTE — PROGRESS NOTES
PT DAILY TREATMENT NOTE - [facility]     Patient Name: Earleen Kehr  Date:2021  : 1940  [x]  Patient  Verified  Payor: [place] MEDICARE / Plan: [place] MEDICARE PART A & B / Product Type: Medicare /    In time:1130  Out time: 1208  Total Treatment Time (min): 38  Total Timed Codes (min): 38  1:1 Treatment Time ( only): 38   Visit #: 4    Treatment Area: Lower back pain [M54.50]    SUBJECTIVE  Pain Level (0-10 scale): 3/10  Any medication changes, allergies to medications, adverse drug reactions, diagnosis change, or new procedure performed?: [x] No    [] Yes (see summary sheet for update)  Subjective functional status/changes:   [x] No changes reported    OBJECTIVE    Modality rationale: decrease inflammation and decrease pain to improve the patients ability to perform in the community and home more effectively   Min Type Additional Details   15 [x] Estim:  [x]Unatt       [x]IFC  []Premod                        []Other:  []w/ice   [x]w/heat  Position: prone  Location: lower lumbar spine    [] Estim: []Att    []TENS instruct  []NMES                    []Other:  []w/US   []w/ice   []w/heat  Position:  Location:    []  Traction: [] Cervical       []Lumbar                       [] Prone          []Supine                       []Intermittent   []Continuous Lbs:  [] before manual  [] after manual    []  Ultrasound: []Continuous   [] Pulsed                           []1MHz   []3MHz W/cm2:  Location:    []  Iontophoresis with dexamethasone         Location: [] Take home patch   [] In clinic    []  Ice     []  heat  []  Ice massage  []  Laser   []  Anodyne Position:  Location:    []  Laser with stim  []  Other:  Position:  Location:    []  Vasopneumatic Device Pressure:       [] lo [] med [] hi   Temperature: [] lo [] med [] hi   [x] Skin assessment post-treatment:  [x]intact [x]redness- no adverse reaction    []redness  adverse reaction:     23 min Therapeutic Exercise:  [x] See flow sheet :         With   [x] TE   [] TA   [] neuro   [] other: Patient Education: [x] Review HEP    [x] Progressed/Changed HEP based on:   [] positioning   [] body mechanics   [] transfers   [] heat/ice application    [] other:      Other Objective/Functional Measures: FOTO     Pain Level (0-10 scale) post treatment: 2/10    ASSESSMENT/Changes in Function: Pt. Is progressing well with physical therapy and increasing bilateral LE ROM for decreased pressure on the lumbar spine with functional movements. Patient will continue to benefit from skilled PT services to modify and progress therapeutic interventions, address ROM deficits, address strength deficits, analyze and cue movement patterns and analyze and modify body mechanics/ergonomics to attain remaining goals.      []  See Plan of Care  []  See progress note/recertification  []  See Discharge Summary         Progress towards goals / Updated goals:  Progressing    PLAN  [x]  Upgrade activities as tolerated     [x]  Continue plan of care  []  Update interventions per flow sheet       []  Discharge due to:_  []  Other:_      Mundo Lyon, PT, DPT, EP-C 12/27/2021

## 2021-12-28 ENCOUNTER — OFFICE VISIT (OUTPATIENT)
Dept: CARDIOLOGY CLINIC | Age: 81
End: 2021-12-28
Payer: MEDICARE

## 2021-12-28 VITALS
HEIGHT: 69 IN | RESPIRATION RATE: 18 BRPM | BODY MASS INDEX: 24.29 KG/M2 | TEMPERATURE: 98.2 F | OXYGEN SATURATION: 97 % | HEART RATE: 77 BPM | WEIGHT: 164 LBS | SYSTOLIC BLOOD PRESSURE: 140 MMHG | DIASTOLIC BLOOD PRESSURE: 94 MMHG

## 2021-12-28 DIAGNOSIS — I21.11 STEMI INVOLVING RIGHT CORONARY ARTERY (HCC): ICD-10-CM

## 2021-12-28 DIAGNOSIS — I25.10 CORONARY ARTERY DISEASE INVOLVING NATIVE CORONARY ARTERY OF NATIVE HEART WITHOUT ANGINA PECTORIS: Primary | ICD-10-CM

## 2021-12-28 DIAGNOSIS — I10 ESSENTIAL HYPERTENSION: ICD-10-CM

## 2021-12-28 DIAGNOSIS — R00.1 BRADYCARDIA: ICD-10-CM

## 2021-12-28 DIAGNOSIS — I49.3 PVC'S (PREMATURE VENTRICULAR CONTRACTIONS): ICD-10-CM

## 2021-12-28 DIAGNOSIS — E78.5 DYSLIPIDEMIA: ICD-10-CM

## 2021-12-28 DIAGNOSIS — Z98.61 S/P PTCA (PERCUTANEOUS TRANSLUMINAL CORONARY ANGIOPLASTY): ICD-10-CM

## 2021-12-28 PROCEDURE — G8536 NO DOC ELDER MAL SCRN: HCPCS | Performed by: INTERNAL MEDICINE

## 2021-12-28 PROCEDURE — G8510 SCR DEP NEG, NO PLAN REQD: HCPCS | Performed by: INTERNAL MEDICINE

## 2021-12-28 PROCEDURE — 1101F PT FALLS ASSESS-DOCD LE1/YR: CPT | Performed by: INTERNAL MEDICINE

## 2021-12-28 PROCEDURE — 93000 ELECTROCARDIOGRAM COMPLETE: CPT | Performed by: INTERNAL MEDICINE

## 2021-12-28 PROCEDURE — G8753 SYS BP > OR = 140: HCPCS | Performed by: INTERNAL MEDICINE

## 2021-12-28 PROCEDURE — 99214 OFFICE O/P EST MOD 30 MIN: CPT | Performed by: INTERNAL MEDICINE

## 2021-12-28 PROCEDURE — G8427 DOCREV CUR MEDS BY ELIG CLIN: HCPCS | Performed by: INTERNAL MEDICINE

## 2021-12-28 PROCEDURE — G8755 DIAS BP > OR = 90: HCPCS | Performed by: INTERNAL MEDICINE

## 2021-12-28 PROCEDURE — G8420 CALC BMI NORM PARAMETERS: HCPCS | Performed by: INTERNAL MEDICINE

## 2021-12-28 NOTE — PROGRESS NOTES
Identified pt with two pt identifiers(name and ). Reviewed record in preparation for visit and have obtained necessary documentation. Chief Complaint   Patient presents with    Coronary Artery Disease     6 month follow up.  Hypertension    Cholesterol Problem      Vitals:    21 1329   BP: (!) 140/94   Pulse: 77   Resp: 18   Temp: 98.2 °F (36.8 °C)   TempSrc: Temporal   SpO2: 97%   Weight: 164 lb (74.4 kg)   Height: 5' 9\" (1.753 m)   PainSc:   5   PainLoc: Back       Medications reviewed/approved by Dr. Lilia Fisher. Health Maintenance Review: Patient reminded of \"due or due soon\" health maintenance. I have asked the patient to contact his/her primary care provider (PCP) for follow-up on his/her health maintenance. Coordination of Care Questionnaire:  :   1) Have you been to an emergency room, urgent care, or hospitalized since your last visit? If yes, where when, and reason for visit? no       2. Have seen or consulted any other health care provider since your last visit? If yes, where when, and reason for visit? YES Sanju Meza NP for routine care. Patient is accompanied by self I have received verbal consent from Raghav Lynch to discuss any/all medical information while they are present in the room.

## 2021-12-28 NOTE — PROGRESS NOTES
Rubens Be is a 80 y.o. male is here for routine f/u.  S/p STEMI of RCA 18--transferred to [facility], primary PCI/BARRIE to RCA.  Had transient VT and chris issues early on, doing well post hosp d/c.  LVEF 50%. On ASA,  statin, lisinopril, low dose beta blocker. Brilinta stopped  one year post MI/PCI.  No current CV sx or complaints. Continues to see PCP  The patient denies chest pain/ shortness of breath, orthopnea, PND, LE edema, palpitations, syncope, presyncope or fatigue. Patient Active Problem List    Diagnosis Date Noted    Dyslipidemia 2018    Coronary artery disease involving native coronary artery of native heart without angina pectoris 2018    STEMI involving right coronary artery ([facility] [address] 2018    Sciatica of left side 2014    Hypertension 2014      Darren Sykes, NP  Past Medical History:   Diagnosis Date    CAD (coronary artery disease)     Hypercholesterolemia     Hypertension     Sciatica     STEMI (ST elevation myocardial infarction) ([facility] [address] 2018    RCA      Past Surgical History:   Procedure Laterality Date    HX PTCA  2018    PTCA/BARRIE RCA (STEMI)     No Known Allergies   No family history on file.    Social History     Socioeconomic History    Marital status:      Spouse name: Not on file    Number of children: Not on file    Years of education: Not on file    Highest education level: Not on file   Occupational History    Not on file   Tobacco Use    Smoking status: Former Smoker     Packs/day: 3.00     Years: 20.00     Pack years: 60.00     Types: Cigarettes     Quit date: 1992     Years since quittin.3    Smokeless tobacco: Never Used   Substance and Sexual Activity    Alcohol use: No    Drug use: Not on file    Sexual activity: Not on file   Other Topics Concern    Not on file   Social History Narrative    Not on file     Social Determinants of Health     Financial Resource Strain:    Renetta Difficulty of Paying Living Expenses: Not on file   Food Insecurity:     Worried About Running Out of Food in the Last Year: Not on file    Ran Out of Food in the Last Year: Not on file   Transportation Needs:     Lack of Transportation (Medical): Not on file    Lack of Transportation (Non-Medical): Not on file   Physical Activity:     Days of Exercise per Week: Not on file    Minutes of Exercise per Session: Not on file   Stress:     Feeling of Stress : Not on file   Social Connections:     Frequency of Communication with Friends and Family: Not on file    Frequency of Social Gatherings with Friends and Family: Not on file    Attends [employer] Services: Not on file    Active Member of [address] [place] [address] or Organizations: Not on file    Attends Club or Organization Meetings: Not on file    Marital Status: Not on file   Intimate Partner Violence:     Fear of Current or Ex-Partner: Not on file    Emotionally Abused: Not on file    Physically Abused: Not on file    Sexually Abused: Not on file   Housing Stability:     Unable to Pay for Housing in the Last Year: Not on file    Number of [place] in the Last Year: Not on file    Unstable Housing in the Last Year: Not on file      Current Outpatient Medications   Medication Sig    cyclobenzaprine (FLEXERIL) 10 mg tablet Take 1 Tablet by mouth three (3) times daily as needed for Muscle Spasm(s).  ergocalciferol (ERGOCALCIFEROL) 1,250 mcg (50,000 unit) capsule Take 50,000 Units by mouth.  zinc acetate 50 mg (zinc) cap Take 1 Capsule by mouth daily.  cholecalciferol, vitamin D3, (VITAMIN D3 PO) Take 2 Tablets by mouth daily.     aspirin delayed-release 81 mg tablet TAKE 1 TABLET BY MOUTH EVERY DAY    atorvastatin (LIPITOR) 40 mg tablet TAKE 1 TABLET BY MOUTH EVERY EVENING    lisinopriL (PRINIVIL, ZESTRIL) 20 mg tablet TAKE 1 TABLET BY MOUTH EVERY DAY    nitroglycerin (NITROSTAT) 0.4 mg SL tablet PLACE 1 TABLET UNDER TONGUE EVERY 5 MINUTES AS NEEDED FOR CHEST PAIN     No current facility-administered medications for this visit. Review of Symptoms:    CONST  No weight change. No fever, chills, sweats    ENT No visual changes, URI sx, sore throat    CV  See HPI   RESP  No cough, or sputum, wheezing. Also see HPI   GI  No abdominal pain or change in bowel habits. No heartburn or dysphagia. No melena or rectal bleeding.   No dysuria, urgency, frequency, hematuria   MSKEL  No joint pain, swelling. +back pain, sciatica   SKIN  No rash or lesions. NEURO  No headache, syncope, or seizure. No weakness, loss of sensation, or paresthesias. PSYCH  No low mood or depression  No anxiety. HE/LYMPH  No easy bruising, abnormal bleeding, or enlarged glands. Physical ExamPhysical Exam:    Visit Vitals  BP (!) 140/94 (BP 1 Location: Left upper arm, BP Patient Position: Sitting, BP Cuff Size: Adult)   Pulse 77   Temp 98.2 °F (36.8 °C) (Temporal)   Resp 18   Ht 5' 9\" (1.753 m)   Wt 164 lb (74.4 kg)   SpO2 97% Comment: ra   BMI 24.22 kg/m²     Gen: NAD  HEENT:  PERRL, throat clear  Neck: no adenopathy, no thyromegaly, no JVD   Heart:  sl irregular,Nl S1S2,  no murmur, gallop or rub. Lungs:  clear  Abdomen:   Soft, non-tender, bowel sounds are active.    Extremities:  No edema  Pulse: symmetric  Neuro: A&O times 3, No focal neuro deficits    Cardiographics    ECG: NSR, PVC's, NST, no acute changes      Labs:   Lab Results   Component Value Date/Time    Sodium 142 08/10/2018 03:02 AM    Sodium 142 08/09/2018 05:19 AM    Sodium 141 08/08/2018 02:01 AM    Sodium 139 08/07/2018 10:30 PM    Potassium 3.7 08/10/2018 03:02 AM    Potassium 3.8 08/09/2018 05:19 AM    Potassium 4.0 08/08/2018 02:01 AM    Potassium 3.5 08/07/2018 10:30 PM    Chloride 110 (H) 08/10/2018 03:02 AM    Chloride 111 (H) 08/09/2018 05:19 AM    Chloride 110 (H) 08/08/2018 02:01 AM    Chloride 103 08/07/2018 10:30 PM    CO2 25 08/10/2018 03:02 AM    CO2 25 08/09/2018 05:19 AM    CO2 23 08/08/2018 02:01 AM    CO2 24 08/07/2018 10:30 PM    Anion gap 7 08/10/2018 03:02 AM    Anion gap 6 08/09/2018 05:19 AM    Anion gap 8 08/08/2018 02:01 AM    Anion gap 12 08/07/2018 10:30 PM    Glucose 94 08/10/2018 03:02 AM    Glucose 94 08/09/2018 05:19 AM    Glucose 133 (H) 08/08/2018 02:01 AM    Glucose 206 (H) 08/07/2018 10:30 PM    BUN 19 08/10/2018 03:02 AM    BUN 17 08/09/2018 05:19 AM    BUN 16 08/08/2018 02:01 AM    BUN 16 08/07/2018 10:30 PM    Creatinine 0.85 08/10/2018 03:02 AM    Creatinine 0.88 08/09/2018 05:19 AM    Creatinine 0.81 08/08/2018 02:01 AM    Creatinine 1.08 08/07/2018 10:30 PM    BUN/Creatinine ratio 22 (H) 08/10/2018 03:02 AM    BUN/Creatinine ratio 19 08/09/2018 05:19 AM    BUN/Creatinine ratio 20 08/08/2018 02:01 AM    BUN/Creatinine ratio 15 08/07/2018 10:30 PM    GFR est AA >60 08/10/2018 03:02 AM    GFR est AA >60 08/09/2018 05:19 AM    GFR est AA >60 08/08/2018 02:01 AM    GFR est AA >60 08/07/2018 10:30 PM    GFR est non-AA >60 08/10/2018 03:02 AM    GFR est non-AA >60 08/09/2018 05:19 AM    GFR est non-AA >60 08/08/2018 02:01 AM    GFR est non-AA >60 08/07/2018 10:30 PM    Calcium 9.5 08/10/2018 03:02 AM    Calcium 9.4 08/09/2018 05:19 AM    Calcium 9.1 08/08/2018 02:01 AM    Calcium 10.3 (H) 08/07/2018 10:30 PM    Bilirubin, total 0.3 08/08/2018 02:01 AM    Bilirubin, total 0.3 08/07/2018 10:30 PM    Alk. phosphatase 76 08/08/2018 02:01 AM    Alk.  phosphatase 94 08/07/2018 10:30 PM    Protein, total 6.2 (L) 08/08/2018 02:01 AM    Protein, total 7.8 08/07/2018 10:30 PM    Albumin 2.9 (L) 08/08/2018 02:01 AM    Albumin 3.7 08/07/2018 10:30 PM    Globulin 3.3 08/08/2018 02:01 AM    Globulin 4.1 (H) 08/07/2018 10:30 PM    A-G Ratio 0.9 (L) 08/08/2018 02:01 AM    A-G Ratio 0.9 (L) 08/07/2018 10:30 PM    ALT (SGPT) 28 08/08/2018 02:01 AM    ALT (SGPT) 23 08/07/2018 10:30 PM     No results found for: CPK, CPKX, CPX  Lab Results   Component Value Date/Time    Cholesterol, total 123 08/09/2018 05:19 AM    HDL Cholesterol 31 08/09/2018 05:19 AM    LDL, calculated 67.4 08/09/2018 05:19 AM    Triglyceride 123 08/09/2018 05:19 AM    CHOL/HDL Ratio 4.0 08/09/2018 05:19 AM     No results found for this or any previous visit. Assessment:         Patient Active Problem List    Diagnosis Date Noted    Dyslipidemia 11/30/2018    Coronary artery disease involving native coronary artery of native heart without angina pectoris 08/22/2018    STEMI involving right coronary artery ([facility] [address] 08/08/2018    Sciatica of left side 03/14/2014    Hypertension 03/14/2014        Plan:     Doing well with no adverse cardiac symptoms. Lipids and labs followed by PCP with recent check. .  Continue current care and f/u in 6 months.     Damián Hunt MD

## 2021-12-30 ENCOUNTER — APPOINTMENT (OUTPATIENT)
Dept: PHYSICAL THERAPY | Age: 81
End: 2021-12-30
Payer: MEDICARE

## 2022-01-07 ENCOUNTER — HOSPITAL ENCOUNTER (OUTPATIENT)
Dept: PHYSICAL THERAPY | Age: 82
Discharge: HOME | End: 2022-01-07
Payer: MEDICARE

## 2022-01-07 PROCEDURE — 97014 ELECTRIC STIMULATION THERAPY: CPT

## 2022-01-07 PROCEDURE — 97110 THERAPEUTIC EXERCISES: CPT

## 2022-01-07 NOTE — PROGRESS NOTES
PT DAILY TREATMENT NOTE - [facility]     Patient Name: Kaylyn Armas  Date:2022  : 1940  [x]  Patient  Verified  Payor: [place] MEDICARE / Plan: [place] MEDICARE PART A & B / Product Type: Medicare /    In time:1445  Out time:1555  Total Treatment Time (min): 70  Total Timed Codes (min): 50  1:1 Treatment Time ( [address] only): 50   Visit #: 5 of 16    Treatment Area: Lower back pain [M54.50]    SUBJECTIVE  Pain Level (0-10 scale): 4  Any medication changes, allergies to medications, adverse drug reactions, diagnosis change, or new procedure performed?: [x] No    [] Yes (see summary sheet for update)  Subjective functional status/changes:   [] No changes reported  Ongoing anterior and medial left thigh pain, has been doing ex, feels \"stiff today\". Hopes to return to work 22    OBJECTIVE    Noted posture to consist of listing to left with relatively longer left arm, wall bumps assisted with off loading trunk and postural correction      Modality rationale: decrease edema, decrease inflammation, decrease pain, increase tissue extensibility and increase muscle contraction/control to improve the patients ability to return to work   [place] [employer] Type Additional Details   20 [x] Estim:  [x]Unatt       [x]IFC  []Premod                        []Other:  []w/ice   [x]w/heat  Position:  Location:                                      [x] Skin assessment post-treatment:  [x]intact [x]redness- no adverse reaction    []redness  adverse reaction:         50 min Therapeutic Exercise:  [x] See flow sheet :   Rationale: increase ROM, increase strength and improve balance to improve the patients ability to return to work     Pain Level (0-10 scale) post treatment: 3    ASSESSMENT/Changes in Function: No changes reported or observed    Patient will continue to benefit from skilled PT services to address strength deficits, analyze and address soft tissue restrictions and assess and modify postural abnormalities to attain remaining goals. [x]  See Plan of Care  []  See progress note/recertification  []  See Discharge Summary         PLAN  [x]  Upgrade activities as tolerated     []  Continue plan of care  []  Update interventions per flow sheet       []  Discharge due to:_  []  Other:_      Sanam Ortiz, PT 1/7/2022

## 2022-01-10 ENCOUNTER — HOSPITAL ENCOUNTER (OUTPATIENT)
Dept: PHYSICAL THERAPY | Age: 82
Discharge: HOME | End: 2022-01-10
Payer: MEDICARE

## 2022-01-10 PROCEDURE — 97014 ELECTRIC STIMULATION THERAPY: CPT

## 2022-01-10 PROCEDURE — 97110 THERAPEUTIC EXERCISES: CPT

## 2022-01-10 NOTE — PROGRESS NOTES
PT DAILY TREATMENT NOTE - [facility]     Patient Name: Maddie Cui  Date:1/10/2022  : 1940  [x]  Patient  Verified  Payor: VA MEDICARE / Plan: [place] MEDICARE PART A & B / Product Type: Medicare /    In time:1410  Out time:1530  Total Treatment Time (min): 80  Total Timed Codes (min): 60  1:1 Treatment Time ([address] only): 60   Visit #: 6 of 16     SUBJECTIVE  Pain Level (0-10 scale): 0/10  Reports trunk stiffness and left anterior and medial thigh numbness: Lower back pain [M54.50]    Any medication changes, allergies to medications, adverse drug reactions, diagnosis change, or new procedure performed?: [x] No    [] Yes (see summary sheet for update)  Subjective functional status/changes:   [] No changes reported  Reports absence of pain, improving left leg strength, and intermittent numbness    OBJECTIVE    Modality rationale: decrease inflammation, decrease pain and increase tissue extensibility to improve the patients ability to resume pain free lresumption of prior activity level   Min Type Additional Details    [x] Estim:  [x]Unatt       [x]IFC  []Premod                        []Other:  []w/ice   [x]w/heat  Position:prone  Location: lower back    [] Estim: []Att    []TENS instruct  []NMES                    []Other:  []w/US   []w/ice   []w/heat  Position:  Location:                                 [x] Skin assessment post-treatment:  [x]intact []redness- no adverse reaction    []redness  adverse reaction:   60 min Therapeutic Exercise:  [x] See flow sheet :   Rationale: increase ROM, increase strength and improve balance to improve the patients ability to resume painfree activities, return to work      Current ex list: HS stretch, pflexor stretch, hip flexor stretch, piriformis stretch, cross body stretch, camel/cat x 3 min, tail wag in quadruped                            Nustep x 7 min at 1.0                              Parallel bars for black t band hip flexor, hip extensor, hs, quad, tke, hip ab, hip ad strengthening          With   [x] TE   [] TA   [] neuro   [] other: Patient Education: [x] Review HEP    [] Progressed/Changed HEP based on:   [] positioning   [] body mechanics   [] transfers   [] heat/ice application    [x] other: black tband issued for home use. Pain Level (0-10 scale) post treatment: no pain, relays that there is  A residual \"tiny bit\" of numbness remaining pre to post rx. ASSESSMENT/Changes in Function: Question source of improvement in numbness with exercise. Will check vascular signs next rx. Patient will continue to benefit from skilled PT services to modify and progress therapeutic interventions and address strength deficits to attain remaining goals and return to work.    [x]  See Plan of Care  []  See progress note/recertification  []  See Discharge Summary           PLAN  [x]  Upgrade activities as tolerated     [x]  Continue plan of care  [x]  Update interventions per flow sheet       []  Discharge due to:_  []  Other:_      Dre Duarte, PT 1/10/2022

## 2022-01-12 ENCOUNTER — HOSPITAL ENCOUNTER (OUTPATIENT)
Dept: PHYSICAL THERAPY | Age: 82
Discharge: HOME | End: 2022-01-12
Payer: MEDICARE

## 2022-01-12 PROCEDURE — 97014 ELECTRIC STIMULATION THERAPY: CPT

## 2022-01-12 PROCEDURE — 97110 THERAPEUTIC EXERCISES: CPT

## 2022-01-12 NOTE — PROGRESS NOTES
PT DAILY TREATMENT NOTE - [facility]     Patient Name: Taye Deutsch  Date:2022  : 1940  [x]  Patient  Verified  Payor: VA MEDICARE / Plan: [place] MEDICARE PART A & B / Product Type: Medicare /    In time:0830  Out [id number]  Total Treatment Time (min): 75  Total Timed Codes (min): 55  1:1 Treatment Time ( only): 54   Visit #: 7 of 16    Treatment Area: Lower back pain [M54.50]    SUBJECTIVE  Pain Level (0-10 scale): 0,,  Any medication changes, allergies to medications, adverse drug reactions, diagnosis change, or new procedure performed?: [x] No    [] Yes (see summary sheet for update)rSubjective functional status/changes:   [] No changes reported  Relays that he has no pain and more mobility since aggressive mobility activities at last rx. Further reports ongoing numbness intermittently of left medial and anterior thigh    OBJECTIVE    Palpation:   DP pulse and capillary refill symmetric from right to left.     Modality rationale: decrease inflammation and increase tissue extensibility to improve the patients ability to return to previous painfree and safe mobility   Min Type Additional Details   20 [x] Estim:  [x]Unatt       [x]IFC  []Premod                        []Other:  []w/ice   [x]w/heat  Position: L sidelying  Location:                                      [x] Skin assessment post-treatment:  [x]intact [x]redness- no adverse reaction    []redness  adverse reaction:              45 min Therapeutic Exercise:  [x] See flow sheet :   Rationale: increase strength and improve balance to improve the patients ability to return to safe and painfree mobility            With   [] TE   [] TA   [] neuro   [] other: Patient Education: [x] Review HEP    [] Progressed/Changed HEP based on:   [] positioning   [] body mechanics   [] transfers   [] heat/ice application    [x] other: rationale for alteration of ifc site     Other Objective/Functional Measures: Noted increased arm swing during gait     Pain Level (0-10 scale) post treatment: 0/10    ASSESSMENT/Changes in Function: Pleasant man with increasing trunk mobility no pain and ongoing l le weakness and intermittent numbness    Patient will continue to benefit from skilled PT services to modify and progress therapeutic interventions and address strength deficits to attain remaining goals.      [x]  See Plan of Care  []  See progress note/recertification  []  See Discharge Summary         PLAN  [x]  Upgrade activities as tolerated     []  Continue plan of care  [x]  Update interventions per flow sheet       []  Discharge due to:_  []  Other:_      Trini Kong, PT 1/12/2022

## 2022-01-17 ENCOUNTER — APPOINTMENT (OUTPATIENT)
Dept: PHYSICAL THERAPY | Age: 82
End: 2022-01-17
Payer: MEDICARE

## 2022-01-19 RX ORDER — ASPIRIN 81 MG/1
TABLET ORAL
Qty: 90 TABLET | Refills: 3 | Status: SHIPPED | OUTPATIENT
Start: 2022-01-19

## 2022-01-21 ENCOUNTER — HOSPITAL ENCOUNTER (OUTPATIENT)
Dept: PHYSICAL THERAPY | Age: 82
Discharge: HOME | End: 2022-01-21
Payer: MEDICARE

## 2022-01-21 PROCEDURE — 97014 ELECTRIC STIMULATION THERAPY: CPT

## 2022-01-21 PROCEDURE — 97140 MANUAL THERAPY 1/> REGIONS: CPT

## 2022-01-21 NOTE — PROGRESS NOTES
PT DAILY TREATMENT NOTE - [facility]     Patient Name: Isaiah Ornelas  Date:2022  : 1940  [x]  Patient  Verified  Payor: VA MEDICARE / Plan: VA MEDICARE PART A & B / Product Type: Medicare /    In time:1138  Out time:1226-  Total Treatment Time (min):48  Total Timed Codes (min): 28  1:1 Treatment Time ( only): 28   Visit #: *** of 16    Treatment Area: Lower back pain [M54.50]    SUBJECTIVE  Pain Level (0-10 scale): reports ongoing  0/10 pain with intermittent anterior knee and medial thigh pain    Any medication changes, allergies to medications, adverse drug reactions, diagnosis change, or new procedure performed?: [x] No    [] Yes (see summary sheet for update)    Subjective functional status/changes:   [] No changes reported  Feels as if he is walking with less stiffness    OBJECTIVE    Modality rationale: decrease inflammation, increase tissue extensibility and increase muscle contraction/control to improve the patients ability to return to previous employment status   Min Type Additional Details   20 [x] Estim:  [x]Unatt       [x]IFC  []Premod                        []Other:  []w/ice   [x]w/heat  Position:prone  Location: B LS paraspinals                                      [x] Skin assessment post-treatment:  [x]intact []redness- no adverse reaction    []redness  adverse reaction:           28 min Manual Therapy:  Cross body stretch in prone and sidelying, lefthipextrotationwithflexion pre's, use of casey for ischemic compression to L5-S1TP and right lumbar paraspinal ropiness,  unwedge of sacrum f/b max glut isometric   Rationale: increase ROM, increase tissue extensibility and decrease trigger points to permit pt to return to gainful employment         With   [] TE   [] TA   [] neuro   [] other: Patient Education: [x] Review HEP    [] Progressed/Changed HEP based on:   [] positioning   [] body mechanics   [] transfers   [] heat/ice application    [] other:      Other Objective/Functional Measures: pt amb with increased speed, increased arm swing, and beginning of trunk rotation post rx     Pain Level (0-10 scale) post treatment: 0/10    ASSESSMENT/Changes in Function: This pleasant man expresses that he is pleased with results of therapy thus far, has not had significant pain for a couple of weeks, but is troubled by lack of medial thigh strength and medial thigh numbness. Lumbar paraspinals are ropy and tender to palpationa    Patient will continue to benefit from skilled PT services to modify and progress therapeutic interventions, address strength deficits and analyze and address soft tissue restrictions to attain remaining goals.      [x]  See Plan of Care  []  See progress note/recertification  []  See Discharge Summary     PLAN  [x]  Upgrade activities as tolerated     [x]  Continue plan of care  [x]  Update interventions per flow sheet       []  Discharge due to:_  []  Other:_      Mp Espinoza, PT 1/21/2022

## 2022-01-26 ENCOUNTER — HOSPITAL ENCOUNTER (OUTPATIENT)
Dept: PHYSICAL THERAPY | Age: 82
Discharge: HOME | End: 2022-01-26
Payer: MEDICARE

## 2022-01-26 PROCEDURE — 97014 ELECTRIC STIMULATION THERAPY: CPT

## 2022-01-26 PROCEDURE — 97110 THERAPEUTIC EXERCISES: CPT

## 2022-01-26 PROCEDURE — 97140 MANUAL THERAPY 1/> REGIONS: CPT

## 2022-01-26 NOTE — PROGRESS NOTES
PT DAILY TREATMENT NOTE - [facility]     Patient Name: Marshall Lewis  Date:2022  : 1940  [x]  Patient  Verified  Payor: [place] MED[place]ARE / Plan: [place] MEDICARE PART A & B / Product Type: Medicare /    In time:   Out time:  1448   Total Treatment Time (min): 50  Total Timed Codes (min): 38  1:1 Treatment Time ( only):  38  Visit #: 9    Treatment Area: Lower back pain [M54.50]    SUBJECTIVE  Pain Level (0-10 scale): 0/10  Any medication changes, allergies to medications, adverse drug reactions, diagnosis change, or new procedure performed?: [x] No    [] Yes (see summary sheet for update)  Subjective functional status/changes:   [x] No changes reported    OBJECTIVE    Modality rationale: decrease inflammation and decrease pain to improve the patients ability to perform at work and home effectively   Min Type Additional Details   15 [x] Estim:  [x]Unatt       [x]IFC  []Premod                        []Other:  []w/ice   [x]w/heat  Position:  Prone  Location: Lumbar spine    [] Estim: []Att    []TENS instruct  []NMES                    []Other:  []w/US   []w/ice   []w/heat  Position:  Location:    []  Traction: [] Cervical       []Lumbar                       [] Prone          []Supine                       []Intermittent   []Continuous Lbs:  [] before manual  [] after manual    []  Ultrasound: []Continuous   [] Pulsed                           []1MHz   []3MHz W/cm2:  Location:    []  Iontophoresis with dexamethasone         Location: [] Take home patch   [] In clinic    []  Ice     []  heat  []  Ice massage  []  Laser   []  Anodyne Position:  Location:    []  Laser with stim  []  Other:  Position:  Location:    []  Vasopneumatic Device Pressure:       [] lo [] med [] hi   Temperature: [] lo [] med [] hi   [x] Skin assessment post-treatment:  [x]intact [x]redness- no adverse reaction    []redness  adverse reaction:     15 min Therapeutic Exercise:  [x] See flow sheet :   Rationale: increase strength, improve coordination and improve balance to improve the patients ability to perform in the community and work effectively    20 min- Manual therapy- CFM of lumbar paraspinals as well as manual A-P mobilizations of spinous processes Grade II/III. With   [x] TE   [] TA   [] neuro   [] other: Patient Education: [x] Review HEP    [] Progressed/Changed HEP based on:   [] positioning   [] body mechanics   [] transfers   [] heat/ice application    [] other:      Other Objective/Functional Measures: FOTO     Pain Level (0-10 scale) post treatment: 0/10    ASSESSMENT/Changes in Function: See Progress note    Patient will continue to benefit from skilled PT services to modify and progress therapeutic interventions, address strength deficits, analyze and cue movement patterns and assess and modify postural abnormalities to attain remaining goals.      []  See Plan of Care  [x]  See progress note/recertification  []  See Discharge Summary         Progress towards goals / Updated goals:  Progressing    PLAN  []  Upgrade activities as tolerated     [x]  Continue plan of care  []  Update interventions per flow sheet       []  Discharge due to:_  []  Other:_      Aashish Joshua, PT, DPT, EP-C 1/26/2022

## 2022-01-26 NOTE — PROGRESS NOTES
[address], [address] [place] [address]  Phone: [phone]      Fax:  [phone]    Progress Note    Name: Casper Mckinnon   : 1940   MD: Anali Perry NP       Treatment Diagnosis: Lower back pain [M54.50]  Start of Care: 21     Visits from Start of Care: 9  Missed Visits: 0    Assessment / Recommendations: Pt. Has been progressing well with therapy sessions with overall functional movement patterns improving as well. Pt. Still complains of radicular symptoms intermittently but has reported great increases in strength and pain decreased with functional activities related to work tasks. Pt. Is close to physical therapy discharge and will be appropriate to return to work when all goals are reached. Goal: Pt. Will report <2/10 pain with daily activities including work activities  Status at last note/certification: progressing  Status at progress note: met    Goal: Pt. Will be able to perform all functional work activities with <2/10 pain as well as improved overall functional movement patterns during daily life  Status at last note/certification: progressing  Status at progress note: progressing    Goal: Pt. Will report <4/10 pain with sit <>stand, stand <>sit transfers  Status at last note/certification: progressing  Status at progress note: met    Stephanie Levine, PT, DPT, EP-C  22 2:02 PM    ________________________________________________________________________  NOTE TO PHYSICIAN:  Please complete the following and fax to: [facility]: Fax: [phone]. Israel Sandhu Retain this original for your records. If you are unable to process this request in 24 hours, please contact our office.        ____ I have read the above report and request that my patient continue therapy with the following changes/special instructions:  ____ I have read the above report and request that my patient be discharged from therapy    Physician's Signature:_________________ Date:___________Time:__________

## 2022-01-27 DIAGNOSIS — I21.11 STEMI INVOLVING RIGHT CORONARY ARTERY (HCC): ICD-10-CM

## 2022-01-27 DIAGNOSIS — I25.10 CORONARY ARTERY DISEASE INVOLVING NATIVE CORONARY ARTERY OF NATIVE HEART WITHOUT ANGINA PECTORIS: ICD-10-CM

## 2022-01-27 DIAGNOSIS — I10 HYPERTENSION, UNSPECIFIED TYPE: ICD-10-CM

## 2022-01-27 RX ORDER — NITROGLYCERIN 0.4 MG/1
0.4 TABLET SUBLINGUAL
Qty: 25 TABLET | Refills: 1 | Status: SHIPPED | OUTPATIENT
Start: 2022-01-27

## 2022-01-28 ENCOUNTER — HOSPITAL ENCOUNTER (OUTPATIENT)
Dept: PHYSICAL THERAPY | Age: 82
Discharge: HOME | End: 2022-01-28
Payer: MEDICARE

## 2022-01-28 PROCEDURE — 97140 MANUAL THERAPY 1/> REGIONS: CPT

## 2022-01-28 PROCEDURE — 97014 ELECTRIC STIMULATION THERAPY: CPT

## 2022-01-28 PROCEDURE — 97110 THERAPEUTIC EXERCISES: CPT

## 2022-01-28 NOTE — THERAPY DISCHARGE
PhysicalTherapy Discharge Summary    Patient name: Pepito Fisher Start of Care: 21   Referral source: Reggie Rodriguez NP : 1940   Medical/Treatment Diagnosis: Lower back pain [M54.50] Onset Date:21     Prior Hospitalization: see medical history Provider#: Medications: Verified on Patient Summary List    Comorbidities: see medical history  Prior Level of Function: independent Ambulator without an assistive device  Visits from Start of Care: 10    Missed Visits: 1  Reporting Period : 21 to 21      Summary of Care: Pt. Has progressed well with therapy and has reached all goals including dereased pain (<2/10) with all functional activities. Pt. Continues to report some radicular syptoms but has been more intermittent and less with movement. Pt. Will have lifting restriction for return to work next 22 and PT will draft. Pt. Has benefited from physical therapy and has been educated on proper lifting ergonomics as well as breathing techniques for the workplace when lifting. Goal: Pt. Will report <2/10 pain with daily activities including work activities  Status at last note/certification: progressing  Status at discharge: met    Goal: Pt. Will be able to perform all functional work activities with <2/10 pain as well as improved overall functional movement patterns during daily life  Status at last note/certification: progressing  Status at discharge: met    Goal: Pt. Will report <4/10 pain with sit <>stand, stand <>sit transfers  Status at last note/certification: met  Status at discharge: met    The severity rating is based on clinical judgment and the FOTO score.     ASSESSMENT/RECOMMENDATIONS:  [x]Discontinue therapy: [x]Patient has reached or is progressing toward set goals      []Patient is non-compliant or has abdicated      []Due to lack of appreciable progress towards set goals    Penelope Ivy, PT, DPT, EP-C  2022

## 2022-01-28 NOTE — PROGRESS NOTES
PT DAILY TREATMENT NOTE - [facility]     Patient Name: Jannie Ramirez  Date:2022  : 1940  [x]  Patient  Verified  Payor: [place] MEDICARE / Plan: [place] MEDICARE PART A & B / Product Type: Medicare /    In time:1045  Out time: 1123  Total Treatment Time (min): 38  Total Timed Codes (min): 23  1:1 Treatment Time ( only): 23   Visit #: 10    Treatment Area: Lower back pain [M54.50]    SUBJECTIVE  Pain Level (0-10 scale): 0/10  Any medication changes, allergies to medications, adverse drug reactions, diagnosis change, or new procedure performed?: [x] No    [] Yes (see summary sheet for update)  Subjective functional status/changes:   [x] No changes reported    OBJECTIVE    Modality rationale: decrease inflammation and decrease pain to improve the patients ability to perform at work and in the community effectively   Min Type Additional Details   15 [x] Estim:  [x]Unatt       [x]IFC  []Premod                        []Other:  []w/ice   [x]w/heat  Position: Prone  Location: lumbar spine    [] Estim: []Att    []TENS instruct  []NMES                    []Other:  []w/US   []w/ice   []w/heat  Position:  Location:    []  Traction: [] Cervical       []Lumbar                       [] Prone          []Supine                       []Intermittent   []Continuous Lbs:  [] before manual  [] after manual    []  Ultrasound: []Continuous   [] Pulsed                           []1MHz   []3MHz W/cm2:  Location:    []  Iontophoresis with dexamethasone         Location: [] Take home patch   [] In clinic    []  Ice     []  heat  []  Ice massage  []  Laser   []  Anodyne Position:  Location:    []  Laser with stim  []  Other:  Position:  Location:    []  Vasopneumatic Device Pressure:       [] lo [] med [] hi   Temperature: [] lo [] med [] hi   [x] Skin assessment post-treatment:  [x]intact [x]redness- no adverse reaction    []redness  adverse reaction:      8 min Therapeutic Exercise:  [x] See flow sheet :   Rationale: increase strength and improve coordination to improve the patients ability to perform at work and in the community effectively    15 min: Manual therapy: CFM to lumbar paraspinals with trigger point release.           With   [x] TE   [] TA   [] neuro   [] other: Patient Education: [x] Review HEP    [] Progressed/Changed HEP based on:   [] positioning   [] body mechanics   [] transfers   [] heat/ice application    [] other:      Other Objective/Functional Measures:  FOTO     Pain Level (0-10 scale) post treatment: 0/10    ASSESSMENT/Changes in Function: See D/c     []  See Plan of Care  []  See progress note/recertification  [x]  See Discharge Summary         Progress towards goals / Updated goals:  Goals met    PLAN  []  Upgrade activities as tolerated     []  Continue plan of care  []  Update interventions per flow sheet       [x]  Discharge due to: goals met  []  Other:_      Vik Higginbotham PT, DPT, EP-C 1/28/2022

## 2022-03-18 PROBLEM — E78.5 DYSLIPIDEMIA: Status: ACTIVE | Noted: 2018-11-30

## 2022-03-18 PROBLEM — I25.10 CORONARY ARTERY DISEASE INVOLVING NATIVE CORONARY ARTERY OF NATIVE HEART WITHOUT ANGINA PECTORIS: Status: ACTIVE | Noted: 2018-08-22

## 2022-03-19 PROBLEM — I21.11 STEMI INVOLVING RIGHT CORONARY ARTERY (HCC): Status: ACTIVE | Noted: 2018-08-08

## 2022-03-23 ENCOUNTER — HOSPITAL ENCOUNTER (EMERGENCY)
Age: 82
Discharge: HOME | End: 2022-03-24
Attending: FAMILY MEDICINE
Payer: MEDICARE

## 2022-03-23 DIAGNOSIS — R33.9 URINARY RETENTION: Primary | ICD-10-CM

## 2022-03-23 PROCEDURE — 99283 EMERGENCY DEPT VISIT LOW MDM: CPT

## 2022-03-23 PROCEDURE — 51702 INSERT TEMP BLADDER CATH: CPT

## 2022-03-23 RX ORDER — LIDOCAINE HYDROCHLORIDE 20 MG/ML
JELLY TOPICAL
Status: COMPLETED | OUTPATIENT
Start: 2022-03-24 | End: 2022-03-24

## 2022-03-24 VITALS
OXYGEN SATURATION: 97 % | BODY MASS INDEX: 24.14 KG/M2 | SYSTOLIC BLOOD PRESSURE: 141 MMHG | WEIGHT: 163 LBS | DIASTOLIC BLOOD PRESSURE: 74 MMHG | HEIGHT: 69 IN | HEART RATE: 89 BPM | RESPIRATION RATE: 16 BRPM | TEMPERATURE: 98.1 F

## 2022-03-24 LAB
APPEARANCE UR: CLEAR
BACTERIA URNS QL MICRO: NEGATIVE /HPF
BILIRUB UR QL: NEGATIVE
COLOR UR: ABNORMAL
EPITH CASTS URNS QL MICRO: ABNORMAL /LPF
GLUCOSE UR STRIP.AUTO-MCNC: NEGATIVE MG/DL
HGB UR QL STRIP: ABNORMAL
KETONES UR QL STRIP.AUTO: NEGATIVE MG/DL
LEUKOCYTE ESTERASE UR QL STRIP.AUTO: NEGATIVE
NITRITE UR QL STRIP.AUTO: NEGATIVE
PH UR STRIP: 6 [PH] (ref 5–8)
PROT UR STRIP-MCNC: NEGATIVE MG/DL
RBC #/AREA URNS HPF: >100 /HPF (ref 0–5)
SP GR UR REFRACTOMETRY: 1.01 (ref 1–1.03)
UROBILINOGEN UR QL STRIP.AUTO: 0.2 EU/DL (ref 0.2–1)
WBC URNS QL MICRO: ABNORMAL /HPF (ref 0–4)

## 2022-03-24 PROCEDURE — 74011000250 HC RX REV CODE- 250: Performed by: FAMILY MEDICINE

## 2022-03-24 PROCEDURE — 81001 URINALYSIS AUTO W/SCOPE: CPT

## 2022-03-24 PROCEDURE — 51798 US URINE CAPACITY MEASURE: CPT

## 2022-03-24 RX ORDER — LISINOPRIL 40 MG/1
TABLET ORAL
COMMUNITY
Start: 2022-03-21 | End: 2022-03-24

## 2022-03-24 RX ORDER — HYDROCODONE BITARTRATE AND ACETAMINOPHEN 5; 325 MG/1; MG/1
1 TABLET ORAL
COMMUNITY
Start: 2022-03-23 | End: 2022-03-30

## 2022-03-24 RX ADMIN — LIDOCAINE HYDROCHLORIDE 20 ML: 20 JELLY TOPICAL at 00:09

## 2022-03-24 NOTE — DISCHARGE INSTRUCTIONS
--Return to the ED if the catheter stops draining. You may have some blood in the urine, but you do not need to return if the urine is still draining properly.

## 2022-03-24 NOTE — ED TRIAGE NOTES
Pt reports that he had back surgery (lamonectomy) at [facility] today and only voided a small amount prior to discharge around 1600.  Pt reports suprapubic pain with urinary retention since leaving hospital.

## 2022-03-24 NOTE — ED PROVIDER NOTES
EMERGENCY DEPARTMENT HISTORY AND PHYSICAL EXAM          Date: 3/23/2022  Patient Name: Geraldine Sharpe    History of Presenting Illness     Chief Complaint   Patient presents with    Urinary Retention       History Provided By: Patient    HPI: Geraldine Sharpe is a 80 y.o. male, pmhx as below, who was brought to the ED by his daughter because of urinary retention. He had a [id number] hours ago and discharged from the hospital 4 hours later. Before discharge from the hospital, he reports that he was able to void a small amount. When he tried to urinate about an hour ago, he again was only able to void a small amount, with lower abdominal pain. PCP: Akilah Adrian NP    Allergies: NKDA  Social Hx: Denies tobacco, vaping, EtOH, Illicit Drugs; Lives locally    There are no other complaints, changes, or physical findings at this time. Current Outpatient Medications   Medication Sig Dispense Refill    aspirin delayed-release 81 mg tablet TAKE 1 TABLET BY MOUTH EVERY DAY 90 Tablet 3    acetaminophen (TYLENOL PO) Take  by mouth as needed.  atorvastatin (LIPITOR) 40 mg tablet TAKE 1 TABLET BY MOUTH EVERY EVENING 90 Tab 3    lisinopriL (PRINIVIL, ZESTRIL) 20 mg tablet TAKE 1 TABLET BY MOUTH EVERY DAY 90 Tab 0    HYDROcodone-acetaminophen (NORCO) 5-325 mg per tablet Take 1 Tablet by mouth every six (6) hours as needed. (Patient not taking: Reported on 3/24/2022)      nitroglycerin (NITROSTAT) 0.4 mg SL tablet 1 Tablet by SubLINGual route every five (5) minutes as needed for Chest Pain (max 3 tabs. if chest pain not relieved by 3rd tab go to hospital emergently). (Patient not taking: Reported on 3/24/2022) 25 Tablet 1    cyclobenzaprine (FLEXERIL) 10 mg tablet Take 1 Tablet by mouth three (3) times daily as needed for Muscle Spasm(s).  (Patient not taking: Reported on 3/24/2022) 12 Tablet 0    ergocalciferol (ERGOCALCIFEROL) 1,250 mcg (50,000 unit) capsule Take 50,000 Units by mouth. (Patient not taking: Reported on 3/24/2022)      zinc acetate 50 mg (zinc) cap Take 1 Capsule by mouth daily. (Patient not taking: Reported on 3/24/2022)      cholecalciferol, vitamin D3, (VITAMIN D3 PO) Take 2 Tablets by mouth daily. (Patient not taking: Reported on 3/24/2022)         Past History     Past Medical History:  Past Medical History:   Diagnosis Date    CAD (coronary artery disease)     Hypercholesterolemia     Hypertension     Sciatica     STEMI (ST elevation myocardial infarction) ([facility] [address] 2018    RCA       Past Surgical History:  Past Surgical History:   Procedure Laterality Date    HX BACK SURGERY      HX PTCA  2018    PTCA/BARRIE RCA (STEMI)       Family History:  History reviewed. No pertinent family history. Social History:  Social History     Tobacco Use    Smoking status: Former Smoker     Packs/day: 3.00     Years: 20.00     Pack years: 60.00     Types: Cigarettes     Quit date: 1992     Years since quittin.6    Smokeless tobacco: Never Used   Vaping Use    Vaping Use: Never used   Substance Use Topics    Alcohol use: No    Drug use: Never       Allergies:  No Known Allergies      Review of Systems   Review of Systems   Constitutional: Negative for fever. Gastrointestinal: Positive for abdominal pain. Genitourinary: Positive for difficulty urinating and penile pain. Negative for dysuria and flank pain. Physical Exam   Physical Exam  Constitutional:       Appearance: Normal appearance. Comments: Examined after placement of urinary catheter. HENT:      Head: Normocephalic. Nose: Nose normal.      Mouth/Throat:      Mouth: Mucous membranes are moist.   Eyes:      Pupils: Pupils are equal, round, and reactive to light. Cardiovascular:      Rate and Rhythm: Normal rate and regular rhythm.    Pulmonary:      Effort: Pulmonary effort is normal.   Abdominal: General: Abdomen is flat. Palpations: Abdomen is soft. Tenderness: There is no abdominal tenderness. Genitourinary:     Penis: Normal.       Testes: Normal.      Comments: Urinary catheter in place. Musculoskeletal:         General: Normal range of motion. Cervical back: Normal range of motion and neck supple. Skin:     General: Skin is warm and dry. Neurological:      General: No focal deficit present. Mental Status: He is alert and oriented to person, place, and time. Diagnostic Study Results     Labs -     Recent Results (from the past 12 hour(s))   URINALYSIS W/MICROSCOPIC    Collection Time: 03/24/22 12:17 AM   Result Value Ref Range    Color YELLOW/STRAW      Appearance CLEAR CLEAR      Specific gravity 1.015 1.003 - 1.030      pH (UA) 6.0 5.0 - 8.0      Protein Negative NEG mg/dL    Glucose Negative NEG mg/dL    Ketone Negative NEG mg/dL    Bilirubin Negative NEG      Blood LARGE (A) NEG      Urobilinogen 0.2 0.2 - 1.0 EU/dL    Nitrites Negative NEG      Leukocyte Esterase Negative NEG      WBC 5-10 0 - 4 /hpf    RBC >100 (H) 0 - 5 /hpf    Epithelial cells FEW FEW /lpf    Bacteria Negative NEG /hpf       Radiologic Studies -   No orders to display     CT Results  (Last 48 hours)    None        CXR Results  (Last 48 hours)    None            Medical Decision Making   I am the first provider for this patient. I reviewed the vital signs, available nursing notes, past medical history, past surgical history, family history and social history. Vital Signs-Reviewed the patient's vital signs. Patient Vitals for the past 12 hrs:   Temp Pulse Resp BP SpO2   03/24/22 0037  89 16 (!) 141/74 97 %   03/24/22 0002 98.1 °F (36.7 °C) 83 17 (!) 194/105 96 %       Pulse Oximetry Analysis - 97% on RA      Records Reviewed: Nursing Notes and Old Medical Records    Provider Notes (Medical Decision Making):   MDM: Elderly man with urinary retention hours after surgery.  Will place catheter. ED Course:   Initial assessment performed. The patients presenting problems have been discussed, and they are in agreement with the care plan formulated and outlined with them. I have encouraged them to ask questions as they arise throughout their visit. PROGRESS NOTE:    Catheter placed in bladder, yielding 850 ml urine. Pt felt almost completely better after that. UA with RBC's but no bacteria or WBC's. Discharge note:  Pt re-evaluated and noted to be feeling better , ready for discharge. Updated pt on all final lab  findings. Will follow up as instructed with urology. All questions have been answered, pt voiced understanding and agreement with plan. Specific return precautions provided as well as instructions to return to the ED should sx worsen at any time. Vital signs stable for discharge. Critical Care Time:   0      Diagnosis     Clinical Impression:   1. Urinary retention        PLAN:  1. Discharge home with catheter in place. Discharge Medication List as of 3/24/2022 12:38 AM        2.    Follow-up Information     Follow up With Specialties Details Why Rukhsana Bryant MD Urology In 1 day  [facility]   [address]  [phone]          Return to ED if worse     Disposition:  Home

## 2022-04-13 ENCOUNTER — HOSPITAL ENCOUNTER (EMERGENCY)
Age: 82
Discharge: HOME | End: 2022-04-13
Attending: EMERGENCY MEDICINE
Payer: MEDICARE

## 2022-04-13 VITALS
HEIGHT: 69 IN | BODY MASS INDEX: 24.44 KG/M2 | OXYGEN SATURATION: 95 % | SYSTOLIC BLOOD PRESSURE: 148 MMHG | RESPIRATION RATE: 18 BRPM | DIASTOLIC BLOOD PRESSURE: 72 MMHG | TEMPERATURE: 97.5 F | HEART RATE: 77 BPM | WEIGHT: 165 LBS

## 2022-04-13 DIAGNOSIS — T83.011A MALFUNCTION OF FOLEY CATHETER, INITIAL ENCOUNTER (HCC): Primary | ICD-10-CM

## 2022-04-13 PROCEDURE — 99282 EMERGENCY DEPT VISIT SF MDM: CPT

## 2022-04-13 NOTE — ED PROVIDER NOTES
EMERGENCY DEPARTMENT HISTORY AND PHYSICAL EXAM      Date: 4/13/2022  Patient Name: Joycelyn Matt    History of Presenting Illness     Chief Complaint   Patient presents with    Urinary Catheter Problem       History Provided By: Patient    HPI: Joycelyn Matt, 80 y.o. male with PMHx significant for hypertension, CAD, high cholesterol, BPH presents ambulatory to the ED with cc of urinary catheter problem. Patient reports fatigue connection to the bag has become damaged and it is no longer working and leaking. Denies any abdominal pain. He denies any problems with the catheter itself draining. No fevers or chills. No back pain. He is otherwise without complaints. He is followed by [place] [facility] office in [facility] and has an appointment on May 3 for catheter removal and voiding trial.        PMHx: Significant for CAD, hypertension, high cholesterol, BPH  PSHx: Significant for RCA stent, 2018. Back surgery. Social Hx: Former smoker. Quit in 1992.  3 packs a day for 20 years. No alcohol use. There are no other complaints, changes, or physical findings at this time. PCP: Radha Arias NP    No current facility-administered medications on file prior to encounter. Current Outpatient Medications on File Prior to Encounter   Medication Sig Dispense Refill    nitroglycerin (NITROSTAT) 0.4 mg SL tablet 1 Tablet by SubLINGual route every five (5) minutes as needed for Chest Pain (max 3 tabs. if chest pain not relieved by 3rd tab go to hospital emergently). (Patient not taking: Reported on 3/24/2022) 25 Tablet 1    aspirin delayed-release 81 mg tablet TAKE 1 TABLET BY MOUTH EVERY DAY 90 Tablet 3    acetaminophen (TYLENOL PO) Take  by mouth as needed.  cyclobenzaprine (FLEXERIL) 10 mg tablet Take 1 Tablet by mouth three (3) times daily as needed for Muscle Spasm(s).  (Patient not taking: Reported on 3/24/2022) 12 Tablet 0    ergocalciferol (ERGOCALCIFEROL) 1,250 mcg (50,000 unit) capsule Take 50,000 Units by mouth. (Patient not taking: Reported on 3/24/2022)      zinc acetate 50 mg (zinc) cap Take 1 Capsule by mouth daily. (Patient not taking: Reported on 3/24/2022)      cholecalciferol, vitamin D3, (VITAMIN D3 PO) Take 2 Tablets by mouth daily. (Patient not taking: Reported on 3/24/2022)      atorvastatin (LIPITOR) 40 mg tablet TAKE 1 TABLET BY MOUTH EVERY EVENING 90 Tab 3    lisinopriL (PRINIVIL, ZESTRIL) 20 mg tablet TAKE 1 TABLET BY MOUTH EVERY DAY 90 Tab 0       Past History     Past Medical History:  Past Medical History:   Diagnosis Date    CAD (coronary artery disease)     Hypercholesterolemia     Hypertension     Sciatica     STEMI (ST elevation myocardial infarction) ([facility] 2018    RCA       Past Surgical History:  Past Surgical History:   Procedure Laterality Date    HX BACK SURGERY      HX PTCA  2018    PTCA/BARRIE RCA (STEMI)       Family History:  History reviewed. No pertinent family history. Social History:  Social History     Tobacco Use    Smoking status: Former Smoker     Packs/day: 3.00     Years: 20.00     Pack years: 60.00     Types: Cigarettes     Quit date: 1992     Years since quittin.6    Smokeless tobacco: Never Used   Vaping Use    Vaping Use: Never used   Substance Use Topics    Alcohol use: No    Drug use: Never       Allergies:  No Known Allergies      Review of Systems   Review of Systems   Constitutional: Negative for activity change, chills and fever. HENT: Negative for congestion and sore throat. Eyes: Negative for pain and redness. Respiratory: Negative for cough, chest tightness and shortness of breath. Cardiovascular: Negative for chest pain and palpitations. Gastrointestinal: Negative for abdominal pain, diarrhea, nausea and vomiting. Genitourinary: Negative for dysuria, frequency and urgency. Musculoskeletal: Negative for back pain and neck pain. Skin: Negative for rash.    Neurological: Negative for syncope, light-headedness and headaches. Psychiatric/Behavioral: Negative for confusion. All other systems reviewed and are negative. Physical Exam   Physical Exam  Constitutional:       General: He is not in acute distress. Appearance: He is well-developed. He is not diaphoretic. HENT:      Head: Normocephalic and atraumatic. Eyes:      General: No scleral icterus. Conjunctiva/sclera: Conjunctivae normal.      Pupils: Pupils are equal, round, and reactive to light. Pulmonary:      Effort: Pulmonary effort is normal.   Genitourinary:     Comments: The catheter in place. No drainage or leakage at urethral orifice. Distal connection to bag appears damaged. Musculoskeletal:         General: Normal range of motion. Skin:     General: Skin is warm and dry. Findings: No rash. Neurological:      Mental Status: He is alert and oriented to person, place, and time. Psychiatric:         Behavior: Behavior normal.           Diagnostic Study Results     Labs -   No results found for this or any previous visit (from the past 12 hour(s)). Radiologic Studies -   No orders to display     CT Results  (Last 48 hours)    None        CXR Results  (Last 48 hours)    None            Medical Decision Making   I am the first provider for this patient. I reviewed the vital signs, available nursing notes, past medical history, past surgical history, family history and social history. Vital Signs-Reviewed the patient's vital signs. Patient Vitals for the past 12 hrs:   Temp Pulse Resp BP SpO2   04/13/22 1248 97.5 °F (36.4 °C) 74 18 (!) 178/77 95 %           Records Reviewed: Nursing notes reviewed    Provider Notes (Medical Decision Making):   DDX: Damage to Stratton catheter drainage tubing. ED Course:   Initial assessment performed. The patients presenting problems have been discussed, and they are in agreement with the care plan formulated and outlined with them.   I have encouraged them to ask questions as they arise throughout their visit. PROGRESS NOTE    Pt reevaluated. Replaced Stratton connected to drainage bag. Patient otherwise without complaints. Written by Hakeem Vasques MD     Progress note:    Pt noted to be feeling better and ready for discharge. Updated pt and/or family on all final lab and/or  imaging findings. Will follow up as instructed. All questions have been answered, pt voiced understanding and agreement with plan. Specific return precautions provided as well as instructions to return to the ED should sx worsen at any time. Vital signs stable for discharge. I have also put together some discharge instructions for them that include: 1) educational information regarding their diagnosis, 2) how to care for their diagnosis at home, as well a 3) list of reasons why they would want to return to the ED prior to their follow-up appointment, should their condition change. Written by Hakeem Vasques MD        Critical Care Time:   0    Disposition:  Discharge    PLAN:  1. Current Discharge Medication List        2. Follow-up Information     Follow up With Specialties Details Why Contact Info    Damon Duval NP Nurse Practitioner Schedule an appointment as soon as possible for a visit in 1 week  [address] [facility] [address] [phone]      [address] [place] [address] Emergency Medicine Go in 1 day If symptoms worsen [address] [id number]        Return to ED if worse     Diagnosis     Clinical Impression:   1. Malfunction of Stratton catheter, initial encounter [facility])              Please note that this dictation was completed with [employer], the computer voice recognition software. Quite often unanticipated grammatical, syntax, homophones, and other interpretive errors are inadvertently transcribed by the computer software. Please disregard these errors. Please excuse any errors that have escaped final proofreading.

## 2022-04-13 NOTE — ED TRIAGE NOTES
Pt arrived with c/o an unattached galloway securement and ripped bottom strap.  Pt states he has no other complaints at this time and the galloway is working fine

## 2022-06-05 NOTE — PROGRESS NOTES
Thea Ramsey is a 80 y.o. male is here for routine f/u.  S/p STEMI of RCA 8/7/18--transferred to [facility], primary PCI/BARRIE to RCA.  Had transient VT and chris issues early on, doing well post hosp d/c.  LVEF 50%. On ASA,  statin, lisinopril, low dose beta blocker. Brilinta stopped 8/19 one year post MI/PCI.  No current CV sx or complaints. Continues to see PCP     Last seen by Dr Angelika Mack in 12/2021. He is [place]). Did fine with general anesthesia but had some urinary retention post op. He had cystoscopy since. Now seeking cardiac clearance for  green light surgery with Dr. Sheryle Pao. He went to ED 6/1/2022 d/t leg weakness / paresthesia,  Found to be in ventricular bigeminy. Labs ok, trop negative. Sinus tachycardia at 102 beats per minute with frequent PVCs [SG]   1410 EKG 12 lead  Normal sinus rhythm at 90 beats per minute with frequent PVCs in a bigeminy pattern      Today, denies any cardiac symptoms. EKG showed Ventricular Bigeminy. The patient denies chest pain/ shortness of breath, orthopnea, PND, LE edema, palpitations, syncope, presyncope or fatigue. Patient Active Problem List    Diagnosis Date Noted    Dyslipidemia 11/30/2018    Coronary artery disease involving native coronary artery of native heart without angina pectoris 08/22/2018    STEMI involving right coronary artery ([facility] [address] 08/08/2018    Sciatica of left side 03/14/2014    Hypertension 03/14/2014      Moustapha Sykes NP  Past Medical History:   Diagnosis Date    CAD (coronary artery disease)     Hypercholesterolemia     Hypertension     Sciatica     STEMI (ST elevation myocardial infarction) ([address] 08/07/2018    RCA      Past Surgical History:   Procedure Laterality Date    HX BACK SURGERY      HX PTCA  08/07/2018    PTCA/BARRIE RCA (STEMI)     No Known Allergies   History reviewed. No pertinent family history.    Social History     Socioeconomic History    Marital status:      Spouse name: Not on file    Number of children: Not on file    Years of education: Not on file    Highest education level: Not on file   Occupational History    Not on file   Tobacco Use    Smoking status: Former Smoker     Packs/day: 3.00     Years: 20.00     Pack years: 60.00     Types: Cigarettes     Quit date: 1992     Years since quittin.8    Smokeless tobacco: Never Used   Vaping Use    Vaping Use: Never used   Substance and Sexual Activity    Alcohol use: No    Drug use: Never    Sexual activity: Not Currently   Other Topics Concern    Not on file   Social History Narrative    Not on file     Social Determinants of Health     Financial Resource Strain:     Difficulty of Paying Living Expenses: Not on file   Food Insecurity:     Worried About [address] in the Last Year: Not on file    [address] TakeCare in the Last Year: Not on file   Transportation Needs:     Lack of Transportation (Medical): Not on file    Lack of Transportation (Non-Medical):  Not on file   Physical Activity:     Days of Exercise per Week: Not on file    Minutes of Exercise per Session: Not on file   Stress:     Feeling of Stress : Not on file   Social Connections:     Frequency of Communication with Friends and Family: Not on file    Frequency of Social Gatherings with Friends and Family: Not on file    Attends L[employer] day Services: Not on file    Active Member of [address] [place] [address] or Organizations: Not on file    Attends Club or Organization Meetings: Not on file    Marital Status: Not on file   Intimate Partner Violence:     Fear of Current or Ex-Partner: Not on file    Emotionally Abused: Not on file    Physically Abused: Not on file    Sexually Abused: Not on file   Housing Stability:     Unable to Pay for Housing in the Last Year: Not on file    Number of [place] in the Last Year: Not on file    Unstable Housing in the Last Year: Not on file      Current Outpatient Medications   Medication Sig    metoprolol tartrate (LOPRESSOR) 25 mg tablet Take 0.5 Tablets by mouth two (2) times a day.  nitroglycerin (NITROSTAT) 0.4 mg SL tablet 1 Tablet by SubLINGual route every five (5) minutes as needed for Chest Pain (max 3 tabs. if chest pain not relieved by 3rd tab go to hospital emergently).  aspirin delayed-release 81 mg tablet TAKE 1 TABLET BY MOUTH EVERY DAY    acetaminophen (TYLENOL PO) Take  by mouth as needed.  cyclobenzaprine (FLEXERIL) 10 mg tablet Take 1 Tablet by mouth three (3) times daily as needed for Muscle Spasm(s).  ergocalciferol (ERGOCALCIFEROL) 1,250 mcg (50,000 unit) capsule Take 50,000 Units by mouth.  zinc acetate 50 mg (zinc) cap Take 1 Capsule by mouth daily.  cholecalciferol, vitamin D3, (VITAMIN D3 PO) Take 2 Tablets by mouth daily.  atorvastatin (LIPITOR) 40 mg tablet TAKE 1 TABLET BY MOUTH EVERY EVENING    lisinopriL (PRINIVIL, ZESTRIL) 20 mg tablet TAKE 1 TABLET BY MOUTH EVERY DAY     No current facility-administered medications for this visit. Review of Symptoms:    CONST  No weight change. No fever, chills, sweats    ENT No visual changes, URI sx, sore throat    CV  See HPI   RESP  No cough, or sputum, wheezing. Also see HPI   GI  No abdominal pain or change in bowel habits. No heartburn or dysphagia. No melena or rectal bleeding.   No dysuria, urgency, frequency, hematuria   MSKEL  No joint pain, swelling. +back pain, sciatica   SKIN  No rash or lesions. NEURO  No headache, syncope, or seizure. No weakness, loss of sensation, or paresthesias. PSYCH  No low mood or depression  No anxiety. HE/LYMPH  No easy bruising, abnormal bleeding, or enlarged glands.         Physical ExamPhysical Exam:    Visit Vitals  BP (!) 160/80 (BP 1 Location: Left arm, BP Patient Position: Sitting, BP Cuff Size: Adult) Comment: Recheck   Pulse 85 Comment: MARIYA   Temp 97.2 °F (36.2 °C) (Temporal)   Resp 20   Ht 5' 9\" (1.753 m)   Wt 155 lb (70.3 kg)   SpO2 98% Comment: MARIYA BMI 22.89 kg/m²     Gen: NAD  HEENT:  PERRL, throat clear  Neck: no adenopathy, no thyromegaly, no JVD   Heart:  sl irregular,Nl S1S2,  no murmur, gallop or rub. Lungs:  clear  Abdomen:   Soft, non-tender, bowel sounds are active.    Extremities:  No edema  Pulse: symmetric  Neuro: A&O times 3, No focal neuro deficits    Cardiographics    ECG: Ventricular bigeminy    Labs:   Lab Results   Component Value Date/Time    Sodium 142 08/10/2018 03:02 AM    Sodium 142 08/09/2018 05:19 AM    Sodium 141 08/08/2018 02:01 AM    Sodium 139 08/07/2018 10:30 PM    Potassium 3.7 08/10/2018 03:02 AM    Potassium 3.8 08/09/2018 05:19 AM    Potassium 4.0 08/08/2018 02:01 AM    Potassium 3.5 08/07/2018 10:30 PM    Chloride 110 (H) 08/10/2018 03:02 AM    Chloride 111 (H) 08/09/2018 05:19 AM    Chloride 110 (H) 08/08/2018 02:01 AM    Chloride 103 08/07/2018 10:30 PM    CO2 25 08/10/2018 03:02 AM    CO2 25 08/09/2018 05:19 AM    CO2 23 08/08/2018 02:01 AM    CO2 24 08/07/2018 10:30 PM    Anion gap 7 08/10/2018 03:02 AM    Anion gap 6 08/09/2018 05:19 AM    Anion gap 8 08/08/2018 02:01 AM    Anion gap 12 08/07/2018 10:30 PM    Glucose 94 08/10/2018 03:02 AM    Glucose 94 08/09/2018 05:19 AM    Glucose 133 (H) 08/08/2018 02:01 AM    Glucose 206 (H) 08/07/2018 10:30 PM    BUN 19 08/10/2018 03:02 AM    BUN 17 08/09/2018 05:19 AM    BUN 16 08/08/2018 02:01 AM    BUN 16 08/07/2018 10:30 PM    Creatinine 0.85 08/10/2018 03:02 AM    Creatinine 0.88 08/09/2018 05:19 AM    Creatinine 0.81 08/08/2018 02:01 AM    Creatinine 1.08 08/07/2018 10:30 PM    BUN/Creatinine ratio 22 (H) 08/10/2018 03:02 AM    BUN/Creatinine ratio 19 08/09/2018 05:19 AM    BUN/Creatinine ratio 20 08/08/2018 02:01 AM    BUN/Creatinine ratio 15 08/07/2018 10:30 PM    GFR est AA >60 08/10/2018 03:02 AM    GFR est AA >60 08/09/2018 05:19 AM    GFR est AA >60 08/08/2018 02:01 AM    GFR est AA >60 08/07/2018 10:30 PM    GFR est non-AA >60 08/10/2018 03:02 AM    GFR est non-AA >60 08/09/2018 05:19 AM    GFR est non-AA >60 08/08/2018 02:01 AM    GFR est non-AA >60 08/07/2018 10:30 PM    Calcium 9.5 08/10/2018 03:02 AM    Calcium 9.4 08/09/2018 05:19 AM    Calcium 9.1 08/08/2018 02:01 AM    Calcium 10.3 (H) 08/07/2018 10:30 PM    Bilirubin, total 0.3 08/08/2018 02:01 AM    Bilirubin, total 0.3 08/07/2018 10:30 PM    Alk. phosphatase 76 08/08/2018 02:01 AM    Alk. phosphatase 94 08/07/2018 10:30 PM    Protein, total 6.2 (L) 08/08/2018 02:01 AM    Protein, total 7.8 08/07/2018 10:30 PM    Albumin 2.9 (L) 08/08/2018 02:01 AM    Albumin 3.7 08/07/2018 10:30 PM    Globulin 3.3 08/08/2018 02:01 AM    Globulin 4.1 (H) 08/07/2018 10:30 PM    A-G Ratio 0.9 (L) 08/08/2018 02:01 AM    A-G Ratio 0.9 (L) 08/07/2018 10:30 PM    ALT (SGPT) 28 08/08/2018 02:01 AM    ALT (SGPT) 23 08/07/2018 10:30 PM     No results found for: CPK, CPKX, CPX  Lab Results   Component Value Date/Time    Cholesterol, total 123 08/09/2018 05:19 AM    HDL Cholesterol 31 08/09/2018 05:19 AM    LDL, calculated 67.4 08/09/2018 05:19 AM    Triglyceride 123 08/09/2018 05:19 AM    CHOL/HDL Ratio 4.0 08/09/2018 05:19 AM     No results found for this or any previous visit. Assessment:         Patient Active Problem List    Diagnosis Date Noted    Dyslipidemia 11/30/2018    Coronary artery disease involving native coronary artery of native heart without angina pectoris 08/22/2018    STEMI involving right coronary artery ([facility] [address] 08/08/2018    Sciatica of left side 03/14/2014    Hypertension 03/14/2014        Plan:     CAD  S/p STEMI of RCA 8/7/18--transferred to [facility], primary PCI/BARRIE to RCA.    Had transient VT and chris issues early on, doing well post hosp d/c.  LVEF 50%.   On ASA, statin  Will start low dose BB (rhett zimmerman)    Mild LV dysfunction  EF 50% mild MR per echo 10/18 at time of STEMI  Check echo    HTN  Starting low dose BB  Stable kidney fxn Serum Cr 1.01 in 6/2022    HLD  10/2021 LDL 79 on atorva 40 Labs and lipids per PCP    Pre-op clearane for urological procedure  Frequent PVCs noted in previous EKGs (12/2021). Tolerated higher risk surgery (s/p  L2 & L3 Lumbar Hemilaminectomy w/ discectomy 3/23/2022) under general anesthesia (GA). May hold ASA 5-7 days prior to procedure, resume soon deemed safe from surgical standpoint. The patient is at acceptable risk to proceed with the procedure under GA, but should continue perioperative beta-blocker.         Continue current care and f/u in 4-6 mos      Radha Brian NP

## 2022-06-08 ENCOUNTER — OFFICE VISIT (OUTPATIENT)
Dept: CARDIOLOGY CLINIC | Age: 82
End: 2022-06-08
Payer: MEDICARE

## 2022-06-08 VITALS
HEART RATE: 85 BPM | HEIGHT: 69 IN | WEIGHT: 155 LBS | DIASTOLIC BLOOD PRESSURE: 80 MMHG | RESPIRATION RATE: 20 BRPM | SYSTOLIC BLOOD PRESSURE: 160 MMHG | TEMPERATURE: 97.2 F | OXYGEN SATURATION: 98 % | BODY MASS INDEX: 22.96 KG/M2

## 2022-06-08 DIAGNOSIS — I49.3 PVC'S (PREMATURE VENTRICULAR CONTRACTIONS): ICD-10-CM

## 2022-06-08 DIAGNOSIS — Z98.61 S/P PTCA (PERCUTANEOUS TRANSLUMINAL CORONARY ANGIOPLASTY): ICD-10-CM

## 2022-06-08 DIAGNOSIS — I49.8 VENTRICULAR BIGEMINY: ICD-10-CM

## 2022-06-08 DIAGNOSIS — Z01.818 PREOPERATIVE CLEARANCE: ICD-10-CM

## 2022-06-08 DIAGNOSIS — I25.10 CORONARY ARTERY DISEASE INVOLVING NATIVE CORONARY ARTERY OF NATIVE HEART WITHOUT ANGINA PECTORIS: Primary | ICD-10-CM

## 2022-06-08 DIAGNOSIS — I10 ESSENTIAL HYPERTENSION: ICD-10-CM

## 2022-06-08 DIAGNOSIS — E78.5 DYSLIPIDEMIA: ICD-10-CM

## 2022-06-08 DIAGNOSIS — I21.11 STEMI INVOLVING RIGHT CORONARY ARTERY (HCC): ICD-10-CM

## 2022-06-08 PROCEDURE — G8754 DIAS BP LESS 90: HCPCS | Performed by: NURSE PRACTITIONER

## 2022-06-08 PROCEDURE — 93000 ELECTROCARDIOGRAM COMPLETE: CPT | Performed by: INTERNAL MEDICINE

## 2022-06-08 PROCEDURE — G8427 DOCREV CUR MEDS BY ELIG CLIN: HCPCS | Performed by: NURSE PRACTITIONER

## 2022-06-08 PROCEDURE — 99214 OFFICE O/P EST MOD 30 MIN: CPT | Performed by: NURSE PRACTITIONER

## 2022-06-08 PROCEDURE — G8420 CALC BMI NORM PARAMETERS: HCPCS | Performed by: NURSE PRACTITIONER

## 2022-06-08 PROCEDURE — 1101F PT FALLS ASSESS-DOCD LE1/YR: CPT | Performed by: NURSE PRACTITIONER

## 2022-06-08 PROCEDURE — G8432 DEP SCR NOT DOC, RNG: HCPCS | Performed by: NURSE PRACTITIONER

## 2022-06-08 PROCEDURE — G8753 SYS BP > OR = 140: HCPCS | Performed by: NURSE PRACTITIONER

## 2022-06-08 PROCEDURE — 1123F ACP DISCUSS/DSCN MKR DOCD: CPT | Performed by: NURSE PRACTITIONER

## 2022-06-08 PROCEDURE — G8536 NO DOC ELDER MAL SCRN: HCPCS | Performed by: NURSE PRACTITIONER

## 2022-06-08 RX ORDER — METOPROLOL TARTRATE 25 MG/1
12.5 TABLET, FILM COATED ORAL 2 TIMES DAILY
Qty: 90 TABLET | Refills: 1 | Status: SHIPPED | OUTPATIENT
Start: 2022-06-08

## 2022-06-08 NOTE — PROGRESS NOTES
Identified pt with two pt identifiers(name and ). Reviewed record in preparation for visit and have obtained necessary documentation. Chief Complaint   Patient presents with    Surgical Clearance     follow up and surgical clearance    Coronary Artery Disease    Hypertension    Cholesterol Problem      Vitals:    22 1355 22 1410   BP: (!) 162/86 (!) 160/80   Pulse: 85    Resp: 20    Temp: 97.2 °F (36.2 °C)    TempSrc: Temporal    SpO2: 98%    Weight: 155 lb (70.3 kg)    Height: 5' 9\" (1.753 m)    PainSc:   0 - No pain        Medications reviewed/approved by provider. Health Maintenance Review: Patient reminded of \"due or due soon\" health maintenance. I have asked the patient to contact his/her primary care provider (PCP) for follow-up on his/her health maintenance. Coordination of Care Questionnaire:  :   1) Have you been to an emergency room, urgent care, or hospitalized since your last visit? If yes, where when, and reason for visit? yes [facility]       2. Have seen or consulted any other health care provider since your last visit? If yes, where when, and reason for visit? YES [facility]      Patient is accompanied by self I have received verbal consent from Fredy Mora to discuss any/all medical information while they are present in the room.

## 2022-06-09 ENCOUNTER — TELEPHONE (OUTPATIENT)
Dept: CARDIOLOGY CLINIC | Age: 82
End: 2022-06-09

## 2022-06-09 NOTE — TELEPHONE ENCOUNTER
----- Message from Zay Taylor NP sent at 6/9/2022  1:43 PM EDT -----  Have him do echo prior to clearance

## 2022-06-10 NOTE — TELEPHONE ENCOUNTER
Spoke with Dusty Nunn from [facility] regarding the status of Mr. Sunitha Mondragon cardiac clearance. I explained per Beckie Mtz NP that she conversed with Dr. Yue Carr and was going to go ahead and clear him for surgery. She originally wanted the echo done prior because pt would be under genreal anesthesia. Rosalia Cooley both agreed that he would be ok to go ahead and get the echo done at his convenience. Contacted his daughter Ned Faulkner who is listed on his PHI regarding the clearance status. They were able to get an echo scheduled for 6/17 and will keep that apt. Daughter verbalized understanding and was going to pass that information along to her father.

## 2022-06-17 ENCOUNTER — HOSPITAL ENCOUNTER (OUTPATIENT)
Dept: ULTRASOUND IMAGING | Age: 82
Discharge: HOME | End: 2022-06-17
Attending: NURSE PRACTITIONER
Payer: MEDICARE

## 2022-06-17 DIAGNOSIS — E78.5 DYSLIPIDEMIA: ICD-10-CM

## 2022-06-17 DIAGNOSIS — I49.3 PVC'S (PREMATURE VENTRICULAR CONTRACTIONS): ICD-10-CM

## 2022-06-17 DIAGNOSIS — I25.10 CORONARY ARTERY DISEASE INVOLVING NATIVE CORONARY ARTERY OF NATIVE HEART WITHOUT ANGINA PECTORIS: ICD-10-CM

## 2022-06-17 DIAGNOSIS — Z98.61 S/P PTCA (PERCUTANEOUS TRANSLUMINAL CORONARY ANGIOPLASTY): ICD-10-CM

## 2022-06-17 DIAGNOSIS — I21.11 STEMI INVOLVING RIGHT CORONARY ARTERY (HCC): ICD-10-CM

## 2022-06-17 DIAGNOSIS — I10 ESSENTIAL HYPERTENSION: ICD-10-CM

## 2022-06-17 DIAGNOSIS — Z01.818 PREOPERATIVE CLEARANCE: ICD-10-CM

## 2022-06-17 PROCEDURE — 93306 TTE W/DOPPLER COMPLETE: CPT

## 2022-06-21 LAB
ECHO AO ROOT DIAM: 3.5 CM
ECHO AV PEAK GRADIENT: 7 MMHG
ECHO AV PEAK VELOCITY: 1.3 M/S
ECHO EST RA PRESSURE: 10 MMHG
ECHO LA DIAMETER: 3.7 CM
ECHO LA TO AORTIC ROOT RATIO: 1.06
ECHO LA VOL 2C: 47 ML (ref 18–58)
ECHO LA VOL 4C: 40 ML (ref 18–58)
ECHO LA VOLUME AREA LENGTH: 47 ML
ECHO LV E' SEPTAL VELOCITY: 6 CM/S
ECHO LV EDV A2C: 125 ML
ECHO LV EDV A4C: 116 ML
ECHO LV EDV BP: 120 ML (ref 67–155)
ECHO LV EJECTION FRACTION A2C: 57 %
ECHO LV EJECTION FRACTION A4C: 47 %
ECHO LV EJECTION FRACTION BIPLANE: 50 % (ref 55–100)
ECHO LV ESV A2C: 53 ML
ECHO LV ESV A4C: 62 ML
ECHO LV ESV BP: 60 ML (ref 22–58)
ECHO LV FRACTIONAL SHORTENING: 21 % (ref 28–44)
ECHO LV INTERNAL DIMENSION DIASTOLIC: 5.6 CM (ref 4.2–5.9)
ECHO LV INTERNAL DIMENSION SYSTOLIC: 4.4 CM
ECHO LV IVSD: 1.1 CM (ref 0.6–1)
ECHO LV MASS 2D: 249.3 G (ref 88–224)
ECHO LV POSTERIOR WALL DIASTOLIC: 1.1 CM (ref 0.6–1)
ECHO LV RELATIVE WALL THICKNESS RATIO: 0.39
ECHO LVOT AREA: 4.5 CM2
ECHO LVOT DIAM: 2.4 CM
ECHO MV A VELOCITY: 0.94 M/S
ECHO MV E DECELERATION TIME (DT): 296.5 MS
ECHO MV E VELOCITY: 0.65 M/S
ECHO MV E/A RATIO: 0.69
ECHO MV E/E' SEPTAL: 10.83
ECHO MV EROA CONT EQ: 0.1 CM2
ECHO MV REGURGITANT ALIASING (NYQUIST) VELOCITY: 30 CM/S
ECHO MV REGURGITANT FRACTION PISA: 0.5 %
ECHO MV REGURGITANT VELOCITY PISA: 6 M/S
ECHO MV REGURGITANT VTIA: 216 CM
ECHO RIGHT VENTRICULAR SYSTOLIC PRESSURE (RVSP): 30 MMHG
ECHO TV REGURGITANT MAX VELOCITY: 2.21 M/S
ECHO TV REGURGITANT PEAK GRADIENT: 20 MMHG

## 2022-06-21 PROCEDURE — 93306 TTE W/DOPPLER COMPLETE: CPT | Performed by: INTERNAL MEDICINE

## 2022-06-22 NOTE — PROGRESS NOTES
Heart pumping strength about the same, at 45-50%. Mild-moderate leakage / stiffness across mitral valve. Continue metoprolol,  recommend switching from Lisinopril to Entresto to help improve heart function. If willing, will send rx to pharmacy,  check BMP in 1 mos and echo in 3 mos from starting therapy.     If not willing to switch, then continue metoprolol and lisinopril, keep September appointment

## 2022-06-30 ENCOUNTER — TELEPHONE (OUTPATIENT)
Dept: CARDIOLOGY CLINIC | Age: 82
End: 2022-06-30

## 2022-06-30 RX ORDER — SACUBITRIL AND VALSARTAN 24; 26 MG/1; MG/1
1 TABLET, FILM COATED ORAL 2 TIMES DAILY
Qty: 180 TABLET | Refills: 1 | Status: SHIPPED | OUTPATIENT
Start: 2022-06-30

## 2022-06-30 NOTE — TELEPHONE ENCOUNTER
----- Message from Saji Casiano NP sent at 6/22/2022 11:05 AM EDT -----  Heart pumping strength about the same, at 45-50%. Mild-moderate leakage / stiffness across mitral valve. Continue metoprolol,  recommend switching from Lisinopril to Entresto to help improve heart function. If willing, will send rx to pharmacy,  check BMP in 1 mos and echo in 3 mos from starting therapy. If not willing to switch, then continue metoprolol and lisinopril, keep September appointment    Spoke with the patients daughter, Vinicius Bergeron. Verified patient with two patient identifiers. Results given and questions answered. Pts dtr would like to send in [facility] so they can know the cost (unsure completely if pt has commercial or medicare part D). Vinicius Bergeron will call back with decision on entresto if pt wants to take before send ing in Lab order and echo in 3 month. Patients daughter verbalized understanding.

## 2022-07-01 DIAGNOSIS — I25.10 ATHEROSCLEROSIS OF NATIVE CORONARY ARTERY OF NATIVE HEART WITHOUT ANGINA PECTORIS: ICD-10-CM

## 2022-07-01 DIAGNOSIS — I42.9 CARDIOMYOPATHY, UNSPECIFIED TYPE (HCC): Primary | ICD-10-CM

## 2022-07-01 DIAGNOSIS — I25.10 ASHD (ARTERIOSCLEROTIC HEART DISEASE): ICD-10-CM

## 2022-07-01 NOTE — TELEPHONE ENCOUNTER
----- Message from John Fitzgerald NP sent at 6/22/2022 11:05 AM EDT -----  Heart pumping strength about the same, at 45-50%. Mild-moderate leakage / stiffness across mitral valve. Continue metoprolol,  recommend switching from Lisinopril to Entresto to help improve heart function. If willing, will send rx to pharmacy,  check BMP in 1 mos and echo in 3 mos from starting therapy.     If not willing to switch, then continue metoprolol and lisinopril, keep September appointment

## 2022-07-01 NOTE — TELEPHONE ENCOUNTER
S/w pts dtr, Jose Parsons. Verified patient with two patient identifiers. Pt picked up entresto ($47). He will start 36 hrs after his last lisinipril dose. In addition the following info was given to Jose Parsons. Lab to check kidney function in 1 month post starting entesto. Echocardiogram in 3 months to reevaluate his ejection fraction. ALL tests can be performed at [facility]. Central scheduling is suppose to call to arrange echo. Please call 4 [phone] if you receive a delay. No appt needed for labs at [facility].

## 2022-08-05 ENCOUNTER — PATIENT MESSAGE (OUTPATIENT)
Dept: CARDIOLOGY CLINIC | Age: 82
End: 2022-08-05

## 2022-08-05 DIAGNOSIS — I49.3 PVC'S (PREMATURE VENTRICULAR CONTRACTIONS): Primary | ICD-10-CM

## 2022-08-05 DIAGNOSIS — R00.1 BRADYCARDIA: ICD-10-CM

## 2022-08-08 ENCOUNTER — HOSPITAL ENCOUNTER (OUTPATIENT)
Dept: LAB | Age: 82
Discharge: HOME | End: 2022-08-08
Payer: MEDICARE

## 2022-08-08 LAB
ANION GAP SERPL CALC-SCNC: 8 MMOL/L (ref 5–15)
BUN SERPL-MCNC: 14 MG/DL (ref 6–20)
BUN/CREAT SERPL: 14 (ref 12–20)
CALCIUM SERPL-MCNC: 10 MG/DL (ref 8.5–10.1)
CHLORIDE SERPL-SCNC: 107 MMOL/L (ref 97–108)
CO2 SERPL-SCNC: 27 MMOL/L (ref 21–32)
CREAT SERPL-MCNC: 0.97 MG/DL (ref 0.7–1.3)
GLUCOSE SERPL-MCNC: 117 MG/DL (ref 65–100)
POTASSIUM SERPL-SCNC: 3.8 MMOL/L (ref 3.5–5.1)
SODIUM SERPL-SCNC: 142 MMOL/L (ref 136–145)

## 2022-08-08 PROCEDURE — 80048 BASIC METABOLIC PNL TOTAL CA: CPT

## 2022-08-08 PROCEDURE — 36415 COLL VENOUS BLD VENIPUNCTURE: CPT

## 2022-08-08 NOTE — TELEPHONE ENCOUNTER
----- Message from Xochitl Duran NP sent at 8/8/2022  3:25 PM EDT -----  Saw him 6/8/2022--I restarted low dose metoprolol --- 12.5 mg BID not 25 mg BID  So that's the first step--make sure he is taking the medication correctly. I restarted this for cardiomyopathy and also  due to fast HR/ Bigeminy during ED visit on 6/1/2022    Sinus tachycardia at 102 beats per minute with frequent PVCs [SG]   1410 EKG 12 lead  Normal sinus rhythm at 90 beats per minute with frequent PVCs in a bigeminy pattern    If he is taking this correctly and still have issues,  need to get 48 hr holter to rule out Sick sinus. This nurse called the pts daughter, Yulissa Elizabeth. Verified patient with two patient identifiers. She fills his medication box and confirms that he is taking metoprolol 12.5 mg BID. No changes with Entresto or other cardiac medications. Patient did have his ears irrigated last week and that helped the dizziness per Yulissa Elizabeth. Informed Yulissa Elizabeth that I do not have a holter in inventory and I have reached back out to [employer] regarding. I also attempted to reach Crit with [facility] to see if she could apply a holter tomorrow but she has left for the day. I will contact her tomorrow.   (Pt is leaving for vacation this Sat.)

## 2022-08-08 NOTE — TELEPHONE ENCOUNTER
Forwarded the following [employer] to Adan Flood NP:    \"This is Toro Ruel, daughter of Geneva Rosales. I have taken my Dad to his general doctor for  several issues. His pulse is low - his visit this week was 42 and the week before it was 52. I know this office prescribed him Metoprolol Tartrate 25MG. Is this a side affect of the medication. I know he is scheduled for blood work which will be done this upcoming week of 08/08. Just a little concerned. He feels weak and drained - lifeless he calls it. Thank you for your feed back. \"    Viewed his care everywhere. SX's of confusion and dizziness developed mid July. Dimas Paredes was started by cardiology at the end of June.     PCP Vitals:   08/04/22 1354   BP: 152/80   Pulse: (!) 49   Resp: 16   Temp: 97.3 °F (36.3 °C)

## 2022-08-09 ENCOUNTER — TELEPHONE (OUTPATIENT)
Dept: CARDIOLOGY CLINIC | Age: 82
End: 2022-08-09

## 2022-08-09 NOTE — TELEPHONE ENCOUNTER
Unable to coordinate a sooner holter application at [facility]. Asuncion Nuñez promises a shipment at the [facility] location: 8/10    Proactively scheduled pt for 8/10 / PM for holter application. S/W Chelsea Landeros regarding. Verified patient with two patient identifiers.

## 2022-08-09 NOTE — TELEPHONE ENCOUNTER
----- Message from Berta Osorio NP sent at 8/8/2022  4:33 PM EDT -----  Stable kidney fxn,  lytes ok. Will await holter result.

## 2022-08-09 NOTE — TELEPHONE ENCOUNTER
Spoke with the patients dtr. Verified patient with two patient identifiers. Results given and questions answered. Patients dtr verbalized understanding.

## 2022-08-22 ENCOUNTER — CLINICAL SUPPORT (OUTPATIENT)
Dept: CARDIOLOGY CLINIC | Age: 82
End: 2022-08-22
Payer: MEDICARE

## 2022-08-22 DIAGNOSIS — R00.1 BRADYCARDIA: Primary | ICD-10-CM

## 2022-08-22 PROCEDURE — 93225 XTRNL ECG REC<48 HRS REC: CPT | Performed by: INTERNAL MEDICINE

## 2022-08-22 PROCEDURE — 93227 XTRNL ECG REC<48 HR R&I: CPT | Performed by: INTERNAL MEDICINE

## 2022-09-12 ENCOUNTER — TELEPHONE (OUTPATIENT)
Dept: CARDIOLOGY CLINIC | Age: 82
End: 2022-09-12

## 2022-09-12 NOTE — TELEPHONE ENCOUNTER
Preliminary [employer] report received from Nic Flood. Report given to Dr. Lucas Oconnor for his review.

## 2022-09-12 NOTE — PROGRESS NOTES
Beth Walsh is a 80 y.o. male is here for routine f/u. S/p STEMI of RCA 8/7/18--transferred to [facility], primary PCI/BARRIE to RCA. Had transient VT and chris issues early on, doing well post hosp d/c. LVEF 50%. On ASA,  statin, lisinopril, low dose beta blocker. Brilinta stopped 8/19 one year post MI/PCI. No current CV sx or complaints. Continues to see PCP     Last seen by Dr Arie Hussein in 12/2021. Seen by me in 6/8/2022  Did fine with general anesthesia but had some urinary retention post op. He had cystoscopy since. Now seeking cardiac clearance for  green light surgery with Dr. Tasha Howard. He went to ED 6/1/2022 d/t leg weakness / paresthesia,  Found to be in ventricular bigeminy. Labs ok, trop negative. Sinus tachycardia at 102 beats per minute with frequent PVCs [SG]   1410 EKG 12 lead  Normal sinus rhythm at 90 beats per minute with frequent PVCs in a bigeminy pattern     EKG showed Ventricular Bigeminy. At this visit --I restarted low dose metoprolol --- 12.5 mg BID for bigeminy, ST, CM. Pt took 25 mg BID. Daughter called around August that pt's HR was in the 45s. Obtained 48 hr holter and showed AIVR. Today Recommend NST which he is refusing today as he feels well and no exertional symptoms, did well post surgery. The patient denies chest pain/ shortness of breath, orthopnea, PND, LE edema, palpitations, syncope, presyncope or fatigue.        Patient Active Problem List    Diagnosis Date Noted    Dyslipidemia 11/30/2018    Coronary artery disease involving native coronary artery of native heart without angina pectoris 08/22/2018    STEMI involving right coronary artery ([facility] [address] 08/08/2018    Sciatica of left side 03/14/2014    Hypertension 03/14/2014      Alicia Sykes NP  Past Medical History:   Diagnosis Date    CAD (coronary artery disease)     Hypercholesterolemia     Hypertension     Sciatica     STEMI (ST elevation myocardial infarction) ([facility] [address] 08/07/2018    RCA Past Surgical History:   Procedure Laterality Date    HX BACK SURGERY      HX PTCA  2018    PTCA/BARRIE RCA (STEMI)     No Known Allergies   History reviewed. No pertinent family history. Social History     Socioeconomic History    Marital status:      Spouse name: Not on file    Number of children: Not on file    Years of education: Not on file    Highest education level: Not on file   Occupational History    Not on file   Tobacco Use    Smoking status: Former     Packs/day: 3.00     Years: 20.00     Pack years: 60.00     Types: Cigarettes     Quit date: 1992     Years since quittin.0    Smokeless tobacco: Never   Vaping Use    Vaping Use: Never used   Substance and Sexual Activity    Alcohol use: No    Drug use: Never    Sexual activity: Not Currently   Other Topics Concern    Not on file   Social History Narrative    Not on file     Social Determinants of Health     Financial Resource Strain: Not on file   Food Insecurity: Not on file   Transportation Needs: Not on file   Physical Activity: Not on file   Stress: Not on file   Social Connections: Not on file   Intimate Partner Violence: Not on file   Housing Stability: Not on file      Current Outpatient Medications   Medication Sig    sacubitriL-valsartan (Entresto) 24-26 mg tablet Take 1 Tablet by mouth two (2) times a day. Stop Lisinopril. Take first dose of entresto 36 hrs AFTER last dose of lisinopril    metoprolol tartrate (LOPRESSOR) 25 mg tablet Take 0.5 Tablets by mouth two (2) times a day. nitroglycerin (NITROSTAT) 0.4 mg SL tablet 1 Tablet by SubLINGual route every five (5) minutes as needed for Chest Pain (max 3 tabs. if chest pain not relieved by 3rd tab go to hospital emergently). aspirin delayed-release 81 mg tablet TAKE 1 TABLET BY MOUTH EVERY DAY    acetaminophen (TYLENOL PO) Take  by mouth as needed.     cyclobenzaprine (FLEXERIL) 10 mg tablet Take 1 Tablet by mouth three (3) times daily as needed for Muscle Spasm(s). ergocalciferol (ERGOCALCIFEROL) 1,250 mcg (50,000 unit) capsule Take 50,000 Units by mouth. zinc acetate 50 mg (zinc) cap Take 1 Capsule by mouth daily. cholecalciferol, vitamin D3, (VITAMIN D3 PO) Take 2 Tablets by mouth daily. atorvastatin (LIPITOR) 40 mg tablet TAKE 1 TABLET BY MOUTH EVERY EVENING     No current facility-administered medications for this visit. Review of Symptoms:    CONST  No weight change. No fever, chills, sweats    ENT No visual changes, URI sx, sore throat    CV  See HPI   RESP  No cough, or sputum, wheezing. Also see HPI   GI  No abdominal pain or change in bowel habits. No heartburn or dysphagia. No melena or rectal bleeding.   No dysuria, urgency, frequency, hematuria   MSKEL  No joint pain, swelling. +back pain, sciatica   SKIN  No rash or lesions. NEURO  No headache, syncope, or seizure. No weakness, loss of sensation, or paresthesias. PSYCH  No low mood or depression  No anxiety. HE/LYMPH  No easy bruising, abnormal bleeding, or enlarged glands. Physical ExamPhysical Exam:    Visit Vitals  /68 (BP 1 Location: Left upper arm, BP Patient Position: Sitting, BP Cuff Size: Adult)   Pulse 60   Temp 97 °F (36.1 °C) (Temporal)   Resp 20   Ht 5' 9\" (1.753 m)   Wt 147 lb (66.7 kg)   SpO2 97% Comment: RA   BMI 21.71 kg/m²       Gen: NAD  HEENT:  PERRL, throat clear  Neck: no adenopathy, no thyromegaly, no JVD   Heart:  sl irregular,Nl S1S2,  no murmur, gallop or rub. Lungs:  clear  Abdomen:   Soft, non-tender, bowel sounds are active.    Extremities:  No edema  Pulse: symmetric  Neuro: A&O times 3, No focal neuro deficits    Cardiographics    ECG: Ventricular bigeminy    Labs:   Lab Results   Component Value Date/Time    Sodium 142 08/08/2022 04:01 PM    Sodium 142 08/10/2018 03:02 AM    Sodium 142 08/09/2018 05:19 AM    Sodium 141 08/08/2018 02:01 AM    Sodium 139 08/07/2018 10:30 PM    Potassium 3.8 08/08/2022 04:01 PM Potassium 3.7 08/10/2018 03:02 AM    Potassium 3.8 08/09/2018 05:19 AM    Potassium 4.0 08/08/2018 02:01 AM    Potassium 3.5 08/07/2018 10:30 PM    Chloride 107 08/08/2022 04:01 PM    Chloride 110 (H) 08/10/2018 03:02 AM    Chloride 111 (H) 08/09/2018 05:19 AM    Chloride 110 (H) 08/08/2018 02:01 AM    Chloride 103 08/07/2018 10:30 PM    CO2 27 08/08/2022 04:01 PM    CO2 25 08/10/2018 03:02 AM    CO2 25 08/09/2018 05:19 AM    CO2 23 08/08/2018 02:01 AM    CO2 24 08/07/2018 10:30 PM    Anion gap 8 08/08/2022 04:01 PM    Anion gap 7 08/10/2018 03:02 AM    Anion gap 6 08/09/2018 05:19 AM    Anion gap 8 08/08/2018 02:01 AM    Anion gap 12 08/07/2018 10:30 PM    Glucose 117 (H) 08/08/2022 04:01 PM    Glucose 94 08/10/2018 03:02 AM    Glucose 94 08/09/2018 05:19 AM    Glucose 133 (H) 08/08/2018 02:01 AM    Glucose 206 (H) 08/07/2018 10:30 PM    BUN 14 08/08/2022 04:01 PM    BUN 19 08/10/2018 03:02 AM    BUN 17 08/09/2018 05:19 AM    BUN 16 08/08/2018 02:01 AM    BUN 16 08/07/2018 10:30 PM    Creatinine 0.97 08/08/2022 04:01 PM    Creatinine 0.85 08/10/2018 03:02 AM    Creatinine 0.88 08/09/2018 05:19 AM    Creatinine 0.81 08/08/2018 02:01 AM    Creatinine 1.08 08/07/2018 10:30 PM    BUN/Creatinine ratio 14 08/08/2022 04:01 PM    BUN/Creatinine ratio 22 (H) 08/10/2018 03:02 AM    BUN/Creatinine ratio 19 08/09/2018 05:19 AM    BUN/Creatinine ratio 20 08/08/2018 02:01 AM    BUN/Creatinine ratio 15 08/07/2018 10:30 PM    GFR est AA >60 08/08/2022 04:01 PM    GFR est AA >60 08/10/2018 03:02 AM    GFR est AA >60 08/09/2018 05:19 AM    GFR est AA >60 08/08/2018 02:01 AM    GFR est AA >60 08/07/2018 10:30 PM    GFR est non-AA >60 08/08/2022 04:01 PM    GFR est non-AA >60 08/10/2018 03:02 AM    GFR est non-AA >60 08/09/2018 05:19 AM    GFR est non-AA >60 08/08/2018 02:01 AM    GFR est non-AA >60 08/07/2018 10:30 PM    Calcium 10.0 08/08/2022 04:01 PM    Calcium 9.5 08/10/2018 03:02 AM    Calcium 9.4 08/09/2018 05:19 AM    Calcium 9.1 08/08/2018 02:01 AM    Calcium 10.3 (H) 08/07/2018 10:30 PM    Bilirubin, total 0.3 08/08/2018 02:01 AM    Bilirubin, total 0.3 08/07/2018 10:30 PM    Alk. phosphatase 76 08/08/2018 02:01 AM    Alk. phosphatase 94 08/07/2018 10:30 PM    Protein, total 6.2 (L) 08/08/2018 02:01 AM    Protein, total 7.8 08/07/2018 10:30 PM    Albumin 2.9 (L) 08/08/2018 02:01 AM    Albumin 3.7 08/07/2018 10:30 PM    Globulin 3.3 08/08/2018 02:01 AM    Globulin 4.1 (H) 08/07/2018 10:30 PM    A-G Ratio 0.9 (L) 08/08/2018 02:01 AM    A-G Ratio 0.9 (L) 08/07/2018 10:30 PM    ALT (SGPT) 28 08/08/2018 02:01 AM    ALT (SGPT) 23 08/07/2018 10:30 PM     No results found for: CPK, CPKX, CPX  Lab Results   Component Value Date/Time    Cholesterol, total 123 08/09/2018 05:19 AM    HDL Cholesterol 31 08/09/2018 05:19 AM    LDL, calculated 67.4 08/09/2018 05:19 AM    Triglyceride 123 08/09/2018 05:19 AM    CHOL/HDL Ratio 4.0 08/09/2018 05:19 AM     No results found for this or any previous visit. Assessment:         Patient Active Problem List    Diagnosis Date Noted    Dyslipidemia 11/30/2018    Coronary artery disease involving native coronary artery of native heart without angina pectoris 08/22/2018    STEMI involving right coronary artery ([facility] [address] 08/08/2018    Sciatica of left side 03/14/2014    Hypertension 03/14/2014 06/17/22    ECHO ADULT COMPLETE 06/21/2022 6/21/2022    Interpretation Summary  Formatting of this result is different from the original.      Left Ventricle: Mildly reduced left ventricular systolic function with a visually estimated EF of 45 - 50%. EF by 2D Simpsons Biplane is 50%. Left ventricle size is normal. Mildly increased wall thickness. Mild global hypokinesis present. Normal diastolic function for age. Mitral Valve: Mild to moderate regurgitation with a posterior directed jet.     Signed by: Bhakti Tsai MD on 6/21/2022  5:42 PM         Plan:     CAD  S/p STEMI of RCA 8/7/18--transferred to [facility], primary PCI/BARRIE to RCA. Had transient VT and chris issues early on, doing well post hosp d/c. LVEF 50%.   On ASA, statin  Continue low dose BB (rhett bigeminy) last OV 6/2022    Bradycardia  Obtained 48 hr holter 9/2022 and showed AIVR  Recommended NST but wants to hold off for now    Mild LV dysfunction  LVEF 45-50% mild-mod MR per echo 6/2022  EF 50% mild MR per echo 10/18 at time of STEMI  Continue BB (started 6/8/2022  Continue Entresto 24/26 mg BID (started 6/30/2022)  Repeat echo end of 10/2022    HTN  Controlled with current therapy  Stable kidney fxn Serum Cr 0.97 in 8/2022    HLD  10/2021 LDL 79 on atorva 40 Labs and lipids per PCP    Continue current care and f/u in when echo complete      Saji Casiano, ISHA

## 2022-09-13 ENCOUNTER — TELEPHONE (OUTPATIENT)
Dept: CARDIOLOGY CLINIC | Age: 82
End: 2022-09-13

## 2022-09-14 NOTE — TELEPHONE ENCOUNTER
Please advise some abnormal heart rhythms (called AIVR) that sometimes indicate blood flow issues to the arteries of the heart. Please schedule for Lexiscan Myoview to check on the blood flow to the heart. I expect to find an old scar from heart attack, but hopefully no active blood flow problems. Continue current medications. If any spells of fainting or near fainting, I would recommend that he needs to see electrophysiology at [facility]. Please let us know if any such symptoms.

## 2022-09-15 ENCOUNTER — OFFICE VISIT (OUTPATIENT)
Dept: CARDIOLOGY CLINIC | Age: 82
End: 2022-09-15
Payer: MEDICARE

## 2022-09-15 VITALS
DIASTOLIC BLOOD PRESSURE: 68 MMHG | WEIGHT: 147 LBS | TEMPERATURE: 97 F | BODY MASS INDEX: 21.77 KG/M2 | SYSTOLIC BLOOD PRESSURE: 128 MMHG | HEART RATE: 60 BPM | RESPIRATION RATE: 20 BRPM | HEIGHT: 69 IN | OXYGEN SATURATION: 97 %

## 2022-09-15 DIAGNOSIS — E78.5 DYSLIPIDEMIA: ICD-10-CM

## 2022-09-15 DIAGNOSIS — I49.3 PVC'S (PREMATURE VENTRICULAR CONTRACTIONS): ICD-10-CM

## 2022-09-15 DIAGNOSIS — I25.10 CORONARY ARTERY DISEASE INVOLVING NATIVE CORONARY ARTERY OF NATIVE HEART WITHOUT ANGINA PECTORIS: Primary | ICD-10-CM

## 2022-09-15 DIAGNOSIS — I21.11 STEMI INVOLVING RIGHT CORONARY ARTERY (HCC): ICD-10-CM

## 2022-09-15 DIAGNOSIS — I10 ESSENTIAL HYPERTENSION: ICD-10-CM

## 2022-09-15 DIAGNOSIS — Z98.61 S/P PTCA (PERCUTANEOUS TRANSLUMINAL CORONARY ANGIOPLASTY): ICD-10-CM

## 2022-09-15 DIAGNOSIS — R00.1 BRADYCARDIA: ICD-10-CM

## 2022-09-15 DIAGNOSIS — I34.0 MITRAL VALVE INSUFFICIENCY, UNSPECIFIED ETIOLOGY: ICD-10-CM

## 2022-09-15 PROCEDURE — 1101F PT FALLS ASSESS-DOCD LE1/YR: CPT | Performed by: NURSE PRACTITIONER

## 2022-09-15 PROCEDURE — G8432 DEP SCR NOT DOC, RNG: HCPCS | Performed by: NURSE PRACTITIONER

## 2022-09-15 PROCEDURE — G8752 SYS BP LESS 140: HCPCS | Performed by: NURSE PRACTITIONER

## 2022-09-15 PROCEDURE — 1123F ACP DISCUSS/DSCN MKR DOCD: CPT | Performed by: NURSE PRACTITIONER

## 2022-09-15 PROCEDURE — 99214 OFFICE O/P EST MOD 30 MIN: CPT | Performed by: NURSE PRACTITIONER

## 2022-09-15 PROCEDURE — G8536 NO DOC ELDER MAL SCRN: HCPCS | Performed by: NURSE PRACTITIONER

## 2022-09-15 PROCEDURE — G8420 CALC BMI NORM PARAMETERS: HCPCS | Performed by: NURSE PRACTITIONER

## 2022-09-15 PROCEDURE — G8427 DOCREV CUR MEDS BY ELIG CLIN: HCPCS | Performed by: NURSE PRACTITIONER

## 2022-09-15 PROCEDURE — G8754 DIAS BP LESS 90: HCPCS | Performed by: NURSE PRACTITIONER

## 2022-09-15 NOTE — PROGRESS NOTES
Identified pt with two pt identifiers(name and ). Reviewed record in preparation for visit and have obtained necessary documentation. Chief Complaint   Patient presents with    Coronary Artery Disease    Hypertension    Cholesterol Problem      Vitals:    09/15/22 1446   BP: 128/68   Pulse: 60   Resp: 20   Temp: 97 °F (36.1 °C)   TempSrc: Temporal   SpO2: 97%   Weight: 147 lb (66.7 kg)   Height: 5' 9\" (1.753 m)   PainSc:   0 - No pain       Medications reviewed/approved by provider. Health Maintenance Review: Patient reminded of \"due or due soon\" health maintenance. I have asked the patient to contact his/her primary care provider (PCP) for follow-up on his/her health maintenance. Coordination of Care Questionnaire:  :   1) Have you been to an emergency room, urgent care, or hospitalized since your last visit? If yes, where when, and reason for visit? no       2. Have seen or consulted any other health care provider since your last visit? If yes, where when, and reason for visit? NO      Patient is accompanied by self I have received verbal consent from Gianna Dean to discuss any/all medical information while they are present in the room.

## 2022-10-03 ENCOUNTER — HOSPITAL ENCOUNTER (OUTPATIENT)
Dept: ULTRASOUND IMAGING | Age: 82
Discharge: HOME | End: 2022-10-03
Attending: NURSE PRACTITIONER
Payer: MEDICARE

## 2022-10-03 DIAGNOSIS — I42.9 CARDIOMYOPATHY, UNSPECIFIED TYPE (HCC): ICD-10-CM

## 2022-10-03 DIAGNOSIS — I25.10 ASHD (ARTERIOSCLEROTIC HEART DISEASE): ICD-10-CM

## 2022-10-03 PROCEDURE — 93306 TTE W/DOPPLER COMPLETE: CPT

## 2022-10-06 LAB
ECHO AO ROOT DIAM: 3.3 CM
ECHO AV PEAK GRADIENT: 6 MMHG
ECHO AV PEAK VELOCITY: 1.2 M/S
ECHO EST RA PRESSURE: 10 MMHG
ECHO LA DIAMETER: 3.5 CM
ECHO LA TO AORTIC ROOT RATIO: 1.06
ECHO LA VOL 2C: 57 ML (ref 18–58)
ECHO LA VOL 4C: 44 ML (ref 18–58)
ECHO LA VOLUME AREA LENGTH: 56 ML
ECHO LV E' SEPTAL VELOCITY: 6 CM/S
ECHO LV EJECTION FRACTION A2C: 45 %
ECHO LV EJECTION FRACTION A4C: 51 %
ECHO LV FRACTIONAL SHORTENING: 29 % (ref 28–44)
ECHO LV INTERNAL DIMENSION DIASTOLIC: 5.8 CM (ref 4.2–5.9)
ECHO LV INTERNAL DIMENSION SYSTOLIC: 4.1 CM
ECHO LV IVSD: 1 CM (ref 0.6–1)
ECHO LV MASS 2D: 233.1 G (ref 88–224)
ECHO LV POSTERIOR WALL DIASTOLIC: 1 CM (ref 0.6–1)
ECHO LV RELATIVE WALL THICKNESS RATIO: 0.34
ECHO LVOT AREA: 4.2 CM2
ECHO LVOT CARDIAC OUTPUT: 2.9 LITER/MINUTE
ECHO LVOT CARDIAC OUTPUT: 3 LITER/MINUTE
ECHO LVOT CARDIAC OUTPUT: 3 LITER/MINUTE
ECHO LVOT DIAM: 2.3 CM
ECHO MV A VELOCITY: 0.85 M/S
ECHO MV E DECELERATION TIME (DT): 228.1 MS
ECHO MV E VELOCITY: 0.64 M/S
ECHO MV E/A RATIO: 0.75
ECHO MV E/E' SEPTAL: 10.67
ECHO MV REGURGITANT ALIASING (NYQUIST) VELOCITY: 30 CM/S
ECHO MV REGURGITANT VELOCITY PISA: 5.6 M/S
ECHO MV REGURGITANT VTIA: 236.8 CM
ECHO RIGHT VENTRICULAR SYSTOLIC PRESSURE (RVSP): 28 MMHG
ECHO TV REGURGITANT MAX VELOCITY: 2.11 M/S
ECHO TV REGURGITANT PEAK GRADIENT: 18 MMHG

## 2022-10-06 PROCEDURE — 93306 TTE W/DOPPLER COMPLETE: CPT | Performed by: INTERNAL MEDICINE

## 2022-10-07 NOTE — PROGRESS NOTES
Good news! His echo showed improved heart pumping strength from 45% to now 55%. Mild leakage across mitral valve but stable. Call if any sob or wt gain 2-3 lbs overnight.   Continue same meds for now
PROVIDER:[TOKEN:[[id number]],FOLLOWUP:[1-3 Days]]

## 2022-10-12 ENCOUNTER — TELEPHONE (OUTPATIENT)
Dept: CARDIOLOGY CLINIC | Age: 82
End: 2022-10-12

## 2022-10-12 NOTE — TELEPHONE ENCOUNTER
----- Message from Kenna Chapin LPN sent at 69/04/3797  2:24 PM EDT -----    ----- Message -----  From: Latonya Larson NP  Sent: 10/7/2022   9:13 AM EDT  To: Kenna Chapin LPN    Good news! His echo showed improved heart pumping strength from 45% to now 55%. Mild leakage across mitral valve but stable. Call if any sob or wt gain 2-3 lbs overnight.   Continue same meds for now

## 2022-11-25 RX ORDER — METOPROLOL TARTRATE 25 MG/1
TABLET, FILM COATED ORAL
Qty: 90 TABLET | Refills: 1 | Status: SHIPPED | OUTPATIENT
Start: 2022-11-25

## 2022-12-16 ENCOUNTER — OFFICE VISIT (OUTPATIENT)
Dept: CARDIOLOGY CLINIC | Age: 82
End: 2022-12-16
Payer: MEDICARE

## 2022-12-16 VITALS
BODY MASS INDEX: 22.22 KG/M2 | DIASTOLIC BLOOD PRESSURE: 90 MMHG | HEIGHT: 69 IN | WEIGHT: 150 LBS | RESPIRATION RATE: 22 BRPM | TEMPERATURE: 98.1 F | OXYGEN SATURATION: 98 % | SYSTOLIC BLOOD PRESSURE: 140 MMHG | HEART RATE: 52 BPM

## 2022-12-16 DIAGNOSIS — Z98.61 S/P PTCA (PERCUTANEOUS TRANSLUMINAL CORONARY ANGIOPLASTY): ICD-10-CM

## 2022-12-16 DIAGNOSIS — E78.5 DYSLIPIDEMIA: ICD-10-CM

## 2022-12-16 DIAGNOSIS — I25.10 CORONARY ARTERY DISEASE INVOLVING NATIVE CORONARY ARTERY OF NATIVE HEART WITHOUT ANGINA PECTORIS: Primary | ICD-10-CM

## 2022-12-16 DIAGNOSIS — I49.8 VENTRICULAR BIGEMINY: ICD-10-CM

## 2022-12-16 DIAGNOSIS — I49.3 PVC'S (PREMATURE VENTRICULAR CONTRACTIONS): ICD-10-CM

## 2022-12-16 DIAGNOSIS — I10 ESSENTIAL HYPERTENSION: ICD-10-CM

## 2022-12-16 DIAGNOSIS — I21.11 STEMI INVOLVING RIGHT CORONARY ARTERY (HCC): ICD-10-CM

## 2022-12-16 NOTE — LETTER
1/1/2023    Patient: Agustin Landry   YOB: 1940   Date of Visit: 12/16/2022     Darren Graham NP  [address]  Via Fax: [phone]    Dear Darren Graham NP,      Thank you for referring Mr. Ryan Lin to [facility] for evaluation. My notes for this consultation are attached. If you have questions, please do not hesitate to call me. I look forward to following your patient along with you.       Sincerely,    Joy Pettit MD

## 2022-12-16 NOTE — PROGRESS NOTES
Identified pt with two pt identifiers(name and ). Reviewed record in preparation for visit and have obtained necessary documentation. Chief Complaint   Patient presents with    Coronary Artery Disease     6 month follow up    Cholesterol Problem    Hypertension      Vitals:    22 1358   BP: (!) 140/90   Pulse: (!) 52   Resp: 22   Temp: 98.1 °F (36.7 °C)   TempSrc: Temporal   SpO2: 98%   Weight: 150 lb (68 kg)   Height: 5' 9\" (1.753 m)   PainSc:   0 - No pain       Medications reviewed/approved by provider. Health Maintenance Review: Patient reminded of \"due or due soon\" health maintenance. I have asked the patient to contact his/her primary care provider (PCP) for follow-up on his/her health maintenance. Coordination of Care Questionnaire:  :   1) Have you been to an emergency room, urgent care, or hospitalized since your last visit? If yes, where when, and reason for visit? no       2. Have seen or consulted any other health care provider since your last visit? If yes, where when, and reason for visit? NO      Patient is accompanied by self I have received verbal consent from Amanda Kevin to discuss any/all medical information while they are present in the room.

## 2022-12-16 NOTE — PROGRESS NOTES
[address], [address] [place] [address]  [phone]    [address] [place] [address] Way      Subjective:      Kvng Carr is a 80 y.o. male is here for routine f/u. The patient was last seen by us in 2022. Repeat echo was ordered at that time which showed normalization of LVEF. See results below. Today, the patient denies chest pain/ shortness of breath, orthopnea, PND, LE edema, palpitations, syncope, or presyncope. Patient Active Problem List    Diagnosis Date Noted    Dyslipidemia 2018    Coronary artery disease involving native coronary artery of native heart without angina pectoris 2018    STEMI involving right coronary artery ([facility] [address] 2018    Sciatica of left side 2014    Hypertension 2014      Carmen Sykes NP  Past Medical History:   Diagnosis Date    CAD (coronary artery disease)     Hypercholesterolemia     Hypertension     Sciatica     STEMI (ST elevation myocardial infarction) ([facility] [address] 2018    RCA      Past Surgical History:   Procedure Laterality Date    HX BACK SURGERY      HX PTCA  2018    PTCA/BARRIE RCA (STEMI)     No Known Allergies   No family history on file.    Social History     Socioeconomic History    Marital status:      Spouse name: Not on file    Number of children: Not on file    Years of education: Not on file    Highest education level: Not on file   Occupational History    Not on file   Tobacco Use    Smoking status: Former     Packs/day: 3.00     Years: 20.00     Pack years: 60.00     Types: Cigarettes     Quit date: 1992     Years since quittin.3    Smokeless tobacco: Never   Vaping Use    Vaping Use: Never used   Substance and Sexual Activity    Alcohol use: No    Drug use: Never    Sexual activity: Not Currently   Other Topics Concern    Not on file   Social History Narrative    Not on file     Social Determinants of Health     Financial Resource Strain: Not on file   Food Insecurity: Not on file   Transportation Needs: Not on file   Physical Activity: Not on file   Stress: Not on file   Social Connections: Not on file   Intimate Partner Violence: Not on file   Housing Stability: Not on file      Current Outpatient Medications   Medication Sig    metoprolol tartrate (LOPRESSOR) 25 mg tablet TAKE 1/2 TABLET (12.5MG) BY MOUTH TWICE DAILY    sacubitriL-valsartan (Entresto) 24-26 mg tablet Take 1 Tablet by mouth two (2) times a day. Stop Lisinopril. Take first dose of entresto 36 hrs AFTER last dose of lisinopril    nitroglycerin (NITROSTAT) 0.4 mg SL tablet 1 Tablet by SubLINGual route every five (5) minutes as needed for Chest Pain (max 3 tabs. if chest pain not relieved by 3rd tab go to hospital emergently). aspirin delayed-release 81 mg tablet TAKE 1 TABLET BY MOUTH EVERY DAY    acetaminophen (TYLENOL PO) Take  by mouth as needed. cyclobenzaprine (FLEXERIL) 10 mg tablet Take 1 Tablet by mouth three (3) times daily as needed for Muscle Spasm(s). ergocalciferol (ERGOCALCIFEROL) 1,250 mcg (50,000 unit) capsule Take 50,000 Units by mouth. zinc acetate 50 mg (zinc) cap Take 1 Capsule by mouth daily. cholecalciferol, vitamin D3, (VITAMIN D3 PO) Take 2 Tablets by mouth daily. atorvastatin (LIPITOR) 40 mg tablet TAKE 1 TABLET BY MOUTH EVERY EVENING     No current facility-administered medications for this visit. Review of Symptoms:  11 systems reviewed, negative other than as stated in the HPI    Physical ExamPhysical Exam:    Vitals:    12/16/22 1358   BP: (!) 140/90   Pulse: (!) 52   Resp: 22   Temp: 98.1 °F (36.7 °C)   TempSrc: Temporal   SpO2: 98%   Weight: 150 lb (68 kg)   Height: 5' 9\" (1.753 m)     Body mass index is 22.15 kg/m². General PE  Gen:  NAD  Mental Status - Alert. General Appearance - Not in acute distress. HEENT:  PERRL, no carotid bruits or JVD  Chest and Lung Exam   Inspection: Accessory muscles - No use of accessory muscles in breathing. Auscultation:   Breath sounds: - Normal.   Cardiovascular   Inspection: Jugular vein - Bilateral - Inspection Normal.   Palpation/Percussion:   Apical Impulse: - Normal.   Auscultation: Rhythm - Regular. Heart Sounds - S1 WNL and S2 WNL. No S3 or S4. Murmurs & Other Heart Sounds: Auscultation of the heart reveals - No Murmurs. Peripheral Vascular   Upper Extremity: Inspection - Bilateral - No Cyanotic nailbeds or Digital clubbing. Lower Extremity:   Palpation: Edema - Bilateral - No edema. Abdomen:   Soft, non-tender, bowel sounds are active. Neuro: A&O times 3, CN and motor grossly WNL    Labs:   Lab Results   Component Value Date/Time    Cholesterol, total 123 08/09/2018 05:19 AM    HDL Cholesterol 31 08/09/2018 05:19 AM    LDL, calculated 67.4 08/09/2018 05:19 AM    Triglyceride 123 08/09/2018 05:19 AM    CHOL/HDL Ratio 4.0 08/09/2018 05:19 AM     No results found for: CPK, CPKX, CPX  Lab Results   Component Value Date/Time    Sodium 142 08/08/2022 04:01 PM    Potassium 3.8 08/08/2022 04:01 PM    Chloride 107 08/08/2022 04:01 PM    CO2 27 08/08/2022 04:01 PM    Anion gap 8 08/08/2022 04:01 PM    Glucose 117 (H) 08/08/2022 04:01 PM    BUN 14 08/08/2022 04:01 PM    Creatinine 0.97 08/08/2022 04:01 PM    BUN/Creatinine ratio 14 08/08/2022 04:01 PM    GFR est AA >60 08/08/2022 04:01 PM    GFR est non-AA >60 08/08/2022 04:01 PM    Calcium 10.0 08/08/2022 04:01 PM    Bilirubin, total 0.3 08/08/2018 02:01 AM    Alk. phosphatase 76 08/08/2018 02:01 AM    Protein, total 6.2 (L) 08/08/2018 02:01 AM    Albumin 2.9 (L) 08/08/2018 02:01 AM    Globulin 3.3 08/08/2018 02:01 AM    A-G Ratio 0.9 (L) 08/08/2018 02:01 AM    ALT (SGPT) 28 08/08/2018 02:01 AM       EKG:  NSR with ventricular bigeminy    10/03/22    ECHO ADULT COMPLETE 10/06/2022 10/6/2022    Interpretation Summary    Left Ventricle: Low normal left ventricular systolic function with a visually estimated EF of 50 - 55%.  Left ventricle size is normal. Normal wall thickness. Normal wall motion. Normal diastolic function for age. Mitral Valve: Mild regurgitation. Signed by: Joanna Calix MD on 10/6/2022  9:22 PM      10/03/22    ECHO ADULT COMPLETE 10/06/2022 10/6/2022    Interpretation Summary    Left Ventricle: Low normal left ventricular systolic function with a visually estimated EF of 50 - 55%. Left ventricle size is normal. Normal wall thickness. Normal wall motion. Normal diastolic function for age. Mitral Valve: Mild regurgitation. Signed by: Joanna Calix MD on 10/6/2022  9:22 PM           Assessment:          ICD-10-CM ICD-9-CM    1. Coronary artery disease involving native coronary artery of native heart without angina pectoris  I25.10 414.01       2. S/P PTCA (percutaneous transluminal coronary angioplasty)  Z98.61 V45.82       3. PVC's (premature ventricular contractions)  I49.3 427.69       4. STEMI involving right coronary artery (HCC)  I21.11 410.31       5. Essential hypertension  I10 401.9       6. Dyslipidemia  E78.5 272.4       7. Ventricular bigeminy  I49.8 427.89           No orders of the defined types were placed in this encounter. Plan:     CAD  S/p STEMI of RCA 8/7/18--transferred to [facility], primary PCI/BARRIE to RCA. Had transient VT and chris issues early on, doing well post hosp d/c. LVEF 50% October 2022.   On ASA, statin  Continue low dose BB (rhett bigeminy)     Bradycardia  Obtained 48 hr holter 9/2022 and showed AIVR  Recommended NST but wants to hold off for now     Mild LV dysfunction  LVEF 45-50% mild-mod MR per echo 6/2022  EF 50% mild MR per echo 10/18 at time of STEMI  Continue BB (started 6/8/2022  Continue Entresto 24/26 mg BID (started 6/30/2022)  Repeat echo 10/2022 shows normal LVEF, 50 to 55%     HTN  Controlled with current therapy  Stable kidney fxn Serum Cr 0.97 in 8/2022  Lab Results   Component Value Date/Time    Creatinine 0.97 08/08/2022 04:01 PM         HLD  10/2021 LDL 79   on atorva 40 Labs and lipids per PCP     Continue current care and f/u in 6 months.     Martha Webber MD

## 2023-01-02 RX ORDER — SACUBITRIL AND VALSARTAN 24; 26 MG/1; MG/1
TABLET, FILM COATED ORAL
Qty: 180 TABLET | Refills: 1 | Status: SHIPPED | OUTPATIENT
Start: 2023-01-02

## 2023-02-14 RX ORDER — ASPIRIN 81 MG/1
TABLET ORAL
Qty: 90 TABLET | Refills: 0 | Status: SHIPPED | OUTPATIENT
Start: 2023-02-14

## 2023-02-14 NOTE — TELEPHONE ENCOUNTER
PCP: Agustín Arteaga NP    Last appt: 12/16/2022  Future Appointments   Date Time Provider Florentin Brothers   6/21/2023  2:20 PM Linette Godfrey, ISHA [facility] AMB       Requested Prescriptions     Signed Prescriptions Disp Refills    aspirin delayed-release 81 mg tablet 90 Tablet 0     Sig: TAKE 1 TABLET BY MOUTH EVERY DAY     Authorizing Provider: Francis Bess     Ordering User: ADRIÁN CADET         Other Comments:  Verbal order per provider. Order (medication, dose, route, frequency, amount, refills) repeated and verified twice.

## 2023-05-15 RX ORDER — ASPIRIN 81 MG/1
TABLET ORAL
Qty: 90 TABLET | Refills: 0 | Status: SHIPPED | OUTPATIENT
Start: 2023-05-15

## 2023-05-15 NOTE — TELEPHONE ENCOUNTER
PCP: BETO Wills - NP    Last appt: 12/16/2022   Future Appointments   Date Time Provider Reginald Conklin   6/21/2023  2:00 PM Karle Osgood, MD [facility] AMB       Requested Prescriptions     Signed Prescriptions Disp Refills    ASPIRIN ADULT LOW STRENGTH 81 MG EC tablet 90 tablet 0     Sig: TAKE 1 TABLET BY MOUTH EVERY DAY     Authorizing Provider: Miley Merrill     Ordering User: STORMY RUDOLPH         Other Comments:  Verbal order per provider. Order (medication, dose, route, frequency, amount, refills) repeated and verified twice.

## 2023-05-30 NOTE — TELEPHONE ENCOUNTER
PCP: Gita Geronimo, APRN - NP    Last appt: 12/16/2022   Future Appointments   Date Time Provider Reginald Conklin   6/21/2023  2:00 PM Ayad Marie MD [facility] AMB       Requested Prescriptions     Signed Prescriptions Disp Refills    metoprolol tartrate (LOPRESSOR) 25 MG tablet 90 tablet 0     Sig: TAKE 1/2 TABLET (=12.5MG) BY MOUTH TWICE DAILY     Authorizing Provider: Alphonso Casey     Ordering User: STORMY RUDOLPH         Other Comments:  Verbal order per provider. Order (medication, dose, route, frequency, amount, refills) repeated and verified twice.

## 2023-06-16 PROBLEM — I25.5 ISCHEMIC CARDIOMYOPATHY: Status: ACTIVE | Noted: 2023-06-16

## 2023-06-16 PROBLEM — I49.3 PVCS (PREMATURE VENTRICULAR CONTRACTIONS): Status: ACTIVE | Noted: 2023-06-16

## 2023-06-16 PROBLEM — E78.00 HYPERCHOLESTEROLEMIA: Status: ACTIVE | Noted: 2018-11-30

## 2023-07-24 RX ORDER — SACUBITRIL AND VALSARTAN 24; 26 MG/1; MG/1
1 TABLET, FILM COATED ORAL 2 TIMES DAILY
Qty: 60 TABLET | Refills: 0 | Status: SHIPPED | OUTPATIENT
Start: 2023-07-24

## 2023-07-24 NOTE — TELEPHONE ENCOUNTER
PCP: Waqas Levy, APRN - NP    Last appt: 12/16/2022   Future Appointments   Date Time Provider [address]   7/31/2023  3:20 PM Timothy Gonzalez MD [facility] AMB       Requested Prescriptions     Signed Prescriptions Disp Refills    sacubitril-valsartan (ENTRESTO) 24-26 MG per tablet 60 tablet 0     Sig: Take 1 tablet by mouth 2 times daily     Authorizing Provider: Natalia Claude     Ordering User: STORMY Ding         Other Comments:  Verbal order per provider. Order (medication, dose, route, frequency, amount, refills) repeated and verified twice.

## 2023-07-31 ENCOUNTER — OFFICE VISIT (OUTPATIENT)
Age: 83
End: 2023-07-31
Payer: MEDICARE

## 2023-07-31 VITALS
WEIGHT: 164 LBS | HEART RATE: 49 BPM | RESPIRATION RATE: 26 BRPM | HEIGHT: 69 IN | DIASTOLIC BLOOD PRESSURE: 80 MMHG | SYSTOLIC BLOOD PRESSURE: 140 MMHG | TEMPERATURE: 98.6 F | BODY MASS INDEX: 24.29 KG/M2 | OXYGEN SATURATION: 97 %

## 2023-07-31 DIAGNOSIS — I25.5 ISCHEMIC CARDIOMYOPATHY: ICD-10-CM

## 2023-07-31 DIAGNOSIS — E78.00 HYPERCHOLESTEROLEMIA: ICD-10-CM

## 2023-07-31 DIAGNOSIS — I10 PRIMARY HYPERTENSION: ICD-10-CM

## 2023-07-31 DIAGNOSIS — I49.3 PVCS (PREMATURE VENTRICULAR CONTRACTIONS): ICD-10-CM

## 2023-07-31 DIAGNOSIS — I25.10 CORONARY ARTERY DISEASE INVOLVING NATIVE CORONARY ARTERY OF NATIVE HEART WITHOUT ANGINA PECTORIS: Primary | ICD-10-CM

## 2023-07-31 PROCEDURE — 1123F ACP DISCUSS/DSCN MKR DOCD: CPT | Performed by: INTERNAL MEDICINE

## 2023-07-31 PROCEDURE — 99214 OFFICE O/P EST MOD 30 MIN: CPT | Performed by: INTERNAL MEDICINE

## 2023-07-31 PROCEDURE — 3077F SYST BP >= 140 MM HG: CPT | Performed by: INTERNAL MEDICINE

## 2023-07-31 PROCEDURE — 3079F DIAST BP 80-89 MM HG: CPT | Performed by: INTERNAL MEDICINE

## 2023-07-31 PROCEDURE — 93000 ELECTROCARDIOGRAM COMPLETE: CPT | Performed by: INTERNAL MEDICINE

## 2023-07-31 RX ORDER — CANDESARTAN 4 MG/1
4 TABLET ORAL DAILY
Qty: 30 TABLET | Refills: 5 | Status: SHIPPED | OUTPATIENT
Start: 2023-07-31

## 2023-07-31 RX ORDER — OLMESARTAN MEDOXOMIL 20 MG/1
20 TABLET ORAL DAILY
Qty: 30 TABLET | Refills: 5 | Status: SHIPPED | OUTPATIENT
Start: 2023-07-31 | End: 2023-07-31

## 2023-07-31 RX ORDER — METOPROLOL SUCCINATE 25 MG/1
25 TABLET, EXTENDED RELEASE ORAL DAILY
Qty: 30 TABLET | Refills: 5 | Status: SHIPPED | OUTPATIENT
Start: 2023-07-31

## 2023-07-31 NOTE — PATIENT INSTRUCTIONS
Begin candesartan 4 mg once daily. This will replace the Entresto. Change the metoprolol tartrate to metoprolol succinate 25 mg once daily. Schedule an echocardiogram for reassessment of your heart function.

## 2023-08-03 ENCOUNTER — CLINICAL DOCUMENTATION (OUTPATIENT)
Age: 83
End: 2023-08-03

## 2023-08-03 NOTE — TELEPHONE ENCOUNTER
This printed RX for ENTRESTO 24-26 mg bid is for the [employer] PAF application. Note: candesartan will be D/C'd if patient is approved for the assistance.

## 2023-08-03 NOTE — PROGRESS NOTES
Faxed patients and prescriber's portion of PAF application for [employer]. Application included prescription. Confirmation received.

## 2023-08-15 ENCOUNTER — HOSPITAL ENCOUNTER (OUTPATIENT)
Facility: HOSPITAL | Age: 83
Discharge: HOME | End: 2023-08-18
Attending: INTERNAL MEDICINE
Payer: MEDICARE

## 2023-08-15 DIAGNOSIS — I25.5 ISCHEMIC CARDIOMYOPATHY: ICD-10-CM

## 2023-08-15 LAB
ECHO AO ROOT DIAM: 3.4 CM
ECHO AV PEAK GRADIENT: 6 MMHG
ECHO AV PEAK VELOCITY: 1.2 M/S
ECHO EST RA PRESSURE: 8 MMHG
ECHO LA DIAMETER: 3.9 CM
ECHO LA TO AORTIC ROOT RATIO: 1.15
ECHO LA VOL 2C: 71 ML (ref 18–58)
ECHO LA VOL 2C: 73 ML (ref 18–58)
ECHO LA VOL 4C: 36 ML (ref 18–58)
ECHO LA VOL 4C: 38 ML (ref 18–58)
ECHO LA VOLUME AREA LENGTH: 54 ML
ECHO LV E' LATERAL VELOCITY: 11 CM/S
ECHO LV E' SEPTAL VELOCITY: 6 CM/S
ECHO LV EDV A2C: 141 ML
ECHO LV EDV A4C: 158 ML
ECHO LV EDV BP: 151 ML (ref 67–155)
ECHO LV EJECTION FRACTION A2C: 46 %
ECHO LV EJECTION FRACTION A4C: 47 %
ECHO LV EJECTION FRACTION BIPLANE: 44 % (ref 55–100)
ECHO LV ESV A2C: 77 ML
ECHO LV ESV A4C: 85 ML
ECHO LV ESV BP: 85 ML (ref 22–58)
ECHO LV FRACTIONAL SHORTENING: 27 % (ref 28–44)
ECHO LV INTERNAL DIMENSION DIASTOLIC: 5.9 CM (ref 4.2–5.9)
ECHO LV INTERNAL DIMENSION SYSTOLIC: 4.3 CM
ECHO LV IVSD: 1.1 CM (ref 0.6–1)
ECHO LV MASS 2D: 255.7 G (ref 88–224)
ECHO LV POSTERIOR WALL DIASTOLIC: 1 CM (ref 0.6–1)
ECHO LV RELATIVE WALL THICKNESS RATIO: 0.34
ECHO LVOT AREA: 3.5 CM2
ECHO LVOT DIAM: 2.1 CM
ECHO MV A VELOCITY: 0.86 M/S
ECHO MV E DECELERATION TIME (DT): 210.4 MS
ECHO MV E VELOCITY: 0.66 M/S
ECHO MV E/A RATIO: 0.77
ECHO MV E/E' LATERAL: 6
ECHO MV E/E' RATIO (AVERAGED): 8.5
ECHO MV E/E' SEPTAL: 11
ECHO MV REGURGITANT ALIASING (NYQUIST) VELOCITY: 33 CM/S
ECHO MV REGURGITANT VELOCITY PISA: 5.3 M/S
ECHO MV REGURGITANT VTIA: 215.5 CM
ECHO PULMONARY ARTERY END DIASTOLIC PRESSURE: 3 MMHG
ECHO PULMONARY ARTERY SYSTOLIC PRESSURE (PASP): 31 MMHG
ECHO PV REGURGITANT MAX VELOCITY: 0.9 M/S
ECHO RA AREA 4C: 16.6 CM2
ECHO RIGHT VENTRICULAR SYSTOLIC PRESSURE (RVSP): 31 MMHG
ECHO RV TAPSE: 2.7 CM (ref 1.7–?)
ECHO TV REGURGITANT MAX VELOCITY: 2.41 M/S
ECHO TV REGURGITANT PEAK GRADIENT: 23 MMHG

## 2023-08-15 PROCEDURE — 93306 TTE W/DOPPLER COMPLETE: CPT

## 2023-08-16 NOTE — RESULT ENCOUNTER NOTE
Please inform  Jr that the echocardiogram demonstrated mildly reduced LV function EF 45 to 50%. This is similar to an echo 6/17/2022. There is moderate leakiness of the mitral valve which is slightly greater than on the prior study. We will keep a close eye on this. Follow-up in 6 months as planned. Recommended repeating the echo in a year or sooner if need be.

## 2023-10-17 ENCOUNTER — TELEPHONE (OUTPATIENT)
Age: 83
End: 2023-10-17

## 2023-10-17 NOTE — TELEPHONE ENCOUNTER
This nurse called [facility]. S/W Pedro Fuller. Verified patient with two patient identifiers. Pedro Fuller advised that this pt picked up metoprolol XL on 7/31/23 (30 days) and never returned for any other refills. Pt did not fill the metoprolol tartrate. Advised pt, daughter [facility] that the metoprolol tartrate has been d/c'd and succinate is to be taken. The medication is ready for pt to . Pt, daughter verbalized understanding.

## 2023-10-17 NOTE — TELEPHONE ENCOUNTER
Patient was verified with two patient identifiers. Patient called in stating his Metoprolol Succinate 25mg was denied by the pharmacy. Patient is asking for a call back to see if he was suppose to stop taking it, or if he can get it refilled.     Pharmacy on [facility]

## 2023-11-07 ENCOUNTER — TELEPHONE (OUTPATIENT)
Age: 83
End: 2023-11-07

## 2023-11-07 NOTE — TELEPHONE ENCOUNTER
S/W with Sloan Taylor (pts dtr) regarding renewal of [employer]vart[employer] application for 4424 year. Sloan Taylor advised that the patient portion will be picked up from office. This nurse faxed the provider portion.

## 2023-11-10 RX ORDER — ASPIRIN 81 MG/1
TABLET ORAL
Qty: 90 TABLET | Refills: 3 | Status: SHIPPED | OUTPATIENT
Start: 2023-11-10

## 2023-11-10 NOTE — TELEPHONE ENCOUNTER
PCP: BETO Delgado NP    Last appt: 7/31/2023   Future Appointments   Date Time Provider [address]   1/31/2024  3:20 PM Jaclyn Rogers MD [facility] AMB       Requested Prescriptions     Signed Prescriptions Disp Refills    aspirin (ASPIRIN ADULT LOW STRENGTH) 81 MG EC tablet 90 tablet 3     Sig: TAKE 1 TABLET BY MOUTH EVERY DAY     Authorizing Provider: Jaclyn Rogers     Ordering User: STORMY RUDOLPH         Other Comments:  Verbal order per provider. Order (medication, dose, route, frequency, amount, refills) repeated and verified twice.

## 2024-01-25 PROBLEM — I34.0 NONRHEUMATIC MITRAL VALVE REGURGITATION: Status: ACTIVE | Noted: 2024-01-25

## 2024-01-25 NOTE — PROGRESS NOTES
ASSESSMENT and PLAN  1. Coronary artery disease involving native coronary artery of native heart without angina pectoris  Free of angina s/p inferior STEMI with subsequent distal RCA PCI/PAMELA 8/8/2018.  Continue current cardioprotective medications.    2. Ischemic cardiomyopathy  No clinical evidence of decompensation.  His current home medication regimen is unclear.  I instructed him to call back or bring in a list of his current home medications.  I informed him that he should not be on lisinopril or candesartan when on Entresto.    3.  Nonrheumatic mitral valve regurgitation  Moderate MR on echo 8/15/2023 slightly increased from prior study.  Repeat echo prior to the follow-up visit in 6 months for reassessment of LV function and mitral regurgitation.    4. PVCs (premature ventricular contractions)  11% PVC burden on monitor 8/2022.  PVC morphology suggest possible origin from the RVOT.  Asymptomatic. Continue metoprolol.    5. Primary hypertension  Well-controlled.  Continue current medications.    6. Hypercholesterolemia  Labs 6/20/2023 demonstrated total cholesterol 119, triglyceride 256, HDL 27, LDL 41.  Continue atorvastatin as prescribed.       Follow-up in 6 months with an echo just prior to that visit for reevaluation of LV function and MR. The patient has been instructed and agrees to call our office with any issues or other concerns related to their cardiac condition(s) and/or complaint(s).      CHIEF COMPLAINT  Routine follow-up    HPI:    Francisca Norris is a 83 y.o. male here for follow-up of coronary artery disease and ischemic cardiomyopathy.  At the previous visit on 7/31/2022, he wanted to discontinue Entresto due to cost and I replaced it with candesartan 4 mg daily.  I replaced the metoprolol tartrate with metoprolol succinate 25 mg daily. An echo was updated 8/15/2023 demonstrating EF 45-50% with basal-mid inferior inferoseptal and inferolateral hypokinesis, moderate MR and normal right

## 2024-01-31 ENCOUNTER — OFFICE VISIT (OUTPATIENT)
Age: 84
End: 2024-01-31
Payer: MEDICARE

## 2024-01-31 VITALS
SYSTOLIC BLOOD PRESSURE: 144 MMHG | HEART RATE: 72 BPM | RESPIRATION RATE: 18 BRPM | TEMPERATURE: 97.6 F | HEIGHT: 69 IN | WEIGHT: 172 LBS | OXYGEN SATURATION: 98 % | DIASTOLIC BLOOD PRESSURE: 84 MMHG | BODY MASS INDEX: 25.48 KG/M2

## 2024-01-31 DIAGNOSIS — I34.0 NONRHEUMATIC MITRAL VALVE REGURGITATION: ICD-10-CM

## 2024-01-31 DIAGNOSIS — I25.10 CORONARY ARTERY DISEASE INVOLVING NATIVE CORONARY ARTERY OF NATIVE HEART WITHOUT ANGINA PECTORIS: Primary | ICD-10-CM

## 2024-01-31 DIAGNOSIS — I25.5 ISCHEMIC CARDIOMYOPATHY: ICD-10-CM

## 2024-01-31 DIAGNOSIS — I49.3 PVCS (PREMATURE VENTRICULAR CONTRACTIONS): ICD-10-CM

## 2024-01-31 DIAGNOSIS — I10 PRIMARY HYPERTENSION: ICD-10-CM

## 2024-01-31 DIAGNOSIS — E78.00 HYPERCHOLESTEROLEMIA: ICD-10-CM

## 2024-01-31 PROCEDURE — G8484 FLU IMMUNIZE NO ADMIN: HCPCS | Performed by: INTERNAL MEDICINE

## 2024-01-31 PROCEDURE — 1123F ACP DISCUSS/DSCN MKR DOCD: CPT | Performed by: INTERNAL MEDICINE

## 2024-01-31 PROCEDURE — G8428 CUR MEDS NOT DOCUMENT: HCPCS | Performed by: INTERNAL MEDICINE

## 2024-01-31 PROCEDURE — 99214 OFFICE O/P EST MOD 30 MIN: CPT | Performed by: INTERNAL MEDICINE

## 2024-01-31 PROCEDURE — 3079F DIAST BP 80-89 MM HG: CPT | Performed by: INTERNAL MEDICINE

## 2024-01-31 PROCEDURE — 3077F SYST BP >= 140 MM HG: CPT | Performed by: INTERNAL MEDICINE

## 2024-01-31 PROCEDURE — G8419 CALC BMI OUT NRM PARAM NOF/U: HCPCS | Performed by: INTERNAL MEDICINE

## 2024-01-31 PROCEDURE — 1036F TOBACCO NON-USER: CPT | Performed by: INTERNAL MEDICINE

## 2024-01-31 RX ORDER — HYDROCHLOROTHIAZIDE 25 MG/1
25 TABLET ORAL DAILY
COMMUNITY

## 2024-01-31 ASSESSMENT — PATIENT HEALTH QUESTIONNAIRE - PHQ9
1. LITTLE INTEREST OR PLEASURE IN DOING THINGS: 0
SUM OF ALL RESPONSES TO PHQ QUESTIONS 1-9: 0
SUM OF ALL RESPONSES TO PHQ QUESTIONS 1-9: 0
SUM OF ALL RESPONSES TO PHQ9 QUESTIONS 1 & 2: 0
SUM OF ALL RESPONSES TO PHQ QUESTIONS 1-9: 0
SUM OF ALL RESPONSES TO PHQ QUESTIONS 1-9: 0
2. FEELING DOWN, DEPRESSED OR HOPELESS: 0

## 2024-01-31 NOTE — PATIENT INSTRUCTIONS
Please have you or your family contact our office with a list of your current home medications.  Please make sure you are not taking lisinopril or candesartan.  Follow-up in 6 months with an echocardiogram a few days prior to that visit for reevaluation of your heart function and mitral valve.

## 2024-01-31 NOTE — PROGRESS NOTES
Identified pt with two pt identifiers(name and ). Reviewed record in preparation for visit and have obtained necessary documentation.  Chief Complaint   Patient presents with    Coronary Artery Disease     6 month follow up    Cardiomyopathy    Valvular Heart Disease    Irregular Heart Beat    Hypertension    Hyperlipidemia      BP (!) 144/84 (Site: Left Upper Arm, Position: Sitting, Cuff Size: Medium Adult)   Pulse 72   Temp 97.6 °F (36.4 °C) (Temporal)   Resp 18   Ht 1.753 m (5' 9\")   Wt 78 kg (172 lb)   SpO2 98%   BMI 25.40 kg/m²       Medications reviewed/approved by provider.      Health Maintenance Review: Patient reminded of \"due or due soon\" health maintenance. I have asked the patient to contact his/her primary care provider (PCP) for follow-up on his/her health maintenance.    Coordination of Care Questionnaire:  :   1) Have you been to an emergency room, urgent care, or hospitalized since your last visit?  If yes, where when, and reason for visit? no       2. Have seen or consulted any other health care provider since your last visit?   If yes, where when, and reason for visit?  no      Patient is accompanied by self I have received verbal consent from Francisca Norris to discuss any/all medical information while they are present in the room.

## 2024-03-18 RX ORDER — METOPROLOL SUCCINATE 25 MG/1
25 TABLET, EXTENDED RELEASE ORAL DAILY
Qty: 30 TABLET | Refills: 5 | Status: SHIPPED | OUTPATIENT
Start: 2024-03-18

## 2024-03-18 NOTE — TELEPHONE ENCOUNTER
Requested Prescriptions     Signed Prescriptions Disp Refills    metoprolol succinate (TOPROL XL) 25 MG extended release tablet 30 tablet 5     Sig: Take 1 tablet by mouth daily     Authorizing Provider: FREDERICK HOOPER     Ordering User: MARTHA SANCHEZ

## 2024-07-16 ENCOUNTER — HOSPITAL ENCOUNTER (OUTPATIENT)
Facility: HOSPITAL | Age: 84
Discharge: HOME | End: 2024-07-19
Attending: INTERNAL MEDICINE
Payer: MEDICARE

## 2024-07-16 DIAGNOSIS — I25.5 ISCHEMIC CARDIOMYOPATHY: ICD-10-CM

## 2024-07-16 LAB
ECHO AO ASC DIAM: 3.5 CM
ECHO AO ROOT DIAM: 3.4 CM
ECHO AV PEAK GRADIENT: 8 MMHG
ECHO AV PEAK VELOCITY: 1.4 M/S
ECHO EST RA PRESSURE: 3 MMHG
ECHO LA DIAMETER: 3 CM
ECHO LA TO AORTIC ROOT RATIO: 0.88
ECHO LA VOL A-L A2C: 52 ML (ref 18–58)
ECHO LA VOL A-L A4C: 41 ML (ref 18–58)
ECHO LA VOL MOD A2C: 48 ML (ref 18–58)
ECHO LA VOL MOD A4C: 36 ML (ref 18–58)
ECHO LA VOLUME AREA LENGTH: 52 ML
ECHO LV E' LATERAL VELOCITY: 11 CM/S
ECHO LV E' SEPTAL VELOCITY: 9 CM/S
ECHO LV EDV A2C: 109 ML
ECHO LV EDV A4C: 114 ML
ECHO LV EDV BP: 112 ML (ref 67–155)
ECHO LV EJECTION FRACTION A2C: 45 %
ECHO LV EJECTION FRACTION A4C: 51 %
ECHO LV EJECTION FRACTION BIPLANE: 48 % (ref 55–100)
ECHO LV ESV A2C: 59 ML
ECHO LV ESV A4C: 55 ML
ECHO LV ESV BP: 58 ML (ref 22–58)
ECHO LV FRACTIONAL SHORTENING: 24 % (ref 28–44)
ECHO LV INTERNAL DIMENSION DIASTOLIC: 4.9 CM (ref 4.2–5.9)
ECHO LV INTERNAL DIMENSION SYSTOLIC: 3.7 CM
ECHO LV IVSD: 0.8 CM (ref 0.6–1)
ECHO LV MASS 2D: 141.9 G (ref 88–224)
ECHO LV POSTERIOR WALL DIASTOLIC: 0.9 CM (ref 0.6–1)
ECHO LV RELATIVE WALL THICKNESS RATIO: 0.37
ECHO LVOT AREA: 3.5 CM2
ECHO LVOT DIAM: 2.1 CM
ECHO MV A VELOCITY: 0.68 M/S
ECHO MV E DECELERATION TIME (DT): 397 MS
ECHO MV E VELOCITY: 0.43 M/S
ECHO MV E/A RATIO: 0.63
ECHO MV E/E' LATERAL: 3.91
ECHO MV E/E' RATIO (AVERAGED): 4.34
ECHO MV E/E' SEPTAL: 4.78
ECHO PULMONARY ARTERY SYSTOLIC PRESSURE (PASP): 26 MMHG
ECHO RIGHT VENTRICULAR SYSTOLIC PRESSURE (RVSP): 25 MMHG
ECHO RV TAPSE: 2.5 CM (ref 1.7–?)
ECHO TV REGURGITANT MAX VELOCITY: 2.32 M/S
ECHO TV REGURGITANT PEAK GRADIENT: 21 MMHG

## 2024-07-16 PROCEDURE — 93306 TTE W/DOPPLER COMPLETE: CPT

## 2024-07-16 PROCEDURE — 93306 TTE W/DOPPLER COMPLETE: CPT | Performed by: INTERNAL MEDICINE

## 2024-08-01 ENCOUNTER — OFFICE VISIT (OUTPATIENT)
Age: 84
End: 2024-08-01
Payer: MEDICARE

## 2024-08-01 VITALS
TEMPERATURE: 97.4 F | HEART RATE: 50 BPM | DIASTOLIC BLOOD PRESSURE: 64 MMHG | WEIGHT: 165 LBS | SYSTOLIC BLOOD PRESSURE: 160 MMHG | OXYGEN SATURATION: 96 % | BODY MASS INDEX: 24.44 KG/M2 | RESPIRATION RATE: 20 BRPM | HEIGHT: 69 IN

## 2024-08-01 DIAGNOSIS — I25.5 ISCHEMIC CARDIOMYOPATHY: ICD-10-CM

## 2024-08-01 DIAGNOSIS — I10 ESSENTIAL HYPERTENSION: ICD-10-CM

## 2024-08-01 DIAGNOSIS — I49.3 PVCS (PREMATURE VENTRICULAR CONTRACTIONS): ICD-10-CM

## 2024-08-01 DIAGNOSIS — I25.10 CORONARY ARTERY DISEASE INVOLVING NATIVE CORONARY ARTERY OF NATIVE HEART WITHOUT ANGINA PECTORIS: Primary | ICD-10-CM

## 2024-08-01 DIAGNOSIS — E78.00 HYPERCHOLESTEROLEMIA: ICD-10-CM

## 2024-08-01 DIAGNOSIS — I34.0 NONRHEUMATIC MITRAL VALVE REGURGITATION: ICD-10-CM

## 2024-08-01 PROCEDURE — 99214 OFFICE O/P EST MOD 30 MIN: CPT | Performed by: INTERNAL MEDICINE

## 2024-08-01 PROCEDURE — G8420 CALC BMI NORM PARAMETERS: HCPCS | Performed by: INTERNAL MEDICINE

## 2024-08-01 PROCEDURE — 1036F TOBACCO NON-USER: CPT | Performed by: INTERNAL MEDICINE

## 2024-08-01 PROCEDURE — 3078F DIAST BP <80 MM HG: CPT | Performed by: INTERNAL MEDICINE

## 2024-08-01 PROCEDURE — 1123F ACP DISCUSS/DSCN MKR DOCD: CPT | Performed by: INTERNAL MEDICINE

## 2024-08-01 PROCEDURE — G8428 CUR MEDS NOT DOCUMENT: HCPCS | Performed by: INTERNAL MEDICINE

## 2024-08-01 PROCEDURE — 93000 ELECTROCARDIOGRAM COMPLETE: CPT | Performed by: INTERNAL MEDICINE

## 2024-08-01 PROCEDURE — 3077F SYST BP >= 140 MM HG: CPT | Performed by: INTERNAL MEDICINE

## 2024-08-01 NOTE — PROGRESS NOTES
Identified pt with two pt identifiers(name and ). Reviewed record in preparation for visit and have obtained necessary documentation.  Chief Complaint   Patient presents with    Coronary Artery Disease    Valvular Heart Disease    Other     PVC    Hypertension    Cholesterol Problem      BP (!) 160/64 (Site: Left Upper Arm, Position: Sitting, Cuff Size: Medium Adult)   Pulse 50   Temp 97.4 °F (36.3 °C) (Temporal)   Resp 20   Ht 1.753 m (5' 9\")   Wt 74.8 kg (165 lb)   SpO2 96%   BMI 24.37 kg/m²       Medications reviewed/approved by provider.      Health Maintenance Review: Patient reminded of \"due or due soon\" health maintenance. I have asked the patient to contact his/her primary care provider (PCP) for follow-up on his/her health maintenance.    Coordination of Care Questionnaire:  :   1) Have you been to an emergency room, urgent care, or hospitalized since your last visit?  If yes, where when, and reason for visit? no       2. Have seen or consulted any other health care provider since your last visit?   If yes, where when, and reason for visit?  no      Patient is accompanied by daughter I have received verbal consent from Francisca Norris to discuss any/all medical information while they are present in the room.    
31  ==> Echo 2022, EF 45-50%, 1-2+ MR, RV nl, PASP 30  ==> Echo 10/3/2022, EF 50-55%, 1+ MR, RV nl, PASP 28  ==> Echo 8/15/2023, EF 45-50%, basal-mid inferior inferoseptal and inferolateral HK, 2+ MR, RVF nl, PASP 31  ==> Echo 2024, EF 50-55%, basal-mid inferior inferoseptal and inferolateral HK, AV sclerosis, 1+ MR, RV nl, PASP 26    PVCs  ==> 72-hr monitor 2022, HR 40-78 bpm, avg 52 bpm, frequent (11%) PVCs  Bradycardia  Hypertension  Hyperlipidemia  Tobacco abuse, quit    Past medical history, past surgical history, family history, social history, and medications were all reviewed with the patient today and updated as necessary.     Current Outpatient Medications   Medication Sig    metoprolol succinate (TOPROL XL) 25 MG extended release tablet Take 1 tablet by mouth daily    hydroCHLOROthiazide (HYDRODIURIL) 25 MG tablet Take 1 tablet by mouth daily    aspirin (ASPIRIN ADULT LOW STRENGTH) 81 MG EC tablet TAKE 1 TABLET BY MOUTH EVERY DAY    sacubitril-valsartan (ENTRESTO) 24-26 MG per tablet Take 1 tablet by mouth 2 times daily    atorvastatin (LIPITOR) 40 MG tablet Take 1 tablet by mouth nightly    nitroGLYCERIN (NITROSTAT) 0.4 MG SL tablet Place 1 tablet under the tongue     No current facility-administered medications for this visit.     No Known Allergies  Past Medical History:   Diagnosis Date    CAD (coronary artery disease)     Hypercholesterolemia     Hypertension     Sciatica     STEMI (ST elevation myocardial infarction) (HCC) 2018    RCA     Past Surgical History:   Procedure Laterality Date    BACK SURGERY      PTCA  2018    PTCA/PAMELA RCA (STEMI)     No family history on file.  Social History     Tobacco Use    Smoking status: Former     Current packs/day: 0.00     Types: Cigarettes     Quit date: 1992     Years since quittin.9    Smokeless tobacco: Never   Substance Use Topics    Alcohol use: No       VITAL SIGNS:  BP (!) 160/64 (Site: Left Upper Arm, Position:

## 2024-09-19 RX ORDER — METOPROLOL SUCCINATE 25 MG/1
25 TABLET, EXTENDED RELEASE ORAL DAILY
Qty: 30 TABLET | Refills: 5 | Status: SHIPPED | OUTPATIENT
Start: 2024-09-19

## 2024-11-04 ENCOUNTER — CLINICAL DOCUMENTATION (OUTPATIENT)
Age: 84
End: 2024-11-04

## 2024-11-04 NOTE — PROGRESS NOTES
This nurse completed and faxed Provider portion of [employer] patient Assistance Application for 2025 on 10/29/24. (Scanned)  Received fax statins that Patient is missing information for their portion.   Mailed incomplete forms to the patient.

## 2024-11-11 RX ORDER — ASPIRIN 81 MG/1
81 TABLET ORAL DAILY
Qty: 90 TABLET | Refills: 1 | Status: SHIPPED | OUTPATIENT
Start: 2024-11-11

## 2024-11-11 NOTE — TELEPHONE ENCOUNTER
PCP: Rebekah Puri, APRN - NP    Last appt: 8/1/2024   Future Appointments   Date Time Provider Department Center   2/25/2025  3:20 PM Mark Hooper MD [facility]       Requested Prescriptions     Signed Prescriptions Disp Refills    aspirin (ASPIRIN ADULT LOW STRENGTH) 81 MG EC tablet 90 tablet 1     Sig: Take 1 tablet by mouth daily     Authorizing Provider: MARK HOOPER     Ordering User: STORMY RUDOLPH         Other Comments:  Verbal order per provider.  Order (medication, dose, route, frequency, amount, refills) repeated and verified twice.

## 2024-11-20 ENCOUNTER — TELEPHONE (OUTPATIENT)
Age: 84
End: 2024-11-20

## 2024-11-20 NOTE — TELEPHONE ENCOUNTER
Patient having concerns with obtaining entresto from the [employer] company [phone]    Please assist with obtaining medication      Call [employer] at [phone]. FAX: [phone] or [place] Mail: [address] [place] [address]     Genoveva Jamel returned your call.  # [phone]

## 2024-11-20 NOTE — TELEPHONE ENCOUNTER
This nurse completed and faxed Provider portion of [employer] patient Assistance Application for 2025 on 10/29/24. (Scanned)  Received fax statins that Patient is missing information for their portion.   Mailed incomplete forms to the patient.   Refaxed yesterday after patient dropped off a blank provider form.

## 2024-11-20 NOTE — TELEPHONE ENCOUNTER
Patient having concerns with obtaining entresto from the following company [id number]    Please assist with obtaining medication      Call [employer] at [phone]. FAX: [phone] or [place] Mail: [address] [place] [address]

## 2024-11-22 NOTE — TELEPHONE ENCOUNTER
Spoke with Genoveva.  Verified patient with two patient identifiers.  She will fax over his tax documents including his form for entresto.

## 2025-02-13 NOTE — PROGRESS NOTES
ASSESSMENT and PLAN  1. Coronary artery disease involving native coronary artery of native heart without angina pectoris  Free of angina s/p inferior STEMI with subsequent distal RCA PCI/PAMELA 8/8/2018.  Continue current cardioprotective medications and risk factor modification efforts.    2. Ischemic cardiomyopathy  No clinical evidence of decompensation.  Continue current GDMT except the Entresto was cost prohibitive - replace with losartan 25 mg daily.  Increase losartan dose as BP allows.    3.  Nonrheumatic mitral valve regurgitation  Mild MR on echo 7/16/2024, down from moderate on the echo 1 year prior.  Continue to monitor.    4. PVCs (premature ventricular contractions)  11% PVC burden on monitor 8/2022.  Asymptomatic. Continue metoprolol.    5.  Primary hypertension  Suboptimal control but he is off of the Entresto.  Continue current medications and add losartan 25 mg daily (see above).  Increase losartan dose as BP allows.    6. Hypercholesterolemia  Labs 1/13/2025 demonstrated total cholesterol 116, triglyceride 139, HDL 29, LDL 59.  Continue atorvastatin as prescribed.       Follow-up in 6 months.  The patient has been instructed and agrees to call our office with any issues or other concerns related to their cardiac condition(s) and/or complaint(s).      CHIEF COMPLAINT  Routine follow-up    HPI:    Francisca Norris is a 84 y.o. male here for follow-up of coronary artery disease and ischemic cardiomyopathy.  No cardiac issues that developed since the last visit on 8/1/2024.  He denies chest pain, dyspnea, orthopnea, PND, palpitations or syncope.  Most recent echo 7/16/2024 demonstrated EF 50-55%, basal-mid inferior inferoseptal and inferolateral hypokinesis, mild MR and normal right heart size and function.  He has been off of Entresto since 11/2024 due to cost issues despite completing the application process for financial assistance through the drug .    PERTINENT CARDIAC HISTORY:

## 2025-02-25 ENCOUNTER — OFFICE VISIT (OUTPATIENT)
Age: 85
End: 2025-02-25
Payer: MEDICARE

## 2025-02-25 VITALS
TEMPERATURE: 98.3 F | HEIGHT: 69 IN | DIASTOLIC BLOOD PRESSURE: 76 MMHG | HEART RATE: 50 BPM | WEIGHT: 159 LBS | BODY MASS INDEX: 23.55 KG/M2 | OXYGEN SATURATION: 97 % | SYSTOLIC BLOOD PRESSURE: 144 MMHG

## 2025-02-25 DIAGNOSIS — I10 PRIMARY HYPERTENSION: ICD-10-CM

## 2025-02-25 DIAGNOSIS — I25.5 ISCHEMIC CARDIOMYOPATHY: ICD-10-CM

## 2025-02-25 DIAGNOSIS — I34.0 NONRHEUMATIC MITRAL VALVE REGURGITATION: ICD-10-CM

## 2025-02-25 DIAGNOSIS — E78.00 HYPERCHOLESTEROLEMIA: ICD-10-CM

## 2025-02-25 DIAGNOSIS — I49.3 PVCS (PREMATURE VENTRICULAR CONTRACTIONS): ICD-10-CM

## 2025-02-25 DIAGNOSIS — I25.10 CORONARY ARTERY DISEASE INVOLVING NATIVE CORONARY ARTERY OF NATIVE HEART WITHOUT ANGINA PECTORIS: Primary | ICD-10-CM

## 2025-02-25 PROCEDURE — 3078F DIAST BP <80 MM HG: CPT | Performed by: INTERNAL MEDICINE

## 2025-02-25 PROCEDURE — G8420 CALC BMI NORM PARAMETERS: HCPCS | Performed by: INTERNAL MEDICINE

## 2025-02-25 PROCEDURE — G8428 CUR MEDS NOT DOCUMENT: HCPCS | Performed by: INTERNAL MEDICINE

## 2025-02-25 PROCEDURE — 1036F TOBACCO NON-USER: CPT | Performed by: INTERNAL MEDICINE

## 2025-02-25 PROCEDURE — 1123F ACP DISCUSS/DSCN MKR DOCD: CPT | Performed by: INTERNAL MEDICINE

## 2025-02-25 PROCEDURE — 99214 OFFICE O/P EST MOD 30 MIN: CPT | Performed by: INTERNAL MEDICINE

## 2025-02-25 PROCEDURE — 3077F SYST BP >= 140 MM HG: CPT | Performed by: INTERNAL MEDICINE

## 2025-02-25 PROCEDURE — 1126F AMNT PAIN NOTED NONE PRSNT: CPT | Performed by: INTERNAL MEDICINE

## 2025-02-25 RX ORDER — LOSARTAN POTASSIUM 25 MG/1
25 TABLET ORAL DAILY
Qty: 30 TABLET | Refills: 5 | Status: SHIPPED | OUTPATIENT
Start: 2025-02-25

## 2025-02-25 ASSESSMENT — PATIENT HEALTH QUESTIONNAIRE - PHQ9
2. FEELING DOWN, DEPRESSED OR HOPELESS: SEVERAL DAYS
SUM OF ALL RESPONSES TO PHQ9 QUESTIONS 1 & 2: 2
SUM OF ALL RESPONSES TO PHQ QUESTIONS 1-9: 2
1. LITTLE INTEREST OR PLEASURE IN DOING THINGS: SEVERAL DAYS

## 2025-02-25 NOTE — PROGRESS NOTES
Identified pt with two pt identifiers(name and ). Reviewed record in preparation for visit and have obtained necessary documentation.  Chief Complaint   Patient presents with    Atrial Fibrillation    Cholesterol Problem    Hypertension      BP (!) 144/76 (Site: Left Upper Arm, Position: Sitting, Cuff Size: Medium Adult)   Pulse 50   Temp 98.3 °F (36.8 °C) (Temporal)   Ht 1.753 m (5' 9\")   Wt 72.1 kg (159 lb)   SpO2 97%   BMI 23.48 kg/m²       Medications reviewed/approved by provider.      Health Maintenance Review: Patient reminded of \"due or due soon\" health maintenance. I have asked the patient to contact his/her primary care provider (PCP) for follow-up on his/her health maintenance.    Coordination of Care Questionnaire:  :   1) Have you been to an emergency room, urgent care, or hospitalized since your last visit?  If yes, where when, and reason for visit? no       2. Have seen or consulted any other health care provider since your last visit?   If yes, where when, and reason for visit?  no      Patient is accompanied by daughter I have received verbal consent from Francisca Norris to discuss any/all medical information while they are present in the room.

## 2025-02-26 RX ORDER — NITROGLYCERIN 0.4 MG/1
0.4 TABLET SUBLINGUAL EVERY 5 MIN PRN
Qty: 25 TABLET | Refills: 1 | Status: SHIPPED | OUTPATIENT
Start: 2025-02-26

## 2025-02-26 NOTE — TELEPHONE ENCOUNTER
PCP: Rebekah Puri, APRN - NP    Last appt: 2/25/2025   Future Appointments   Date Time Provider Department Center   10/27/2025  3:20 PM Mark Hooper MD [facility] AM[facility]       Requested Prescriptions     Signed Prescriptions Disp Refills    nitroGLYCERIN (NITROSTAT) 0.4 MG SL tablet 25 tablet 1     Sig: Place 1 tablet under the tongue every 5 minutes as needed for Chest pain If pain has not subsided after 15 minutes CALL 911     Authorizing Provider: MARK HOOPER     Ordering User: STORMY RUDOLPH         Other Comments:  Verbal order per provider.  Order (medication, dose, route, frequency, amount, refills) repeated and verified twice.

## 2025-03-19 RX ORDER — METOPROLOL SUCCINATE 25 MG/1
25 TABLET, EXTENDED RELEASE ORAL DAILY
Qty: 180 TABLET | Refills: 2 | Status: SHIPPED | OUTPATIENT
Start: 2025-03-19

## 2025-03-19 NOTE — TELEPHONE ENCOUNTER
PCP: Rebekah Puri, APRN - NP    Last appt: 2/25/2025   Future Appointments   Date Time Provider Department Center   10/27/2025  3:20 PM Mark Hooper MD [facility]       Requested Prescriptions     Signed Prescriptions Disp Refills    metoprolol succinate (TOPROL XL) 25 MG extended release tablet 180 tablet 2     Sig: Take 1 tablet by mouth daily     Authorizing Provider: MARK HOOPER     Ordering User: STORMY RUDOLPH         Other Comments:  Verbal order per provider.  Order (medication, dose, route, frequency, amount, refills) repeated and verified twice.

## 2025-05-15 RX ORDER — ASPIRIN 81 MG/1
81 TABLET ORAL DAILY
Qty: 90 TABLET | Refills: 1 | Status: SHIPPED | OUTPATIENT
Start: 2025-05-15

## 2025-05-15 NOTE — TELEPHONE ENCOUNTER
PCP: Rebekah Puri, APRN - NP    Last appt: 2/25/2025   Future Appointments   Date Time Provider Department Center   10/27/2025  3:20 PM Mark Hooper MD [facility]       Requested Prescriptions     Signed Prescriptions Disp Refills    aspirin (ASPIRIN ADULT LOW STRENGTH) 81 MG EC tablet 90 tablet 1     Sig: Take 1 tablet by mouth daily     Authorizing Provider: MARK HOOPER     Ordering User: STORMY RUDOLPH         Other Comments:  Verbal order per provider.  Order (medication, dose, route, frequency, amount, refills) repeated and verified twice.

## 2025-06-02 ENCOUNTER — APPOINTMENT (OUTPATIENT)
Facility: HOSPITAL | Age: 85
End: 2025-06-02
Payer: MEDICARE

## 2025-06-02 ENCOUNTER — HOSPITAL ENCOUNTER (INPATIENT)
Facility: HOSPITAL | Age: 85
LOS: 13 days | Discharge: HOSPICE | DRG: 180 | End: 2025-06-15
Attending: STUDENT IN AN ORGANIZED HEALTH CARE EDUCATION/TRAINING PROGRAM | Admitting: STUDENT IN AN ORGANIZED HEALTH CARE EDUCATION/TRAINING PROGRAM
Payer: MEDICARE

## 2025-06-02 ENCOUNTER — HOSPITAL ENCOUNTER (EMERGENCY)
Facility: HOSPITAL | Age: 85
Discharge: ANOTHER HOSPITAL | End: 2025-06-02
Attending: EMERGENCY MEDICINE
Payer: MEDICARE

## 2025-06-02 VITALS
HEIGHT: 69 IN | SYSTOLIC BLOOD PRESSURE: 145 MMHG | TEMPERATURE: 98.3 F | HEART RATE: 107 BPM | RESPIRATION RATE: 19 BRPM | OXYGEN SATURATION: 95 % | DIASTOLIC BLOOD PRESSURE: 78 MMHG | BODY MASS INDEX: 24.5 KG/M2 | WEIGHT: 165.4 LBS

## 2025-06-02 DIAGNOSIS — J96.01 ACUTE HYPOXIC RESPIRATORY FAILURE (HCC): ICD-10-CM

## 2025-06-02 DIAGNOSIS — C79.9 METASTATIC MALIGNANT NEOPLASM, UNSPECIFIED SITE (HCC): Primary | ICD-10-CM

## 2025-06-02 DIAGNOSIS — C79.9 MULTIPLE LESIONS OF METASTATIC MALIGNANCY (HCC): Primary | ICD-10-CM

## 2025-06-02 LAB
ALBUMIN SERPL-MCNC: 3.2 G/DL (ref 3.5–5)
ALBUMIN/GLOB SERPL: 0.8 (ref 1.1–2.2)
ALP SERPL-CCNC: 278 U/L (ref 45–117)
ALT SERPL-CCNC: 175 U/L (ref 12–78)
ANION GAP SERPL CALC-SCNC: 14 MMOL/L (ref 2–12)
APPEARANCE UR: CLEAR
AST SERPL-CCNC: 206 U/L (ref 15–37)
BACTERIA URNS QL MICRO: NEGATIVE /HPF
BASOPHILS # BLD: 0.07 K/UL (ref 0–0.1)
BASOPHILS NFR BLD: 0.8 % (ref 0–1)
BILIRUB SERPL-MCNC: 0.9 MG/DL (ref 0.2–1)
BILIRUB UR QL: NEGATIVE
BUN SERPL-MCNC: 36 MG/DL (ref 6–20)
BUN/CREAT SERPL: 19 (ref 12–20)
CALCIUM SERPL-MCNC: 13 MG/DL (ref 8.5–10.1)
CHLORIDE SERPL-SCNC: 103 MMOL/L (ref 97–108)
CO2 SERPL-SCNC: 25 MMOL/L (ref 21–32)
COLOR UR: ABNORMAL
CREAT SERPL-MCNC: 1.87 MG/DL (ref 0.7–1.3)
DIFFERENTIAL METHOD BLD: ABNORMAL
EOSINOPHIL # BLD: 0.11 K/UL (ref 0–0.4)
EOSINOPHIL NFR BLD: 1.3 % (ref 0–7)
EPITH CASTS URNS QL MICRO: ABNORMAL /LPF
ERYTHROCYTE [DISTWIDTH] IN BLOOD BY AUTOMATED COUNT: 14 % (ref 11.5–14.5)
GLOBULIN SER CALC-MCNC: 3.9 G/DL (ref 2–4)
GLUCOSE SERPL-MCNC: 122 MG/DL (ref 65–100)
GLUCOSE UR STRIP.AUTO-MCNC: NEGATIVE MG/DL
HCT VFR BLD AUTO: 37.3 % (ref 36.6–50.3)
HGB BLD-MCNC: 12.3 G/DL (ref 12.1–17)
HGB UR QL STRIP: ABNORMAL
IMM GRANULOCYTES # BLD AUTO: 0.48 K/UL (ref 0–0.04)
IMM GRANULOCYTES NFR BLD AUTO: 5.4 % (ref 0–0.5)
KETONES UR QL STRIP.AUTO: ABNORMAL MG/DL
LEUKOCYTE ESTERASE UR QL STRIP.AUTO: NEGATIVE
LYMPHOCYTES # BLD: 1.43 K/UL (ref 0.8–3.5)
LYMPHOCYTES NFR BLD: 16.3 % (ref 12–49)
MCH RBC QN AUTO: 29.8 PG (ref 26–34)
MCHC RBC AUTO-ENTMCNC: 33 G/DL (ref 30–36.5)
MCV RBC AUTO: 90.3 FL (ref 80–99)
MONOCYTES # BLD: 0.72 K/UL (ref 0–1)
MONOCYTES NFR BLD: 8.2 % (ref 5–13)
NEUTS SEG # BLD: 5.98 K/UL (ref 1.8–8)
NEUTS SEG NFR BLD: 68 % (ref 32–75)
NITRITE UR QL STRIP.AUTO: NEGATIVE
NRBC # BLD: 0.05 K/UL (ref 0–0.01)
NRBC BLD-RTO: 0.6 PER 100 WBC
NT PRO BNP: 797 PG/ML (ref 0–450)
PH UR STRIP: 5.5 (ref 5–8)
PLATELET # BLD AUTO: 192 K/UL (ref 150–400)
PLATELET COMMENT: ABNORMAL
PMV BLD AUTO: 11.3 FL (ref 8.9–12.9)
POTASSIUM SERPL-SCNC: 2.8 MMOL/L (ref 3.5–5.1)
PROT SERPL-MCNC: 7.1 G/DL (ref 6.4–8.2)
PROT UR STRIP-MCNC: NEGATIVE MG/DL
RBC # BLD AUTO: 4.13 M/UL (ref 4.1–5.7)
RBC #/AREA URNS HPF: ABNORMAL /HPF (ref 0–5)
RBC MORPH BLD: ABNORMAL
SODIUM SERPL-SCNC: 142 MMOL/L (ref 136–145)
SP GR UR REFRACTOMETRY: 1.01 (ref 1–1.03)
TROPONIN I SERPL HS-MCNC: 39 NG/L (ref 0–76)
URINE CULTURE IF INDICATED: ABNORMAL
UROBILINOGEN UR QL STRIP.AUTO: 0.2 EU/DL (ref 0.2–1)
WBC # BLD AUTO: 8.8 K/UL (ref 4.1–11.1)
WBC URNS QL MICRO: ABNORMAL /HPF (ref 0–4)

## 2025-06-02 PROCEDURE — 6360000004 HC RX CONTRAST MEDICATION: Performed by: EMERGENCY MEDICINE

## 2025-06-02 PROCEDURE — 85025 COMPLETE CBC W/AUTO DIFF WBC: CPT

## 2025-06-02 PROCEDURE — 76705 ECHO EXAM OF ABDOMEN: CPT

## 2025-06-02 PROCEDURE — 96375 TX/PRO/DX INJ NEW DRUG ADDON: CPT

## 2025-06-02 PROCEDURE — 93005 ELECTROCARDIOGRAM TRACING: CPT | Performed by: EMERGENCY MEDICINE

## 2025-06-02 PROCEDURE — 81001 URINALYSIS AUTO W/SCOPE: CPT

## 2025-06-02 PROCEDURE — 51798 US URINE CAPACITY MEASURE: CPT

## 2025-06-02 PROCEDURE — 6360000002 HC RX W HCPCS: Performed by: EMERGENCY MEDICINE

## 2025-06-02 PROCEDURE — 83880 ASSAY OF NATRIURETIC PEPTIDE: CPT

## 2025-06-02 PROCEDURE — 84484 ASSAY OF TROPONIN QUANT: CPT

## 2025-06-02 PROCEDURE — 71045 X-RAY EXAM CHEST 1 VIEW: CPT

## 2025-06-02 PROCEDURE — 6370000000 HC RX 637 (ALT 250 FOR IP): Performed by: EMERGENCY MEDICINE

## 2025-06-02 PROCEDURE — 74177 CT ABD & PELVIS W/CONTRAST: CPT

## 2025-06-02 PROCEDURE — 96374 THER/PROPH/DIAG INJ IV PUSH: CPT

## 2025-06-02 PROCEDURE — 2580000003 HC RX 258: Performed by: EMERGENCY MEDICINE

## 2025-06-02 PROCEDURE — 71275 CT ANGIOGRAPHY CHEST: CPT

## 2025-06-02 PROCEDURE — 80053 COMPREHEN METABOLIC PANEL: CPT

## 2025-06-02 PROCEDURE — 99285 EMERGENCY DEPT VISIT HI MDM: CPT

## 2025-06-02 PROCEDURE — 36415 COLL VENOUS BLD VENIPUNCTURE: CPT

## 2025-06-02 PROCEDURE — 1100000000 HC RM PRIVATE

## 2025-06-02 RX ORDER — 0.9 % SODIUM CHLORIDE 0.9 %
1000 INTRAVENOUS SOLUTION INTRAVENOUS ONCE
Status: COMPLETED | OUTPATIENT
Start: 2025-06-02 | End: 2025-06-02

## 2025-06-02 RX ORDER — LIDOCAINE HYDROCHLORIDE AND EPINEPHRINE 10; 10 MG/ML; UG/ML
20 INJECTION, SOLUTION INFILTRATION; PERINEURAL ONCE
Status: DISCONTINUED | OUTPATIENT
Start: 2025-06-02 | End: 2025-06-02

## 2025-06-02 RX ORDER — IOPAMIDOL 755 MG/ML
100 INJECTION, SOLUTION INTRAVASCULAR
Status: COMPLETED | OUTPATIENT
Start: 2025-06-02 | End: 2025-06-02

## 2025-06-02 RX ORDER — ONDANSETRON 2 MG/ML
4 INJECTION INTRAMUSCULAR; INTRAVENOUS ONCE
Status: COMPLETED | OUTPATIENT
Start: 2025-06-02 | End: 2025-06-02

## 2025-06-02 RX ORDER — MORPHINE SULFATE 4 MG/ML
4 INJECTION, SOLUTION INTRAMUSCULAR; INTRAVENOUS
Status: COMPLETED | OUTPATIENT
Start: 2025-06-02 | End: 2025-06-02

## 2025-06-02 RX ADMIN — SODIUM CHLORIDE 1000 ML: 0.9 INJECTION, SOLUTION INTRAVENOUS at 13:09

## 2025-06-02 RX ADMIN — POTASSIUM BICARBONATE 40 MEQ: 782 TABLET, EFFERVESCENT ORAL at 18:09

## 2025-06-02 RX ADMIN — IOPAMIDOL 71 ML: 755 INJECTION, SOLUTION INTRAVENOUS at 15:14

## 2025-06-02 RX ADMIN — MORPHINE SULFATE 4 MG: 4 INJECTION INTRAVENOUS at 13:14

## 2025-06-02 RX ADMIN — POTASSIUM BICARBONATE 40 MEQ: 782 TABLET, EFFERVESCENT ORAL at 13:19

## 2025-06-02 RX ADMIN — IOPAMIDOL 100 ML: 755 INJECTION, SOLUTION INTRAVENOUS at 12:56

## 2025-06-02 RX ADMIN — ONDANSETRON 4 MG: 2 INJECTION, SOLUTION INTRAMUSCULAR; INTRAVENOUS at 13:12

## 2025-06-02 ASSESSMENT — PAIN - FUNCTIONAL ASSESSMENT: PAIN_FUNCTIONAL_ASSESSMENT: 0-10

## 2025-06-02 ASSESSMENT — LIFESTYLE VARIABLES
HOW MANY STANDARD DRINKS CONTAINING ALCOHOL DO YOU HAVE ON A TYPICAL DAY: PATIENT DOES NOT DRINK
HOW OFTEN DO YOU HAVE A DRINK CONTAINING ALCOHOL: NEVER

## 2025-06-02 ASSESSMENT — PAIN SCALES - GENERAL: PAINLEVEL_OUTOF10: 8

## 2025-06-02 NOTE — ED PROVIDER NOTES
[facility] EMERGENCY DEPARTMENT  EMERGENCY DEPARTMENT ENCOUNTER       Pt Name: Francisca Norris  MRN: [id number]  Birthdate 1940  Date of evaluation: 2025  Provider: Catina Trevino MD   PCP: Rebekah Puri APRN - NP  Note Started: 11:51 AM EDT 25     CHIEF COMPLAINT       Chief Complaint   Patient presents with    Back Pain        HISTORY OF PRESENT ILLNESS: 1 or more elements      History From: patient, History limited by: none     Francisca Norris is a 84 y.o. male presents to ED complaining of generalized weakness and right back and leg pain.  Patient reports right leg pain worsening over the last 2 to 3 weeks.  Now is worried that he is going to fall secondary to the pain.       Please See MDM for Additional Details of the HPI/PMH  Nursing Notes were all reviewed and agreed with or any disagreements were addressed in the HPI.     REVIEW OF SYSTEMS        Positives and Pertinent negatives as per HPI.    PAST HISTORY     Past Medical History:  Past Medical History:   Diagnosis Date    CAD (coronary artery disease)     Hypercholesterolemia     Hypertension     Sciatica     STEMI (ST elevation myocardial infarction) (HCC) 2018    RCA       Past Surgical History:  Past Surgical History:   Procedure Laterality Date    BACK SURGERY      PTCA  2018    PTCA/PAMELA RCA (STEMI)       Family History:  History reviewed. No pertinent family history.    Social History:  Social History     Tobacco Use    Smoking status: Former     Current packs/day: 0.00     Types: Cigarettes     Quit date: 1992     Years since quittin.8    Smokeless tobacco: Never   Substance Use Topics    Alcohol use: No    Drug use: Never       CURRENT MEDICATIONS      Previous Medications    ASPIRIN (ASPIRIN ADULT LOW STRENGTH) 81 MG EC TABLET    Take 1 tablet by mouth daily    ATORVASTATIN (LIPITOR) 40 MG TABLET    Take 1 tablet by mouth nightly    HYDROCHLOROTHIAZIDE (HYDRODIURIL) 25 MG TABLET    Take 1

## 2025-06-02 NOTE — ED TRIAGE NOTES
Pt presents to the ED with c/o right-sided back pain that radiates down right leg. He states that this started more than a week ago and he also feels weak. Pt was able to get up from wheelchair and into bed without assistance.

## 2025-06-03 ENCOUNTER — APPOINTMENT (OUTPATIENT)
Facility: HOSPITAL | Age: 85
DRG: 180 | End: 2025-06-03
Attending: STUDENT IN AN ORGANIZED HEALTH CARE EDUCATION/TRAINING PROGRAM
Payer: MEDICARE

## 2025-06-03 PROBLEM — C79.9 MULTIPLE LESIONS OF METASTATIC MALIGNANCY (HCC): Status: ACTIVE | Noted: 2025-06-03

## 2025-06-03 PROBLEM — J96.01 ACUTE HYPOXIC RESPIRATORY FAILURE (HCC): Status: ACTIVE | Noted: 2025-06-03

## 2025-06-03 LAB
ALBUMIN SERPL-MCNC: 2.9 G/DL (ref 3.5–5)
ALBUMIN/GLOB SERPL: 0.9 (ref 1.1–2.2)
ALP SERPL-CCNC: 237 U/L (ref 45–117)
ALT SERPL-CCNC: 145 U/L (ref 12–78)
ANION GAP SERPL CALC-SCNC: 4 MMOL/L (ref 2–12)
AST SERPL-CCNC: 187 U/L (ref 15–37)
BASOPHILS # BLD: 0 K/UL (ref 0–0.1)
BASOPHILS NFR BLD: 0 % (ref 0–1)
BILIRUB SERPL-MCNC: 0.5 MG/DL (ref 0.2–1)
BUN SERPL-MCNC: 32 MG/DL (ref 6–20)
BUN/CREAT SERPL: 18 (ref 12–20)
CALCIUM SERPL-MCNC: 12.1 MG/DL (ref 8.5–10.1)
CHLORIDE SERPL-SCNC: 113 MMOL/L (ref 97–108)
CO2 SERPL-SCNC: 26 MMOL/L (ref 21–32)
CREAT SERPL-MCNC: 1.76 MG/DL (ref 0.7–1.3)
DIFFERENTIAL METHOD BLD: ABNORMAL
EKG ATRIAL RATE: 111 BPM
EKG DIAGNOSIS: NORMAL
EKG P-R INTERVAL: 200 MS
EKG Q-T INTERVAL: 336 MS
EKG QRS DURATION: 112 MS
EKG QTC CALCULATION (BAZETT): 456 MS
EKG R AXIS: 21 DEGREES
EKG T AXIS: -24 DEGREES
EKG VENTRICULAR RATE: 111 BPM
EOSINOPHIL # BLD: 0.07 K/UL (ref 0–0.4)
EOSINOPHIL NFR BLD: 1 % (ref 0–7)
ERYTHROCYTE [DISTWIDTH] IN BLOOD BY AUTOMATED COUNT: 14.3 % (ref 11.5–14.5)
GLOBULIN SER CALC-MCNC: 3.2 G/DL (ref 2–4)
GLUCOSE BLD STRIP.AUTO-MCNC: 149 MG/DL (ref 65–117)
GLUCOSE SERPL-MCNC: 98 MG/DL (ref 65–100)
HCT VFR BLD AUTO: 34 % (ref 36.6–50.3)
HGB BLD-MCNC: 10.9 G/DL (ref 12.1–17)
IMM GRANULOCYTES # BLD AUTO: 0 K/UL (ref 0–0.04)
IMM GRANULOCYTES NFR BLD AUTO: 0 % (ref 0–0.5)
INR PPP: 1.1 (ref 0.9–1.1)
LYMPHOCYTES # BLD: 0.62 K/UL (ref 0.8–3.5)
LYMPHOCYTES NFR BLD: 9 % (ref 12–49)
MCH RBC QN AUTO: 29.9 PG (ref 26–34)
MCHC RBC AUTO-ENTMCNC: 32.1 G/DL (ref 30–36.5)
MCV RBC AUTO: 93.2 FL (ref 80–99)
MONOCYTES # BLD: 0.28 K/UL (ref 0–1)
MONOCYTES NFR BLD: 4 % (ref 5–13)
NEUTS BAND NFR BLD MANUAL: 2 %
NEUTS SEG # BLD: 5.93 K/UL (ref 1.8–8)
NEUTS SEG NFR BLD: 84 % (ref 32–75)
NRBC # BLD: 0.03 K/UL (ref 0–0.01)
NRBC BLD-RTO: 0.4 PER 100 WBC
PLATELET # BLD AUTO: 154 K/UL (ref 150–400)
PMV BLD AUTO: 12.3 FL (ref 8.9–12.9)
POTASSIUM SERPL-SCNC: 3.5 MMOL/L (ref 3.5–5.1)
PROT SERPL-MCNC: 6.1 G/DL (ref 6.4–8.2)
PROTHROMBIN TIME: 11.5 SEC (ref 9.2–11.2)
RBC # BLD AUTO: 3.65 M/UL (ref 4.1–5.7)
RBC MORPH BLD: ABNORMAL
SERVICE CMNT-IMP: ABNORMAL
SODIUM SERPL-SCNC: 143 MMOL/L (ref 136–145)
WBC # BLD AUTO: 6.9 K/UL (ref 4.1–11.1)

## 2025-06-03 PROCEDURE — 2709999900 US BIOPSY LIVER PERCUTANEOUS

## 2025-06-03 PROCEDURE — 6370000000 HC RX 637 (ALT 250 FOR IP): Performed by: NURSE PRACTITIONER

## 2025-06-03 PROCEDURE — 1100000000 HC RM PRIVATE

## 2025-06-03 PROCEDURE — 88333 PATH CONSLTJ SURG CYTO XM 1: CPT

## 2025-06-03 PROCEDURE — 82962 GLUCOSE BLOOD TEST: CPT

## 2025-06-03 PROCEDURE — 6360000004 HC RX CONTRAST MEDICATION: Performed by: INTERNAL MEDICINE

## 2025-06-03 PROCEDURE — 51798 US URINE CAPACITY MEASURE: CPT

## 2025-06-03 PROCEDURE — 97161 PT EVAL LOW COMPLEX 20 MIN: CPT

## 2025-06-03 PROCEDURE — 70553 MRI BRAIN STEM W/O & W/DYE: CPT

## 2025-06-03 PROCEDURE — 88360 TUMOR IMMUNOHISTOCHEM/MANUAL: CPT

## 2025-06-03 PROCEDURE — 88342 IMHCHEM/IMCYTCHM 1ST ANTB: CPT

## 2025-06-03 PROCEDURE — 81479 UNLISTED MOLECULAR PATHOLOGY: CPT

## 2025-06-03 PROCEDURE — 99222 1ST HOSP IP/OBS MODERATE 55: CPT | Performed by: STUDENT IN AN ORGANIZED HEALTH CARE EDUCATION/TRAINING PROGRAM

## 2025-06-03 PROCEDURE — 94760 N-INVAS EAR/PLS OXIMETRY 1: CPT

## 2025-06-03 PROCEDURE — 88307 TISSUE EXAM BY PATHOLOGIST: CPT

## 2025-06-03 PROCEDURE — 99222 1ST HOSP IP/OBS MODERATE 55: CPT | Performed by: INTERNAL MEDICINE

## 2025-06-03 PROCEDURE — 6370000000 HC RX 637 (ALT 250 FOR IP)

## 2025-06-03 PROCEDURE — 6360000002 HC RX W HCPCS: Performed by: NURSE PRACTITIONER

## 2025-06-03 PROCEDURE — 36415 COLL VENOUS BLD VENIPUNCTURE: CPT

## 2025-06-03 PROCEDURE — 2580000003 HC RX 258

## 2025-06-03 PROCEDURE — 88341 IMHCHEM/IMCYTCHM EA ADD ANTB: CPT

## 2025-06-03 PROCEDURE — 2700000000 HC OXYGEN THERAPY PER DAY

## 2025-06-03 PROCEDURE — A9579 GAD-BASE MR CONTRAST NOS,1ML: HCPCS | Performed by: INTERNAL MEDICINE

## 2025-06-03 PROCEDURE — 97165 OT EVAL LOW COMPLEX 30 MIN: CPT

## 2025-06-03 PROCEDURE — 80053 COMPREHEN METABOLIC PANEL: CPT

## 2025-06-03 PROCEDURE — 97530 THERAPEUTIC ACTIVITIES: CPT

## 2025-06-03 PROCEDURE — 6360000002 HC RX W HCPCS: Performed by: STUDENT IN AN ORGANIZED HEALTH CARE EDUCATION/TRAINING PROGRAM

## 2025-06-03 PROCEDURE — 85610 PROTHROMBIN TIME: CPT

## 2025-06-03 PROCEDURE — 2500000003 HC RX 250 WO HCPCS

## 2025-06-03 PROCEDURE — 85025 COMPLETE CBC W/AUTO DIFF WBC: CPT

## 2025-06-03 RX ORDER — LIDOCAINE HYDROCHLORIDE 20 MG/ML
JELLY TOPICAL
Status: COMPLETED | OUTPATIENT
Start: 2025-06-03 | End: 2025-06-03

## 2025-06-03 RX ORDER — POLYETHYLENE GLYCOL 3350 17 G/17G
17 POWDER, FOR SOLUTION ORAL DAILY PRN
Status: DISCONTINUED | OUTPATIENT
Start: 2025-06-03 | End: 2025-06-13

## 2025-06-03 RX ORDER — HYDROCHLOROTHIAZIDE 25 MG/1
25 TABLET ORAL DAILY
Status: DISCONTINUED | OUTPATIENT
Start: 2025-06-03 | End: 2025-06-13

## 2025-06-03 RX ORDER — TAMSULOSIN HYDROCHLORIDE 0.4 MG/1
0.4 CAPSULE ORAL DAILY
Status: DISCONTINUED | OUTPATIENT
Start: 2025-06-03 | End: 2025-06-13

## 2025-06-03 RX ORDER — ATORVASTATIN CALCIUM 40 MG/1
40 TABLET, FILM COATED ORAL NIGHTLY
Status: DISCONTINUED | OUTPATIENT
Start: 2025-06-03 | End: 2025-06-13

## 2025-06-03 RX ORDER — SODIUM CHLORIDE 0.9 % (FLUSH) 0.9 %
5-40 SYRINGE (ML) INJECTION EVERY 12 HOURS SCHEDULED
Status: DISCONTINUED | OUTPATIENT
Start: 2025-06-03 | End: 2025-06-13

## 2025-06-03 RX ORDER — FENTANYL CITRATE 50 UG/ML
INJECTION, SOLUTION INTRAMUSCULAR; INTRAVENOUS PRN
Status: COMPLETED | OUTPATIENT
Start: 2025-06-03 | End: 2025-06-03

## 2025-06-03 RX ORDER — MIDAZOLAM HYDROCHLORIDE 2 MG/2ML
INJECTION, SOLUTION INTRAMUSCULAR; INTRAVENOUS PRN
Status: COMPLETED | OUTPATIENT
Start: 2025-06-03 | End: 2025-06-03

## 2025-06-03 RX ORDER — SODIUM CHLORIDE, SODIUM LACTATE, POTASSIUM CHLORIDE, AND CALCIUM CHLORIDE .6; .31; .03; .02 G/100ML; G/100ML; G/100ML; G/100ML
500 INJECTION, SOLUTION INTRAVENOUS ONCE
Status: DISCONTINUED | OUTPATIENT
Start: 2025-06-03 | End: 2025-06-03

## 2025-06-03 RX ORDER — 0.9 % SODIUM CHLORIDE 0.9 %
500 INTRAVENOUS SOLUTION INTRAVENOUS ONCE
Status: COMPLETED | OUTPATIENT
Start: 2025-06-03 | End: 2025-06-04

## 2025-06-03 RX ORDER — SODIUM CHLORIDE 9 MG/ML
INJECTION, SOLUTION INTRAVENOUS PRN
Status: DISCONTINUED | OUTPATIENT
Start: 2025-06-03 | End: 2025-06-13

## 2025-06-03 RX ORDER — LOSARTAN POTASSIUM 25 MG/1
25 TABLET ORAL DAILY
Status: DISCONTINUED | OUTPATIENT
Start: 2025-06-03 | End: 2025-06-13

## 2025-06-03 RX ORDER — SODIUM CHLORIDE 0.9 % (FLUSH) 0.9 %
5-40 SYRINGE (ML) INJECTION PRN
Status: DISCONTINUED | OUTPATIENT
Start: 2025-06-03 | End: 2025-06-07 | Stop reason: SDUPTHER

## 2025-06-03 RX ORDER — HYDROMORPHONE HYDROCHLORIDE 1 MG/ML
0.5 INJECTION, SOLUTION INTRAMUSCULAR; INTRAVENOUS; SUBCUTANEOUS ONCE
Status: DISCONTINUED | OUTPATIENT
Start: 2025-06-03 | End: 2025-06-11

## 2025-06-03 RX ORDER — ACETAMINOPHEN 650 MG/1
650 SUPPOSITORY RECTAL EVERY 6 HOURS PRN
Status: DISCONTINUED | OUTPATIENT
Start: 2025-06-03 | End: 2025-06-15 | Stop reason: HOSPADM

## 2025-06-03 RX ORDER — LORAZEPAM 2 MG/ML
1 CONCENTRATE ORAL
Status: COMPLETED | OUTPATIENT
Start: 2025-06-03 | End: 2025-06-03

## 2025-06-03 RX ORDER — LIDOCAINE HYDROCHLORIDE 20 MG/ML
JELLY TOPICAL ONCE
Status: COMPLETED | OUTPATIENT
Start: 2025-06-03 | End: 2025-06-03

## 2025-06-03 RX ORDER — ACETAMINOPHEN 325 MG/1
650 TABLET ORAL EVERY 6 HOURS PRN
Status: DISCONTINUED | OUTPATIENT
Start: 2025-06-03 | End: 2025-06-15 | Stop reason: HOSPADM

## 2025-06-03 RX ORDER — ONDANSETRON 2 MG/ML
4 INJECTION INTRAMUSCULAR; INTRAVENOUS EVERY 6 HOURS PRN
Status: DISCONTINUED | OUTPATIENT
Start: 2025-06-03 | End: 2025-06-13

## 2025-06-03 RX ORDER — GADOTERIDOL 279.3 MG/ML
15 INJECTION INTRAVENOUS
Status: COMPLETED | OUTPATIENT
Start: 2025-06-03 | End: 2025-06-03

## 2025-06-03 RX ORDER — ONDANSETRON 4 MG/1
4 TABLET, ORALLY DISINTEGRATING ORAL EVERY 8 HOURS PRN
Status: DISCONTINUED | OUTPATIENT
Start: 2025-06-03 | End: 2025-06-13

## 2025-06-03 RX ORDER — LIDOCAINE HYDROCHLORIDE 10 MG/ML
10 INJECTION, SOLUTION EPIDURAL; INFILTRATION; INTRACAUDAL; PERINEURAL ONCE
Status: COMPLETED | OUTPATIENT
Start: 2025-06-03 | End: 2025-06-03

## 2025-06-03 RX ORDER — SODIUM CHLORIDE 9 MG/ML
INJECTION, SOLUTION INTRAVENOUS CONTINUOUS
Status: ACTIVE | OUTPATIENT
Start: 2025-06-03 | End: 2025-06-03

## 2025-06-03 RX ORDER — METOPROLOL SUCCINATE 25 MG/1
25 TABLET, EXTENDED RELEASE ORAL DAILY
Status: DISCONTINUED | OUTPATIENT
Start: 2025-06-03 | End: 2025-06-13

## 2025-06-03 RX ADMIN — SODIUM CHLORIDE: 0.9 INJECTION, SOLUTION INTRAVENOUS at 01:32

## 2025-06-03 RX ADMIN — LORAZEPAM 1 MG: 2 SOLUTION, CONCENTRATE ORAL at 18:57

## 2025-06-03 RX ADMIN — SODIUM CHLORIDE, PRESERVATIVE FREE 10 ML: 5 INJECTION INTRAVENOUS at 08:58

## 2025-06-03 RX ADMIN — LIDOCAINE HYDROCHLORIDE: 20 JELLY TOPICAL at 09:38

## 2025-06-03 RX ADMIN — LIDOCAINE HYDROCHLORIDE: 20 JELLY TOPICAL at 06:03

## 2025-06-03 RX ADMIN — METOPROLOL SUCCINATE 25 MG: 25 TABLET, EXTENDED RELEASE ORAL at 08:53

## 2025-06-03 RX ADMIN — TAMSULOSIN HYDROCHLORIDE 0.4 MG: 0.4 CAPSULE ORAL at 10:51

## 2025-06-03 RX ADMIN — LIDOCAINE HYDROCHLORIDE 10 ML: 10 INJECTION, SOLUTION EPIDURAL; INFILTRATION; INTRACAUDAL; PERINEURAL at 13:34

## 2025-06-03 RX ADMIN — GADOTERIDOL 15 ML: 279.3 INJECTION, SOLUTION INTRAVENOUS at 20:12

## 2025-06-03 RX ADMIN — SODIUM CHLORIDE 500 ML: 0.9 INJECTION, SOLUTION INTRAVENOUS at 22:26

## 2025-06-03 RX ADMIN — SODIUM CHLORIDE, PRESERVATIVE FREE 10 ML: 5 INJECTION INTRAVENOUS at 21:07

## 2025-06-03 RX ADMIN — ACETAMINOPHEN 650 MG: 325 TABLET ORAL at 17:03

## 2025-06-03 RX ADMIN — FENTANYL CITRATE 50 MCG: 50 INJECTION, SOLUTION INTRAMUSCULAR; INTRAVENOUS at 13:00

## 2025-06-03 ASSESSMENT — PAIN SCALES - GENERAL
PAINLEVEL_OUTOF10: 6
PAINLEVEL_OUTOF10: 6
PAINLEVEL_OUTOF10: 8

## 2025-06-03 ASSESSMENT — PAIN - FUNCTIONAL ASSESSMENT
PAIN_FUNCTIONAL_ASSESSMENT: ACTIVITIES ARE NOT PREVENTED
PAIN_FUNCTIONAL_ASSESSMENT: NONE - DENIES PAIN

## 2025-06-03 ASSESSMENT — PAIN DESCRIPTION - PAIN TYPE
TYPE: ACUTE PAIN

## 2025-06-03 ASSESSMENT — PAIN DESCRIPTION - ONSET
ONSET: ON-GOING

## 2025-06-03 ASSESSMENT — PAIN DESCRIPTION - LOCATION
LOCATION: FLANK

## 2025-06-03 ASSESSMENT — PAIN DESCRIPTION - ORIENTATION
ORIENTATION: RIGHT

## 2025-06-03 ASSESSMENT — PAIN DESCRIPTION - FREQUENCY
FREQUENCY: INTERMITTENT

## 2025-06-03 ASSESSMENT — PAIN DESCRIPTION - DESCRIPTORS
DESCRIPTORS: ACHING

## 2025-06-03 NOTE — PLAN OF CARE
Problem: Occupational Therapy - Adult  Goal: By Discharge: Performs self-care activities at highest level of function for planned discharge setting.  See evaluation for individualized goals.  Description: FUNCTIONAL STATUS PRIOR TO ADMISSION:  pt stated independence w/ ADLs and mod I for mobility w/ RW   , Prior Level of Assist for ADLs: Independent,  ,  ,  ,  ,  , Prior Level of Assist for Homemaking: Independent,  , Prior Level of Assist for Transfers: Independent, Active : Yes     HOME SUPPORT: pt lived alone     Occupational Therapy Goals:  Initiated 6/3/2025  1.  Patient will perform upper body dressing with [place] within 7 day(s).  2.  Patient will perform grooming with Modified [place] within 7 day(s).  3.  Patient will perform lower body dressing with Modified [place] within 7 day(s).  4.  Patient will perform toilet transfers with Modified [place]  within 7 day(s).  5.  Patient will perform all aspects of toileting with [place] within 7 day(s).  6.  Patient will participate in upper extremity therapeutic exercise/activities with Supervision for 7 minutes within 7 day(s).    7.  Patient will utilize energy conservation techniques during functional activities with verbal cues within 7 day(s).   Outcome: Progressing    OCCUPATIONAL THERAPY EVALUATION    Patient: Francisca Norris (84 y.o. male)  Date: 6/3/2025  Primary Diagnosis: Metastatic cancer (HCC) [C79.9]  Acute hypoxic respiratory failure (HCC) [J96.01]         Precautions:                    ASSESSMENT :  The patient is limited by decreased functional mobility, independence in ADLs, high-level IADLs, activity tolerance, endurance, safety awareness, command following, balance, general weakness . Pt seen supine in bed, agreeable to therapy services on 2 L NC at start of session, pt weaned to RA and maintained above 90% during session (however, placed on 1-2L NC at end of session for pt ALICIA transport). Pt completed bed

## 2025-06-03 NOTE — CONSULTS
Palliative Medicine  Patient Name: Francisca Norris  YOB: 1940  MRN: [id number]  Age: 84 y.o.  Gender: male    Date of Initial Consult: 6/3/2025  Date of Service: 6/3/2025  Time: 2:28 PM  Provider: Rylie Dugan MD  Hospital Day: 2  Admit Date: 6/2/2025  Referring Provider: Hospitalist      Reasons for Consultation:  Goals of Care    HISTORY OF PRESENT ILLNESS (HPI):   Francisca Norris is a 84 y.o. male with a past medical history of urinary retention, who was admitted on 6/2/2025 from home with a diagnosis of acute hypoxic failure in the setting of new hilar mass and liver mass concerning for metastatic malignancy.     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  His wife passed away in 2014        ASSESSMENT AND PLAN:   Thin gentleman, spent time getting to know him.  He is definitely shocked to hear about the possibility of cancer.  Many questions about tests that need to happen, etc.  Wants to know what this means for him.  Explained that we do not have any answers now, need to undergo biopsy, liver biopsy is being considered, brain MRI for staging.  Once all the answers are available, he wants to discuss with his daughter about whether or not he wants to proceed with cancer directed treatment.  He is concerned about his ability to tolerate treatment.  Talks about wanting quality of life, not wanting to burden his children.  Indicates that he wants to have protective DO NOT RESUSCITATE but again we will have this conversation in detail once workup has been completed.  I will arrange for a family meeting with his daughter once we have more information from further staging, biopsy, etc.  Patient would like to name his daughter Genoveva as primary medical power of .  Will also help him complete an AMD during this

## 2025-06-03 NOTE — CONSULTS
[facility] [place] [facility]  [address] [facility] [address] [address]  W: [phone] F: [phone]  Hematology/ Oncology Consult Note      Reason for consult:     Francisca Norris is a 84 y.o. male who we have been asked to see by Dr. Jean-Baptiste for new metastatic lung cancer with mets to the liver.    Subjective:     Francisca Norris is a 84 y.o.  male with PMHx significant for CAD, hypercholesterolemia, HTN, sciatica who presented to [facility] for complaints of weakness, back and leg pain. He complained he has had this pain for 2-3 weeks and is concerned about falling. CT imaging of the abdomen was done to rule out PE due to SOB requiring 2L nasal cannula. Imaging showed lung lesion with possible mets to the liver.     He is seen in bed s/p coude placement for urinary retention. He is on 2L nasal cannula and resting comfortably. Patient states he lives alone and performs ADL's. Endorses having unsteady gait over the last month and weight fluctuations. He recalls he \"lost a bunch of weight\" but was \"put on something to make me gain it back\". Denies night sweats, transient fevers. States his three daughters live nearby as well. Discussed with him the findings and need for further workup for staging and tissue identification. He states he is very claustrophobic and concerned for MRI but willing to try as he understands the benefit of staging. Also explained that we will discuss with pulmonary doctor about getting EBUS for lung evaluation and potential biopsy.       Review of Systems:  Pertinent items are noted in the History of Present Illness.       Past Medical History:   Diagnosis Date    CAD (coronary artery disease)     Hypercholesterolemia     Hypertension     Sciatica     STEMI (ST elevation myocardial infarction) (HCC) 08/07/2018    RCA     Past Surgical History:   Procedure Laterality Date    BACK SURGERY      PTCA  08/07/2018    PTCA/PAMELA RCA (STEMI)      No

## 2025-06-03 NOTE — PROGRESS NOTES
TRANSFER - OUT REPORT:    Verbal report given to Jazmyn on Francisca Norris  being transferred to  for routine progression of patient care       Report consisted of patient's Situation, Background, Assessment and   Recommendations(SBAR).     Information from the following report(s) Nurse Handoff Report was reviewed with the receiving nurse.           Lines:   Peripheral IV 06/02/25 Distal;Posterior;Right Forearm (Active)   Site Assessment Clean, dry & intact 06/03/25 0845   Line Status Flushed;Brisk blood return;Normal saline locked 06/03/25 0845   Line Care Cap changed;Connections checked and tightened;Line pulled back;Ports disinfected 06/03/25 0845   Phlebitis Assessment No symptoms 06/03/25 0845   Infiltration Assessment 0 06/03/25 0845   Alcohol Cap Used Yes 06/03/25 0845   Dressing Status Clean, dry & intact 06/03/25 0845   Dressing Type Transparent 06/03/25 0845   Dressing Intervention New 06/02/25 1200        Opportunity for questions and clarification was provided.      Patient transported with:  O2 @ 2lpm

## 2025-06-03 NOTE — CONSULTS
INTERVENTIONAL RADIOLOGY  PREOPERATIVE HISTORY AND PHYSICAL      Patient Name:  Francisca Norris  YOB: 1940   Age:  84 y.o.  Room/Bed:  [address] [place] [address]  Medical Record Number:  [id number]  Date:  6/3/2025   Time:  12:21 PM      CC / HPI:  Francisca Norris is a 84 y.o. male with a history of metastatic disease who presents for liver mass biopsy.      PAST MEDICAL HISTORY:  Past Medical History:   Diagnosis Date    CAD (coronary artery disease)     Hypercholesterolemia     Hypertension     Sciatica     STEMI (ST elevation myocardial infarction) (HCC) 2018    RCA       PAST SURGICAL HISTORY:  Past Surgical History:   Procedure Laterality Date    BACK SURGERY      PTCA  2018    PTCA/PAMELA RCA (STEMI)       SOCIAL HISTORY:  Social History     Socioeconomic History    Marital status:      Spouse name: Not on file    Number of children: Not on file    Years of education: Not on file    Highest education level: Not on file   Occupational History    Not on file   Tobacco Use    Smoking status: Former     Current packs/day: 0.00     Types: Cigarettes     Quit date: 1992     Years since quittin.8    Smokeless tobacco: Never   Substance and Sexual Activity    Alcohol use: No    Drug use: Never    Sexual activity: Not on file   Other Topics Concern    Not on file   Social History Narrative    Not on file     Social Drivers of Health     Financial Resource Strain: Not on file   Food Insecurity: Not on file   Transportation Needs: Not on file   Physical Activity: Not on file   Stress: Not on file   Social Connections: Not on file   Intimate Partner Violence: Not on file   Housing Stability: Not on file       FAMILY HISTORY:  No family history on file.      CURRENT MEDICATIONS:  Current Facility-Administered Medications   Medication Dose Route Frequency Provider Last Rate Last Admin    sodium chloride flush 0.9 % injection 5-40 mL  5-40 mL IntraVENous 2 times per day Megan Hancock APRN - NP   10  mL at 06/03/25 0858    sodium chloride flush 0.9 % injection 5-40 mL  5-40 mL IntraVENous PRN Megan Hancock APRN - NP        0.9 % sodium chloride infusion   IntraVENous PRN Megan Hancock APRN - NP        ondansetron (ZOFRAN-ODT) disintegrating tablet 4 mg  4 mg Oral Q8H PRN Megan Hancock APRN - NP        Or    ondansetron (ZOFRAN) injection 4 mg  4 mg IntraVENous Q6H PRN Megan Hancock APRN - NP        polyethylene glycol (GLYCOLAX) packet 17 g  17 g Oral Daily PRN Megan Hancock APRN - NP        acetaminophen (TYLENOL) tablet 650 mg  650 mg Oral Q6H PRN Megan Hancock APRN - NP        Or    acetaminophen (TYLENOL) suppository 650 mg  650 mg Rectal Q6H PRN Megan Hancock APRN - NP        [Held by provider] atorvastatin (LIPITOR) tablet 40 mg  40 mg Oral Nightly Megan Hacnock APRN - NP        [Held by provider] hydroCHLOROthiazide (HYDRODIURIL) tablet 25 mg  25 mg Oral Daily Megan Hancock APRN - NP        [Held by provider] losartan (COZAAR) tablet 25 mg  25 mg Oral Daily Megan Hancock APRN - NP        metoprolol succinate (TOPROL XL) extended release tablet 25 mg  25 mg Oral Daily Megan Hancock APRN - NP   25 mg at 06/03/25 0853    melatonin tablet 3 mg  3 mg Oral Nightly PRN Megan Hancock APRN - NP        0.9 % sodium chloride infusion   IntraVENous Continuous Megan Hancock APRN - NP 75 mL/hr at 06/03/25 0132 New Bag at 06/03/25 0132    HYDROmorphone HCl PF (DILAUDID) injection 0.5 mg  0.5 mg IntraVENous Once Aurelia Camarena APRN - NP        LORazepam (ATIVAN) 2 MG/ML concentrated solution 1 mg  1 mg SubLINGual ONCE PRN Ba Gannon APRN - NP        tamsulosin (FLOMAX) capsule 0.4 mg  0.4 mg Oral Daily Aurelia Camarena APRN - NP   0.4 mg at 06/03/25 1051       ALLERGIES:  No Known Allergies      IMAGING STUDIES:  Relevant Imaging studies reviewed      LABS:  Lab Results   Component Value Date/Time    WBC 6.9 06/03/2025 05:12 AM    HGB 10.9 06/03/2025 05:12 AM

## 2025-06-03 NOTE — PROGRESS NOTES
-Please complete MRI History and Safety Screening Form.     - Patient cannot be scanned until this form is completed and reviewed in MRI to ensure patient is SAFE and eligible for MRI.  - CALL MRI when this has been successfully completed at [phone].

## 2025-06-03 NOTE — PLAN OF CARE
Problem: Safety - Adult  Goal: Free from fall injury  6/3/2025 1138 by Jazmyn Mcmahan, RN  Outcome: Progressing  6/3/2025 0346 by France Ware, RN  Outcome: Progressing

## 2025-06-03 NOTE — PROGRESS NOTES
End of Shift Note    Bedside shift change report given to France COLLAZO (oncoming nurse) by Jazmyn Mcmahan RN (offgoing nurse).  Report included the following information SBAR, Kardex, Intake/Output, and MAR    Shift worked: 7A - 7P      Shift summary and any significant changes:    Pt tolerated care. Medications given per MAR. Orozco placed by urology for retention. 20mL inflated into balloon.     Patient had liver biopsy done today. Patient complained of right sie pain after biopsy - tylenol PRN given    MRI brain ordered - screening form sent. PRN Ativan to be given prior to MRI    Pulmonology and oncology consulted.     Family given updates. Caring rounds completed      Concerns for physician to address: Physicians, please call daughter, Genoveva, for all updates. Daughters also want to speak to .    Zone phone for oncoming shift:         Activity:  Level of Assistance: Moderate assist, patient does 50-74%    Cardiac:   Cardiac Monitoring:  yes - NSR     Access:  Current line(s): PIV     Genitourinary:   Urinary Status: Voiding, External catheter    Respiratory:   O2 Device: Nasal cannula    GI:  Current diet: ADULT DIET; Regular  ADULT ORAL NUTRITION SUPPLEMENT; Breakfast, Lunch, Dinner; Standard High Calorie/High Protein Oral Supplement    Pain Management:   Patient states pain is manageable on current regimen: n/a     Skin:  Brant Scale Score: 18  Interventions: Wound Offloading (Prevention Methods): Pillows, Repositioning, Turning, Elevate heels  Pressure injury: no    Patient Safety:  Fall Score: Aguero Total Score: 15  Fall Risk Interventions  Nursing Judgement-Fall Risk High(Add Comments): Yes  Toilet Every 2 Hours-In Advance of Need: Yes  Hourly Visual Checks: Awake, In bed  Fall Visual Posted: Fall sign posted, Socks  Room Door Open: Deferred to decrease stimulation  Alarm On: Bed  Patient Moved Closer to Nursing Station: No    Active Consults:  IP CONSULT TO PALLIATIVE CARE  IP CONSULT TO ONCOLOGY  IP

## 2025-06-03 NOTE — PROGRESS NOTES
End of Shift Note    Bedside shift change report given to Jazmyn COLLAZO (oncoming nurse) by France Ware RN (offgoing nurse).  Report included the following information SBAR, Kardex, MAR, and Recent Results    Shift worked: [id number]   Shift summary and any significant changes:    Pt admitted to unit ~2230-VS taken, admission assessment completed, dual skin check performed, scheduled meds given. Ordered labs drawn and sent. Pt bladder scan at 0350 showed 455 ml-MD notified, straight cath attempted unsuccessfully, MD ordered urojet and approved a coude catheter, coude catheter placement was unsuccessful. Urology consult ordered. Caring rounds completed.        France Ware RN

## 2025-06-03 NOTE — PROGRESS NOTES
Patient is having difficulty with urinary retention, 455cc in bladder with difficulty cathing patient. Will consult urology for further assistance.

## 2025-06-03 NOTE — CONSULTS
Patient known to us. Will see the patient.     Drew Ramírez MD, MRCP (), JAZMYN, FCCP, FCCM, ATSF  Interventional Pulmonology/Critical Care Medicine  [facility]

## 2025-06-03 NOTE — CONSULTS
[facility]        UROLOGY CONSULT NOTE     Patient: Francisca Norris MRN: [id number]  PCP: Rebekah Puri APRN - NP   :     1940  Age:   84 y.o.  Sex:  male      Requesting Provider: Eliecer Jean-Baptiste MD  Reason for consultation: 84 y.o. male with Metastatic cancer (HCC) [C79.9]  Acute hypoxic respiratory failure (HCC) [J96.01]    Admission Date: 2025 10:08 PM  LOS: 1 day     Code Status: Full Code   PCP: Rebekah Puri APRN - NP  - [phone]   Emergency Contact:  Primary Emergency Contact: Genoveva Mccullough, Home Phone: [phone]     Assessment:   Acute Urinary retention  Hx BPH with severe obstruction  s/p Greenlight laser TURP  with [facility] (Prostate volume 111 ml)  Prostatomegaly  Right hilar mass  Liver lesions with concern for metastatic disease    Recommendations:     18 fr coude catheter placed, patient may have some mild bleeding at meatus from catheter placement.  Frankel to stay in place for 5 days., will add Flomax 0.4 mg QD  Ok to perform voiding trial prior discharge if patient remains hospitalized, otherwise patient can have voiding trial outpatient at  if discharge prior to 5 days.  Urology will sign off at this time, please call with questions or concerns.    Thank you for this consult.   Reviewed with supervising MD: Dr. Valle  History of Present Illness:     Francisca Norris is a 84 y.o.   male is seen in consultation for urinary retention with difficult frankel placement at the request of Eliecer Jean-Baptiste MD. This is patient with PMH to include BPH with prior UR requiring frankel s/p green light procedure 2016, CAD, HLD, and HTN that presented to the ER with increase weakness and right leg pain. He underwent imagining which showed concern for metastatic disease and was transferred to [facility] for further evaluation.  The patient was noted to have urinary retention with bladder scans 450-466 ml and he was

## 2025-06-03 NOTE — PLAN OF CARE
Problem: Physical Therapy - Adult  Goal: By Discharge: Performs mobility at highest level of function for planned discharge setting.  See evaluation for individualized goals.  Description: FUNCTIONAL STATUS PRIOR TO ADMISSION: Patient was modified independent using a rolling walker for functional mobility, though initially reported does not use DME.     HOME SUPPORT PRIOR TO ADMISSION: The patient lived alone.    Physical Therapy Goals  Initiated 6/3/2025  1.  Patient will move from supine to sit and sit to supine, scoot up and down, and roll side to side in bed with modified independence within 7 day(s).    2.  Patient will perform sit to stand with modified independence within 7 day(s).  3.  Patient will transfer from bed to chair and chair to bed with modified independence using the least restrictive device within 7 day(s).  4.  Patient will ambulate with modified independence for 150 feet with the least restrictive device within 7 day(s).   5.  Patient will ascend/descend 3 stairs with 1 handrail(s) with modified independence within 7 day(s).   Outcome: Progressing     PHYSICAL THERAPY EVALUATION    Patient: Francisca Norris (84 y.o. male)  Date: 6/3/2025  Primary Diagnosis: Metastatic cancer (HCC) [C79.9]  Acute hypoxic respiratory failure (HCC) [J96.01]       Precautions:              ASSESSMENT :   DEFICITS/IMPAIRMENTS:   The patient is limited by decreased functional mobility, independence in ADLs, safety awareness, coordination, balance s/p admission for SOB, pain in LE's and weakness. Undergoing workup for R hilar metastasis from primary lung CA.    Based on the impairments listed above, patient tolerated mobility well on limited assessment to assist patient to transport stretcher for liver biopsy. SBA for bed mobility and CGA for transfers and gait of 10 ft, demonstrating wide RICARDA with shuffled steps. Patient is [place]. Patient may benefit from rehabilitation to promote a return to functional independence  for Transfers: Independent  Active : Yes    Cognitive/Behavioral Status:  Orientation  Overall Orientation Status: Within Functional Limits  Cognition  Overall Cognitive Status: Exceptions  Arousal/Alertness: Appears intact  Following Commands: Appears intact  Cognition Comment: Provided some inconsistencies in PLOF but may be attributed to [place]    Hearing:   Hearing  Hearing: Exceptions to WFL  Hearing Exceptions: Hard of hearing/hearing concerns    Vision/Perceptual:    Vision - Basic Assessment  Prior Vision: Wears glasses all the time  Patient Visual Report: No visual complaint reported.               Strength:    Strength: Within functional limits (Reports weakness in LLE more than RLE prior to admission)    Tone & Sensation:      Sensation: Intact    Coordination:  Coordination: Generally decreased, functional    Range Of Motion:  AROM: Within functional limits       Functional Mobility:  Bed Mobility:     Bed Mobility Training  Bed Mobility Training: Yes  Supine to Sit: Supervision  Scooting: Supervision  Transfers:     Transfer Training  Transfer Training: Yes  Sit to Stand: Contact guard assistance  Stand to Sit: Contact guard assistance  Bed to Chair: Contact guard assistance (w/ cueing for RW)  Balance:               Balance  Sitting: Intact  Standing: Impaired  Standing - Static: Constant support;Good  Standing - Dynamic: Fair;Constant support  Ambulation/Gait Training:                       Gait  Gait Training: Yes  Overall Level of Assistance: Contact guard assistance  Distance (ft): 12 Feet  Assistive Device: Walker, rolling;Gait belt  Interventions: Verbal cues  Base of Support: Widened  Speed/Chica: Pace decreased (< 100 feet/min)  Gait Abnormalities: Decreased step clearance;Shuffling gait

## 2025-06-03 NOTE — PROGRESS NOTES
Hospitalist Progress Note    NAME:   Francisca Norris   : 1940   MRN: [id number]     Date/Time: 6/3/2025 2:20 PM  Patient PCP: Rebekah Puri APRN - [facility]    Estimated discharge date:  Barriers:       Assessment / Plan:        Acute hypoxic respiratory failure POA- on 2 L/min nasal cannula oxygen  R hilar mass with compression of R pulmonary artery POA-   Continue telemetry  Taper oxygen as able  IP oncology consulted-following, MRI of brain for staging , ?liver biopsy  Inpatient pulmonology consulted- Will need EBUS-TBNA.   - , does not appear overloaded     CTA chest: 1. No CT evidence for pulmonary embolus at this time. Extrinsic compression of the right pulmonary arterial system and the right pulmonary veins by a right hilar mass.  2. Right hilar mass or clustered lymph nodes.  3. Mediastinal lymphadenopathy.  4. Incidental upper abdominal findings previously described.     Cirrhosis, transaminitis POA  Due to Liver lesions concerning for metastatic disease POA  -appreciate oncology assistance  -IR consulted for biopsy ?liver     Ultrasound abdomen:Cirrhotic change with scattered hypodensities consistent with metastatic disease.Imaging findings are concerning for neoplastic process/metastatic disease. Otherwise unremarkable examination.     CT abdomen pelvis:Numerous hypodense lesions in the liver, concerning for metastatic disease. 17 mm nonspecific left adrenal nodule. Posterior bladder wall thickening. Suggest cystoscopic evaluation to evaluate for mass.  No nephrolithiasis or hydronephrosis.     Acute kidney injury POA- Cr 1.7 today  -unclear baseline-most recent creatinine was in  and 0.97  -holding home losartan  -No nephrolithiasis or hydronephrosis on CT abd     Hypokalemia- resolved  S/p  Effer-K at [facility]  - Trend CMP and replete prn     Hypercalcemia  - Likely due to metastatic disease  Cont  IV hydration  - Trending down to 12.1 today     Generalized weakness

## 2025-06-03 NOTE — CONSULTS
Pulmonary Consult Note    Requesting Provider: Megan Murray NP    Reason for Consult: acute hypoxic respiratory failure, R hilar mass with compression of R pulmonary artery    Assessment   Acute hypoxic respiratory failure  R hilar mass with compression of R pulmonary artery  Mediastinal lymphadenopathy  Acute kidney injury  Cirrhosis  Liver lesions      Plan  Patient is requiring 2L supplemental oxygen. Titrate to keep O2 sats above 90%  CTA of chest showed a right hilar mass that is compressing the right pulm artery and mediastinal lymphadenopathy  Needs EBUS      Spoke with Betty Gannon NP with oncology    Radha Cline, ZACHARIAHP-C  [facility]   [phone] [facility]  [phone] ([phone] Office  [phone] Fax      HPI: Francisca Norris is a 84 y.o.  male with PMHx significant for CAD, hypercholesterolemia, HTN, sciatica who presented to [facility] for complaints of weakness, back and leg pain. He complained he has had this pain for 2-3 weeks and is concerned about falling. CT imaging of the abdomen was done to rule out PE due to SOB requiring 2L nasal cannula. Imaging showed lung lesion with possible mets to the liver.     The patient was a smoker for about 25 years, he quit in 1993. Smoked about 2 ppd.    He worked in a grocery store   No personal or family history of lung disease    Review of Systems: All other systems have been reviewed and are negative except per HPI    Past Medical History:  Past Medical History:   Diagnosis Date    CAD (coronary artery disease)     Hypercholesterolemia     Hypertension     Sciatica     STEMI (ST elevation myocardial infarction) (HCC) 08/07/2018    RCA       Social History:   reports that he quit smoking about 32 years ago. His smoking use included cigarettes. He has never used smokeless tobacco. He reports that he does not drink alcohol and does not use drugs.    Family History:  No family history on file.    Allergies:  No Known Allergies    Medications:  Current

## 2025-06-04 LAB
ALBUMIN SERPL-MCNC: 2.8 G/DL (ref 3.5–5)
ALBUMIN/GLOB SERPL: 0.8 (ref 1.1–2.2)
ALP SERPL-CCNC: 272 U/L (ref 45–117)
ALT SERPL-CCNC: 180 U/L (ref 12–78)
ANION GAP SERPL CALC-SCNC: 1 MMOL/L (ref 2–12)
AST SERPL-CCNC: 245 U/L (ref 15–37)
BASOPHILS # BLD: 0 K/UL (ref 0–0.1)
BASOPHILS NFR BLD: 0 % (ref 0–1)
BILIRUB SERPL-MCNC: 0.8 MG/DL (ref 0.2–1)
BUN SERPL-MCNC: 34 MG/DL (ref 6–20)
BUN/CREAT SERPL: 17 (ref 12–20)
CALCIUM SERPL-MCNC: 12.3 MG/DL (ref 8.5–10.1)
CHLORIDE SERPL-SCNC: 112 MMOL/L (ref 97–108)
CK SERPL-CCNC: 135 U/L (ref 39–308)
CO2 SERPL-SCNC: 26 MMOL/L (ref 21–32)
CREAT SERPL-MCNC: 1.95 MG/DL (ref 0.7–1.3)
DIFFERENTIAL METHOD BLD: ABNORMAL
EOSINOPHIL # BLD: 0 K/UL (ref 0–0.4)
EOSINOPHIL NFR BLD: 0 % (ref 0–7)
ERYTHROCYTE [DISTWIDTH] IN BLOOD BY AUTOMATED COUNT: 14.3 % (ref 11.5–14.5)
GLOBULIN SER CALC-MCNC: 3.4 G/DL (ref 2–4)
GLUCOSE SERPL-MCNC: 117 MG/DL (ref 65–100)
HCT VFR BLD AUTO: 32.6 % (ref 36.6–50.3)
HGB BLD-MCNC: 10.5 G/DL (ref 12.1–17)
IMM GRANULOCYTES # BLD AUTO: 0 K/UL (ref 0–0.04)
IMM GRANULOCYTES NFR BLD AUTO: 0 % (ref 0–0.5)
LYMPHOCYTES # BLD: 0.57 K/UL (ref 0.8–3.5)
LYMPHOCYTES NFR BLD: 9 % (ref 12–49)
MAGNESIUM SERPL-MCNC: 1.3 MG/DL (ref 1.6–2.4)
MCH RBC QN AUTO: 29.6 PG (ref 26–34)
MCHC RBC AUTO-ENTMCNC: 32.2 G/DL (ref 30–36.5)
MCV RBC AUTO: 91.8 FL (ref 80–99)
METAMYELOCYTES NFR BLD MANUAL: 1 %
MONOCYTES # BLD: 0.5 K/UL (ref 0–1)
MONOCYTES NFR BLD: 8 % (ref 5–13)
NEUTS BAND NFR BLD MANUAL: 3 %
NEUTS SEG # BLD: 5.17 K/UL (ref 1.8–8)
NEUTS SEG NFR BLD: 79 % (ref 32–75)
NRBC # BLD: 0.05 K/UL (ref 0–0.01)
NRBC BLD-RTO: 0.8 PER 100 WBC
PHOSPHATE SERPL-MCNC: 3 MG/DL (ref 2.6–4.7)
PLATELET # BLD AUTO: 139 K/UL (ref 150–400)
PLATELET COMMENT: ABNORMAL
PMV BLD AUTO: 12.1 FL (ref 8.9–12.9)
POTASSIUM SERPL-SCNC: 3.7 MMOL/L (ref 3.5–5.1)
PROT SERPL-MCNC: 6.2 G/DL (ref 6.4–8.2)
RBC # BLD AUTO: 3.55 M/UL (ref 4.1–5.7)
RBC MORPH BLD: ABNORMAL
SODIUM SERPL-SCNC: 139 MMOL/L (ref 136–145)
WBC # BLD AUTO: 6.3 K/UL (ref 4.1–11.1)

## 2025-06-04 PROCEDURE — 2500000003 HC RX 250 WO HCPCS

## 2025-06-04 PROCEDURE — 2580000003 HC RX 258: Performed by: STUDENT IN AN ORGANIZED HEALTH CARE EDUCATION/TRAINING PROGRAM

## 2025-06-04 PROCEDURE — 6370000000 HC RX 637 (ALT 250 FOR IP)

## 2025-06-04 PROCEDURE — 85025 COMPLETE CBC W/AUTO DIFF WBC: CPT

## 2025-06-04 PROCEDURE — 83735 ASSAY OF MAGNESIUM: CPT

## 2025-06-04 PROCEDURE — 97530 THERAPEUTIC ACTIVITIES: CPT

## 2025-06-04 PROCEDURE — 6370000000 HC RX 637 (ALT 250 FOR IP): Performed by: NURSE PRACTITIONER

## 2025-06-04 PROCEDURE — 36415 COLL VENOUS BLD VENIPUNCTURE: CPT

## 2025-06-04 PROCEDURE — 80053 COMPREHEN METABOLIC PANEL: CPT

## 2025-06-04 PROCEDURE — 94760 N-INVAS EAR/PLS OXIMETRY 1: CPT

## 2025-06-04 PROCEDURE — 2700000000 HC OXYGEN THERAPY PER DAY

## 2025-06-04 PROCEDURE — 97535 SELF CARE MNGMENT TRAINING: CPT

## 2025-06-04 PROCEDURE — 84100 ASSAY OF PHOSPHORUS: CPT

## 2025-06-04 PROCEDURE — 1100000000 HC RM PRIVATE

## 2025-06-04 PROCEDURE — 82550 ASSAY OF CK (CPK): CPT

## 2025-06-04 PROCEDURE — 97116 GAIT TRAINING THERAPY: CPT

## 2025-06-04 RX ORDER — SODIUM CHLORIDE, SODIUM LACTATE, POTASSIUM CHLORIDE, AND CALCIUM CHLORIDE .6; .31; .03; .02 G/100ML; G/100ML; G/100ML; G/100ML
500 INJECTION, SOLUTION INTRAVENOUS ONCE
Status: COMPLETED | OUTPATIENT
Start: 2025-06-04 | End: 2025-06-04

## 2025-06-04 RX ADMIN — SODIUM CHLORIDE, SODIUM LACTATE, POTASSIUM CHLORIDE, AND CALCIUM CHLORIDE 500 ML: .6; .31; .03; .02 INJECTION, SOLUTION INTRAVENOUS at 13:36

## 2025-06-04 RX ADMIN — SODIUM CHLORIDE, PRESERVATIVE FREE 10 ML: 5 INJECTION INTRAVENOUS at 09:31

## 2025-06-04 RX ADMIN — SODIUM CHLORIDE, PRESERVATIVE FREE 10 ML: 5 INJECTION INTRAVENOUS at 21:27

## 2025-06-04 RX ADMIN — METOPROLOL SUCCINATE 25 MG: 25 TABLET, EXTENDED RELEASE ORAL at 09:31

## 2025-06-04 RX ADMIN — TAMSULOSIN HYDROCHLORIDE 0.4 MG: 0.4 CAPSULE ORAL at 09:30

## 2025-06-04 ASSESSMENT — PAIN SCALES - GENERAL: PAINLEVEL_OUTOF10: 0

## 2025-06-04 NOTE — PLAN OF CARE
Problem: Occupational Therapy - Adult  Goal: By Discharge: Performs self-care activities at highest level of function for planned discharge setting.  See evaluation for individualized goals.  Description: FUNCTIONAL STATUS PRIOR TO ADMISSION:  pt stated independence w/ ADLs and mod I for mobility w/ RW   , Prior Level of Assist for ADLs: Independent,  ,  ,  ,  ,  , Prior Level of Assist for Homemaking: Independent,  , Prior Level of Assist for Transfers: Independent, Active : Yes     HOME SUPPORT: pt lived alone     Occupational Therapy Goals:  Initiated 6/3/2025  1.  Patient will perform upper body dressing with Bryant within 7 day(s).  2.  Patient will perform grooming with Modified Bryant within 7 day(s).  3.  Patient will perform lower body dressing with Modified Bryant within 7 day(s).  4.  Patient will perform toilet transfers with Modified [place]  within 7 day(s).  5.  Patient will perform all aspects of toileting with Bryant within 7 day(s).  6.  Patient will participate in upper extremity therapeutic exercise/activities with Supervision for 7 minutes within 7 day(s).    7.  Patient will utilize energy conservation techniques during functional activities with verbal cues within 7 day(s).   Outcome: Progressing   OCCUPATIONAL THERAPY TREATMENT  Patient: Francisca Norris (84 y.o. male)  Date: 6/4/2025  Primary Diagnosis: Metastatic cancer (HCC) [C79.9]  Acute hypoxic respiratory failure (HCC) [J96.01]       Precautions:                  Chart, occupational therapy assessment, plan of care, and goals were reviewed.    ASSESSMENT  Patient continues to benefit from skilled OT services and is slowly progressing towards goals. Patient received resting in bed on 2L, drowsy but rousing easily and amenable to session. Increased cues needed for motor planning today with patient benefiting from HOB elevated and min assist to achieve transfer to EOB. Although stands from EOB with CGA

## 2025-06-04 NOTE — PLAN OF CARE
Problem: Safety - Adult  Goal: Free from fall injury  6/3/2025 2253 by France Ware, RN  Outcome: Progressing  6/3/2025 1138 by Jazmyn Mcmahan, RN  Outcome: Progressing     Problem: Pain  Goal: Verbalizes/displays adequate comfort level or baseline comfort level  Outcome: Progressing     Problem: Skin/Tissue Integrity  Goal: Skin integrity remains intact  Description: 1.  Monitor for areas of redness and/or skin breakdown2.  Assess vascular access sites hourly3.  Every 4-6 hours minimum:  Change oxygen saturation probe site4.  Every 4-6 hours:  If on nasal continuous positive airway pressure, respiratory therapy assess nares and determine need for appliance change or resting period  Outcome: Progressing

## 2025-06-04 NOTE — PROGRESS NOTES
Pulmonary Progress Note    C/c: acute hypoxic respiratory failure, R hilar mass with compression of R pulmonary artery    Assessment   Acute hypoxic respiratory failure  R hilar mass with compression of R pulmonary artery  Mediastinal lymphadenopathy  Acute kidney injury  Cirrhosis  Liver lesions      Plan  Patient is requiring 2L supplemental oxygen. Titrate to keep O2 sats above 90%  CTA of chest showed a right hilar mass that is compressing the right pulm artery and mediastinal lymphadenopathy  Liver biopsy performed yesterday, report showed small cell carcinoma, positive for malignancy.  Needs bronchoscopy/EBUS, we will schedule this outpatient for 6/10.  If patient remains in the hospital Dr. Ramírez will do [facility]/EBUS on Monday.          Radha Cline, ZACHARIAHP-C  [facility]   [phone] [facility]  [phone] ([phone]) Office  [phone] Fax        Hospital interval:  6/4 -patient lying comfortably in bed on room air.  No events overnight.  Discussed plan with patient regarding bronchoscopy/EBUS.       HPI: Francisca Norris is a 84 y.o.  male with PMHx significant for CAD, hypercholesterolemia, HTN, sciatica who presented to [facility] for complaints of weakness, back and leg pain. He complained he has had this pain for 2-3 weeks and is concerned about falling. CT imaging of the abdomen was done to rule out PE due to SOB requiring 2L nasal cannula. Imaging showed lung lesion with possible mets to the liver.     The patient was a smoker for about 25 years, he quit in 1993. Smoked about 2 ppd.    He worked in a grocery store   No personal or family history of lung disease    Review of Systems: All other systems have been reviewed and are negative except per HPI    Past Medical History:  Past Medical History:   Diagnosis Date    CAD (coronary artery disease)     Hypercholesterolemia     Hypertension     Sciatica     STEMI (ST elevation myocardial infarction) (HCC) 08/07/2018    RCA       Social History:   reports that

## 2025-06-04 NOTE — PLAN OF CARE
Problem: Physical Therapy - Adult  Goal: By Discharge: Performs mobility at highest level of function for planned discharge setting.  See evaluation for individualized goals.  Description: FUNCTIONAL STATUS PRIOR TO ADMISSION: Patient was modified independent using a rolling walker for functional mobility, though initially reported does not use DME.     HOME SUPPORT PRIOR TO ADMISSION: The patient lived alone.    Physical Therapy Goals  Initiated 6/3/2025  1.  Patient will move from supine to sit and sit to supine, scoot up and down, and roll side to side in bed with modified independence within 7 day(s).    2.  Patient will perform sit to stand with modified independence within 7 day(s).  3.  Patient will transfer from bed to chair and chair to bed with modified independence using the least restrictive device within 7 day(s).  4.  Patient will ambulate with modified independence for 150 feet with the least restrictive device within 7 day(s).   5.  Patient will ascend/descend 3 stairs with 1 handrail(s) with modified independence within 7 day(s).   Outcome: Progressing   PHYSICAL THERAPY TREATMENT    Patient: Francisca Norris (84 y.o. male)  Date: 6/4/2025  Diagnosis: Metastatic cancer (HCC) [C79.9]  Acute hypoxic respiratory failure (HCC) [J96.01] Acute hypoxic respiratory failure (HCC)      Precautions: Restrictions/Precautions  Restrictions/Precautions: Fall Risk, Bed Alarm            ASSESSMENT:  Patient continues to benefit from skilled PT services and is slowly progressing towards goals however he did need some increased assist for transfers and amb this session. He is on 2L O2 NC and sats remained stable. He needed min A for bed mobility with bed modified, min A to stand but was tending to use legs against bed to assist. He was able to take steps with the RW and was initially min A but on the turn to the chair and backing up for sitting, he required increased assist to mod A of 1. Pt was left in chair with  assistance  Transfers:  Transfer Training  Transfer Training: Yes  Sit to Stand: Contact guard assistance (uses bed on back of legs to assist)  Stand to Sit: Contact guard assistance;Minimal assistance (bracing LEs against bed frame; verbal cues for hand placement)  Stand Pivot Transfers: Minimal assistance  Bed to Chair: Partial/Moderate assistance (using RW; short distance ambulation)  Balance:  Balance  Sitting: Impaired  Sitting - Static: Good (unsupported)  Sitting - Dynamic: Fair (occasional);Good (unsupported)  Standing: Impaired  Standing - Static: Fair;Constant support  Standing - Dynamic: Fair;Poor;Constant support   Ambulation/Gait Training:     Gait  Gait Training: Yes  Overall Level of Assistance: Minimal assistance;Partial/Moderate assistance  Distance (ft): 6 Feet  Assistive Device: Walker, rolling;Gait belt  Interventions: Safety awareness training;Tactile cues;Verbal cues;Manual cues  Base of Support: Widened  Speed/Chica: Pace decreased (< 100 feet/min)  Step Length: Right shortened;Left shortened  Gait Abnormalities: Decreased step clearance;Shuffling gait;Trunk sway increased         Pain Rating:  No c/o    Activity Tolerance:   Fair     After treatment:   Patient left in no apparent distress sitting up in chair, Call bell within reach, Bed/ chair alarm activated, Caregiver / family present, Heels elevated for pressure relief, and Updated patient's board on functional status and mobility recommendations      COMMUNICATION/EDUCATION:   The patient's plan of care was discussed with: occupational therapist and registered nurse    Patient Education  Education Given To: Patient  Education Provided: Role of Therapy;Plan of Care;Transfer Training;Mobility Training  Education Provided Comments: gait with RW  Education Method: Demonstration;Verbal;Teach Back  Barriers to Learning: Cognition;Other (Comment) (slower processing)  Education Outcome: Verbalized understanding;Continued education needed

## 2025-06-04 NOTE — PROGRESS NOTES
Comprehensive Nutrition Assessment    Type and Reason for Visit:  Initial, Positive nutrition screen    Nutrition Recommendations/Plan:   Continue current diet  Encourage PO intakes, honor preferences as able, provide assistance PRN  Ensure plus HP 3x/day  Monitor and record PO intakes, supplement acceptance, and Bms in I/Os     Malnutrition Assessment:  Malnutrition Status:  At risk for malnutrition (06/04/25 0911)    Context:  Acute Illness     Findings of the 6 clinical characteristics of malnutrition:  Energy Intake:  Mild decrease in energy intake  Weight Loss:  No weight loss     Body Fat Loss:  Unable to assess     Muscle Mass Loss:  Unable to assess    Fluid Accumulation:  No fluid accumulation     Strength:  Not Performed    Nutrition Assessment:    Admitted for acute hypoxic respiratory failure. S/p liver bx. PMHx includes CAD, hypercholesterolemia, HTN, sciatica, STEMI. Screened for MST3, pt unsure of weight loss, +decreased appetite/intakes. No documented intakes, appropriate ONS in place. No recent significant weight loss per EMR wt hx.    Nutrition Related Findings:    Labs: Na 139, K 3.7, BUN 34, Creat 1.95, Gluc 117, Mag 1.3, Phos 3.0. Meds: atorvastatin, hydrochlorothiazide. No edema. BM PTA. Wound Type: None       Current Nutrition Intake & Therapies:    ADULT DIET; Regular  ADULT ORAL NUTRITION SUPPLEMENT; Breakfast, Lunch, Dinner; Standard High Calorie/High Protein Oral Supplement    Anthropometric Measures:  Height: 175.3 cm (5' 9.02\")  Ideal Body Weight (IBW): 160 lbs (73 kg)    Current Body Weight: 75 kg (165 lb 5.5 oz), 103.3 % IBW. Weight Source: Not specified  Current BMI (kg/m2): 24.4  BMI Categories: Normal Weight (BMI 22.0 to 24.9) age over 65    Estimated Daily Nutrient Needs:  Energy Requirements Based On: Kcal/kg  Weight Used for Energy Requirements: Current  Energy (kcal/day): 1875-2250kcal (25-30kcal/kg)  Weight Used for Protein Requirements: Current  Protein (g/day): 90g

## 2025-06-04 NOTE — PROGRESS NOTES
End of Shift Note    Bedside shift change report given to MARIA TERESA Bran (oncoming nurse) by BLAS PLASCENCIA, ZAY (offgoing nurse).  Report included the following information SBAR, Kardex, MAR, and Recent Results    Shift worked:  [id number]     Shift summary and any significant changes:     Patient received medication per MAR. Education provided. Patient is x 1 with a walker. Orozco care completed. Caring rounds completed.     Concerns for physician to address:  None     Zone phone for oncoming shift:   [phone]       Activity:  Level of Assistance: Maximum assist, patient does 25-49%  Number times ambulated in hallways past shift: 0  Number of times OOB to chair past shift: 1    Cardiac:   Cardiac Monitoring: Yes      Cardiac Rhythm: Sinus rhythm (PAC'S/PVC'S)    Access:  Current line(s): PIV     Genitourinary:   Urinary Status: Voiding, Orozco    Respiratory:   O2 Device: Nasal cannula  Chronic home O2 use?: NO  Incentive spirometer at bedside: NO    GI:     Current diet:  ADULT DIET; Regular  ADULT ORAL NUTRITION SUPPLEMENT; Breakfast, Lunch, Dinner; Standard High Calorie/High Protein Oral Supplement  Passing flatus: YES    Pain Management:   Patient states pain is manageable on current regimen: YES    Skin:  Brant Scale Score: 17  Interventions: Wound Offloading (Prevention Methods): Turning, Pillows    Patient Safety:  Fall Risk: Nursing Judgement-Fall Risk High(Add Comments): Yes  Fall Risk Interventions  Nursing Judgement-Fall Risk High(Add Comments): Yes  Toilet Every 2 Hours-In Advance of Need: Yes  Hourly Visual Checks: Eyes closed, In bed  Fall Visual Posted: Armband, Socks  Room Door Open: Yes  Alarm On: Bed  Patient Moved Closer to Nursing Station: No    Active Consults:   IP CONSULT TO PALLIATIVE CARE  IP CONSULT TO ONCOLOGY  IP CONSULT TO INTERVENTIONAL RADIOLOGY  IP CONSULT TO PULMONOLOGY  IP CONSULT TO UROLOGY    Length of Stay:  Expected LOS: 5  Actual LOS: 2    BLAS PLASCENCIA, RN

## 2025-06-04 NOTE — PROGRESS NOTES
End of Shift Note    Bedside shift change report given to Gricelda COLLAZO (oncoming nurse) by France Ware RN (offgoing nurse).  Report included the following information SBAR, Kardex, MAR, and Recent Results    Shift worked: [id number]   Shift summary and any significant changes:    Pt given ativan right before shift for MRI--pt too unstable to walk to transport stretcher- Pt came back from MRI and had new onset AMS shortly after after trying to get out of bed-VS taken which showed a BP of 88/53-NS bolus of 500 ml ordered and administered. Pt alert and oriented shortly after. Q2 turns maintained. Orozco care completed. Pt set bed alarm off ~0630 because he needed to go to the bathroom- Pt remained oriented but impulsive- Pt educated on falls. Ordered labs drawn and sent. Caring rounds completed.        France Ware RN

## 2025-06-05 ENCOUNTER — APPOINTMENT (OUTPATIENT)
Facility: HOSPITAL | Age: 85
DRG: 180 | End: 2025-06-05
Attending: STUDENT IN AN ORGANIZED HEALTH CARE EDUCATION/TRAINING PROGRAM
Payer: MEDICARE

## 2025-06-05 LAB
ALBUMIN SERPL-MCNC: 2.5 G/DL (ref 3.5–5)
ALBUMIN/GLOB SERPL: 0.8 (ref 1.1–2.2)
ALP SERPL-CCNC: 276 U/L (ref 45–117)
ALT SERPL-CCNC: 161 U/L (ref 12–78)
ANION GAP SERPL CALC-SCNC: 4 MMOL/L (ref 2–12)
AST SERPL-CCNC: 212 U/L (ref 15–37)
BASOPHILS # BLD: 0 K/UL (ref 0–0.1)
BASOPHILS NFR BLD: 0 % (ref 0–1)
BILIRUB SERPL-MCNC: 0.9 MG/DL (ref 0.2–1)
BUN SERPL-MCNC: 44 MG/DL (ref 6–20)
BUN/CREAT SERPL: 24 (ref 12–20)
CALCIUM SERPL-MCNC: 12.6 MG/DL (ref 8.5–10.1)
CHLORIDE SERPL-SCNC: 113 MMOL/L (ref 97–108)
CO2 SERPL-SCNC: 24 MMOL/L (ref 21–32)
CREAT SERPL-MCNC: 1.84 MG/DL (ref 0.7–1.3)
DIFFERENTIAL METHOD BLD: ABNORMAL
EOSINOPHIL # BLD: 0.12 K/UL (ref 0–0.4)
EOSINOPHIL NFR BLD: 2 % (ref 0–7)
ERYTHROCYTE [DISTWIDTH] IN BLOOD BY AUTOMATED COUNT: 14.5 % (ref 11.5–14.5)
GLOBULIN SER CALC-MCNC: 3.3 G/DL (ref 2–4)
GLUCOSE SERPL-MCNC: 86 MG/DL (ref 65–100)
HCT VFR BLD AUTO: 31 % (ref 36.6–50.3)
HGB BLD-MCNC: 9.8 G/DL (ref 12.1–17)
IMM GRANULOCYTES # BLD AUTO: 0 K/UL (ref 0–0.04)
IMM GRANULOCYTES NFR BLD AUTO: 0 % (ref 0–0.5)
LYMPHOCYTES # BLD: 0.81 K/UL (ref 0.8–3.5)
LYMPHOCYTES NFR BLD: 14 % (ref 12–49)
MCH RBC QN AUTO: 29.4 PG (ref 26–34)
MCHC RBC AUTO-ENTMCNC: 31.6 G/DL (ref 30–36.5)
MCV RBC AUTO: 93.1 FL (ref 80–99)
MONOCYTES # BLD: 0.23 K/UL (ref 0–1)
MONOCYTES NFR BLD: 4 % (ref 5–13)
NEUTS BAND NFR BLD MANUAL: 4 %
NEUTS SEG # BLD: 4.64 K/UL (ref 1.8–8)
NEUTS SEG NFR BLD: 76 % (ref 32–75)
NRBC # BLD: 0.06 K/UL (ref 0–0.01)
NRBC BLD-RTO: 1 PER 100 WBC
PLATELET # BLD AUTO: 155 K/UL (ref 150–400)
PMV BLD AUTO: 12.5 FL (ref 8.9–12.9)
POTASSIUM SERPL-SCNC: 3.5 MMOL/L (ref 3.5–5.1)
PROT SERPL-MCNC: 5.8 G/DL (ref 6.4–8.2)
RBC # BLD AUTO: 3.33 M/UL (ref 4.1–5.7)
RBC MORPH BLD: ABNORMAL
SODIUM SERPL-SCNC: 141 MMOL/L (ref 136–145)
WBC # BLD AUTO: 5.8 K/UL (ref 4.1–11.1)

## 2025-06-05 PROCEDURE — 2700000000 HC OXYGEN THERAPY PER DAY

## 2025-06-05 PROCEDURE — 97530 THERAPEUTIC ACTIVITIES: CPT

## 2025-06-05 PROCEDURE — 94760 N-INVAS EAR/PLS OXIMETRY 1: CPT

## 2025-06-05 PROCEDURE — 99222 1ST HOSP IP/OBS MODERATE 55: CPT | Performed by: SURGERY

## 2025-06-05 PROCEDURE — 97535 SELF CARE MNGMENT TRAINING: CPT

## 2025-06-05 PROCEDURE — 78306 BONE IMAGING WHOLE BODY: CPT | Performed by: NURSE PRACTITIONER

## 2025-06-05 PROCEDURE — 6370000000 HC RX 637 (ALT 250 FOR IP)

## 2025-06-05 PROCEDURE — 36415 COLL VENOUS BLD VENIPUNCTURE: CPT

## 2025-06-05 PROCEDURE — A9503 TC99M MEDRONATE: HCPCS | Performed by: NURSE PRACTITIONER

## 2025-06-05 PROCEDURE — 85025 COMPLETE CBC W/AUTO DIFF WBC: CPT

## 2025-06-05 PROCEDURE — 80053 COMPREHEN METABOLIC PANEL: CPT

## 2025-06-05 PROCEDURE — 2580000003 HC RX 258: Performed by: STUDENT IN AN ORGANIZED HEALTH CARE EDUCATION/TRAINING PROGRAM

## 2025-06-05 PROCEDURE — 3430000000 HC RX DIAGNOSTIC RADIOPHARMACEUTICAL: Performed by: NURSE PRACTITIONER

## 2025-06-05 PROCEDURE — 1100000000 HC RM PRIVATE

## 2025-06-05 PROCEDURE — 2500000003 HC RX 250 WO HCPCS

## 2025-06-05 PROCEDURE — 6370000000 HC RX 637 (ALT 250 FOR IP): Performed by: NURSE PRACTITIONER

## 2025-06-05 RX ORDER — TC 99M MEDRONATE 20 MG/10ML
21.4 INJECTION, POWDER, LYOPHILIZED, FOR SOLUTION INTRAVENOUS
Status: COMPLETED | OUTPATIENT
Start: 2025-06-05 | End: 2025-06-05

## 2025-06-05 RX ORDER — SODIUM CHLORIDE, SODIUM LACTATE, POTASSIUM CHLORIDE, CALCIUM CHLORIDE 600; 310; 30; 20 MG/100ML; MG/100ML; MG/100ML; MG/100ML
INJECTION, SOLUTION INTRAVENOUS CONTINUOUS
Status: DISCONTINUED | OUTPATIENT
Start: 2025-06-05 | End: 2025-06-10

## 2025-06-05 RX ADMIN — TAMSULOSIN HYDROCHLORIDE 0.4 MG: 0.4 CAPSULE ORAL at 09:36

## 2025-06-05 RX ADMIN — SODIUM CHLORIDE, SODIUM LACTATE, POTASSIUM CHLORIDE, AND CALCIUM CHLORIDE: .6; .31; .03; .02 INJECTION, SOLUTION INTRAVENOUS at 22:05

## 2025-06-05 RX ADMIN — METOPROLOL SUCCINATE 25 MG: 25 TABLET, EXTENDED RELEASE ORAL at 09:36

## 2025-06-05 RX ADMIN — TC 99M MEDRONATE 21.4 MILLICURIE: 20 INJECTION, POWDER, LYOPHILIZED, FOR SOLUTION INTRAVENOUS at 07:55

## 2025-06-05 RX ADMIN — SODIUM CHLORIDE, PRESERVATIVE FREE 10 ML: 5 INJECTION INTRAVENOUS at 09:36

## 2025-06-05 ASSESSMENT — PAIN SCALES - GENERAL: PAINLEVEL_OUTOF10: 0

## 2025-06-05 NOTE — CONSULTS
Consult Note            Date:6/5/2025        Patient Name:Francisca Norris     YOB: 1940     Age:84 y.o.    Inpatient consult to General Surgery  Consult performed by: Donte Rios MD  Consult ordered by: Ba Gannon APRN - NP  Reason for consult: lung ca          Chief Complaint   No chief complaint on file.     ”SOB, lung cancer”    History Obtained From   patient, family member - daughter, electronic medical record    History of Present Illness   The patient presented to the ER with shortness of breath.  He was admitted to the hospital and worked up found to have lung cancer with liver metastases.  He is previously remote smoker but not current smoker.  He reports ongoing shortness of breath.  Currently is on 2 L of oxygen.  On imaging he was found to have numerous liver metastases as well as disease in the lung and mediastinum.  He reports a 10 to 15 pound weight loss over the last several weeks due to decreased appetite.  He also reports decreased energy.  He has a little bit of a cough.  He denies hematemesis.,  Bone pain, leg swelling, nausea and vomiting diarrhea melena constipation.    Past Medical History     Past Medical History:   Diagnosis Date    CAD (coronary artery disease)     Hypercholesterolemia     Hypertension     Sciatica     STEMI (ST elevation myocardial infarction) (HCC) 08/07/2018    RCA        Past Surgical History     Past Surgical History:   Procedure Laterality Date    BACK SURGERY      PTCA  08/07/2018    PTCA/PAMELA RCA (STEMI)        Medications     Prior to Admission medications    Medication Sig Start Date End Date Taking? Authorizing Provider   aspirin (ASPIRIN ADULT LOW STRENGTH) 81 MG EC tablet Take 1 tablet by mouth daily 5/15/25   Mark Cueto MD   metoprolol succinate (TOPROL XL) 25 MG extended release tablet Take 1 tablet by mouth daily 3/19/25   aMrk Cueto MD   nitroGLYCERIN (NITROSTAT) 0.4 MG SL tablet Place 1 tablet under the tongue  are normal. There is no distension.      Palpations: There is no mass.      Tenderness: There is no abdominal tenderness. There is no guarding or rebound.      Hernia: No hernia is present.   Musculoskeletal:         General: No swelling or tenderness. Normal range of motion.      Cervical back: Normal range of motion and neck supple.   Lymphadenopathy:      Cervical: No cervical adenopathy.      Upper Body:      Right upper body: No supraclavicular adenopathy.      Left upper body: No supraclavicular adenopathy.   Skin:     Coloration: Skin is not jaundiced.      Findings: No erythema or rash.   Neurological:      Mental Status: He is alert and oriented to person, place, and time.      Cranial Nerves: No cranial nerve deficit.      Coordination: Coordination normal.      Gait: Gait normal.   Psychiatric:         Behavior: Behavior normal.         Thought Content: Thought content normal.         Judgment: Judgment normal.         Labs    CBC:  Recent Labs     06/03/25  0512 06/04/25 0102 06/05/25  0601   WBC 6.9 6.3 5.8   RBC 3.65* 3.55* 3.33*   HGB 10.9* 10.5* 9.8*   HCT 34.0* 32.6* 31.0*   MCV 93.2 91.8 93.1   RDW 14.3 14.3 14.5    139* 155     CHEMISTRIES:  Recent Labs     06/03/25  0512 06/04/25  0102 06/05/25  0601    139 141   K 3.5 3.7 3.5   * 112* 113*   CO2 26 26 24   BUN 32* 34* 44*   CREATININE 1.76* 1.95* 1.84*   GLUCOSE 98 117* 86   PHOS  --  3.0  --    MG  --  1.3*  --      PT/INR:  Recent Labs     06/03/25  0925   PROTIME 11.5*   INR 1.1     APTT:No results for input(s): \"APTT\" in the last 72 hours.  LIVER PROFILE:  Recent Labs     06/03/25  0512 06/04/25  0102 06/05/25  0601   * 245* 212*   * 180* 161*   BILITOT 0.5 0.8 0.9   ALKPHOS 237* 272* 276*       Imaging/Diagnostics   NM BONE SCAN WHOLE BODY  Result Date: 6/5/2025  Unremarkable Whole Body Nuclear Bone Scan. Electronically signed by NIKHIL ANDERSON    MRI BRAIN W WO CONTRAST  Result Date: 6/3/2025  1. Multiple

## 2025-06-05 NOTE — PROGRESS NOTES
Pulmonary Progress Note    C/c: acute hypoxic respiratory failure, R hilar mass with compression of R pulmonary artery    Assessment   Acute hypoxic respiratory failure  R hilar mass with compression of R pulmonary artery  Mediastinal lymphadenopathy  Acute kidney injury  Cirrhosis  Liver lesions      Plan  Patient is requiring 2L supplemental oxygen. Titrate to keep O2 sats above 90%  CTA of chest showed a right hilar mass that is compressing the right pulm artery and mediastinal lymphadenopathy  Liver biopsy performed yesterday, report showed small cell carcinoma, positive for malignancy.  Needs bronchoscopy/EBUS, we will schedule this outpatient for 6/10.  If patient remains in the hospital Dr. Ramírez will do [facility]/EBUS on Monday.          Radha Cline, ZACHARIAHP-C  [facility]   [phone] [facility]  [phone] ([phone]) Office  [phone] Fax        Hospital interval:  6/4 -patient lying comfortably in bed.  No events overnight.  Discussed plan with patient regarding bronchoscopy/EBUS.   6/5 -patient sitting comfortably in bed on 2 L supplemental oxygen, no distress.      HPI: Francisca Norris is a 84 y.o.  male with PMHx significant for CAD, hypercholesterolemia, HTN, sciatica who presented to [facility] for complaints of weakness, back and leg pain. He complained he has had this pain for 2-3 weeks and is concerned about falling. CT imaging of the abdomen was done to rule out PE due to SOB requiring 2L nasal cannula. Imaging showed lung lesion with possible mets to the liver.     The patient was a smoker for about 25 years, he quit in 1993. Smoked about 2 ppd.    He worked in a grocery store   No personal or family history of lung disease    Review of Systems: All other systems have been reviewed and are negative except per HPI    Past Medical History:  Past Medical History:   Diagnosis Date    CAD (coronary artery disease)     Hypercholesterolemia     Hypertension     Sciatica     STEMI (ST elevation myocardial

## 2025-06-05 NOTE — PROGRESS NOTES
Cancer [place] [facility]  [address] [facility] [address] [address]  W: [phone] F: [phone]    Hematology/ Oncology Progress Note      Reason for consult:     Francisca Norris is a 84 y.o. male who we have been asked to see by Dr. Jean-Baptiste for new metastatic lung cancer with mets to the liver.    Subjective:     Francisca Norris is a 84 y.o.  male with PMHx significant for CAD, hypercholesterolemia, HTN, sciatica who presented to [facility] for complaints of weakness, back and leg pain. He complained he has had this pain for 2-3 weeks and is concerned about falling. CT imaging of the abdomen was done to rule out PE due to SOB requiring 2L nasal cannula. Imaging showed lung lesion with possible mets to the liver.     He is seen in bed s/p coude placement for urinary retention. He is on 2L nasal cannula and resting comfortably. Patient states he lives alone and performs ADL's. Endorses having unsteady gait over the last month and weight fluctuations. He recalls he \"lost a bunch of weight\" but was \"put on something to make me gain it back\". Denies night sweats, transient fevers. States his three daughters live nearby as well. Discussed with him the findings and need for further workup for staging and tissue identification. He states he is very claustrophobic and concerned for MRI but willing to try as he understands the benefit of staging. Also explained that we will discuss with pulmonary doctor about getting EBUS for lung evaluation and potential biopsy.     Interval History:     6/4/2025  Patient seen at bedside, no family present.  Discussed MRI results and will continue with nuclear med bone scan.  Patient verbalized understanding and agreement, no questions.    6/5/2025  Patient seen at bedside with 2 of his 3 daughters.  Discussed pathology results have returned and positive for small cell lung cancer.  Discussed patient's deterioration, more lethargic

## 2025-06-05 NOTE — PROGRESS NOTES
Hospitalist Progress Note    NAME:   Francisca Norris   : 1940   MRN: [id number]     Date/Time: 2025 7:47 PM  Patient PCP: Rebekah Puri APRN - FREDDY    Estimated discharge date:  Barriers:       Assessment / Plan:        Acute hypoxic respiratory failure POA- on 2 L/min nasal cannula oxygen  R hilar mass with compression of R pulmonary artery POA-   Continue telemetry  Taper oxygen as able  IP oncology consulted-  Inpatient pulmonology consulted-  - , does not appear overloaded     CTA chest: 1. No CT evidence for pulmonary embolus at this time. Extrinsic compression of the right pulmonary arterial system and the right pulmonary veins by a right hilar mass.  2. Right hilar mass or clustered lymph nodes.  3. Mediastinal lymphadenopathy.  4. Incidental upper abdominal findings previously described.    : Patient was not oriented to time place and person, was on 2 L of oxygen the MAP was on the low side.  500 mL of IV fluid given.  Nuclear med bone scan ordered.  Will follow-up on the liver biopsy result.   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  Oncology has a family meeting today family need to choose between starting the chemotherapy while inpatient versus hospice.  NM bone scan with no significant findings     Cirrhosis, transaminitis POA  Due to Liver lesions concerning for metastatic disease POA  -appreciate oncology assistance  Liver biopsy result consistent with small cell carcinoma as stated above     Ultrasound abdomen:Cirrhotic change with scattered hypodensities consistent with metastatic disease.Imaging findings are concerning for neoplastic process/metastatic disease. Otherwise unremarkable examination.     CT abdomen pelvis:Numerous hypodense lesions in the liver, concerning for metastatic disease. 17 mm nonspecific left adrenal nodule. Posterior bladder wall thickening. Suggest cystoscopic evaluation to evaluate for mass.  No nephrolithiasis or

## 2025-06-05 NOTE — PLAN OF CARE
Problem: Occupational Therapy - Adult  Goal: By Discharge: Performs self-care activities at highest level of function for planned discharge setting.  See evaluation for individualized goals.  Description: FUNCTIONAL STATUS PRIOR TO ADMISSION:  pt stated independence w/ ADLs and mod I for mobility w/ RW   , Prior Level of Assist for ADLs: Independent,  ,  ,  ,  ,  , Prior Level of Assist for Homemaking: Independent,  , Prior Level of Assist for Transfers: Independent, Active : Yes     HOME SUPPORT: pt lived alone     Occupational Therapy Goals:  Initiated 6/3/2025  1.  Patient will perform upper body dressing with [place] within 7 day(s).  2.  Patient will perform grooming with Modified [place] within 7 day(s).  3.  Patient will perform lower body dressing with Modified [place] within 7 day(s).  4.  Patient will perform toilet transfers with Modified [place]  within 7 day(s).  5.  Patient will perform all aspects of toileting with [place] within 7 day(s).  6.  Patient will participate in upper extremity therapeutic exercise/activities with Supervision for 7 minutes within 7 day(s).    7.  Patient will utilize energy conservation techniques during functional activities with verbal cues within 7 day(s).   Outcome: Progressing   OCCUPATIONAL THERAPY TREATMENT  Patient: Francisca Norris (84 y.o. male)  Date: 6/5/2025  Primary Diagnosis: Metastatic cancer (HCC) [C79.9]  Acute hypoxic respiratory failure (HCC) [J96.01]       Precautions: Fall Risk, Bed Alarm                Chart, occupational therapy assessment, plan of care, and goals were reviewed.    ASSESSMENT  Patient continues to benefit from skilled OT services and is progressing towards goals. Patient is received resting in bed on 2L, attempting to eat breakfast with difficulty; he is amenable for OOB to chair to finish meal. Patient exhibits improved functional mobility and motor planning today, taking steps to chair with RW, min

## 2025-06-05 NOTE — PROGRESS NOTES
Hospitalist Progress Note    NAME:   Francisca Norris   : 1940   MRN: [id number]     Date/Time: 2025 7:44 PM  Patient PCP: Rebekah Puri APRN - NP    Estimated discharge date:  Barriers:       Assessment / Plan:        Acute hypoxic respiratory failure POA- on 2 L/min nasal cannula oxygen  R hilar mass with compression of R pulmonary artery POA-   Continue telemetry  Taper oxygen as able  IP oncology consulted-following, MRI of brain for staging , ?liver biopsy  Inpatient pulmonology consulted- Will need EBUS-TBNA.   - , does not appear overloaded     CTA chest: 1. No CT evidence for pulmonary embolus at this time. Extrinsic compression of the right pulmonary arterial system and the right pulmonary veins by a right hilar mass.  2. Right hilar mass or clustered lymph nodes.  3. Mediastinal lymphadenopathy.  4. Incidental upper abdominal findings previously described.    : Patient was not oriented to time place and person, was on 2 L of oxygen the MAP was on the low side.  500 mL of IV fluid given.  Nuclear med bone scan ordered.  Will follow-up on the liver biopsy result.      Cirrhosis, transaminitis POA  Due to Liver lesions concerning for metastatic disease POA  -appreciate oncology assistance  -IR consulted for biopsy ?liver     Ultrasound abdomen:Cirrhotic change with scattered hypodensities consistent with metastatic disease.Imaging findings are concerning for neoplastic process/metastatic disease. Otherwise unremarkable examination.     CT abdomen pelvis:Numerous hypodense lesions in the liver, concerning for metastatic disease. 17 mm nonspecific left adrenal nodule. Posterior bladder wall thickening. Suggest cystoscopic evaluation to evaluate for mass.  No nephrolithiasis or hydronephrosis.     Acute kidney injury POA-   -unclear baseline-most recent creatinine was in  and 0.97  -holding home losartan  -No nephrolithiasis or hydronephrosis on CT abd  : Creatinine  increasing from 1.76-1.95  500 mL IVF given     Hypokalemia- resolved  S/p  [facility] at [facility]  - Trend CMP and replete prn     Hypercalcemia  - Likely due to metastatic disease  Cont  IV hydration       Generalized weakness POA  -due to above  -appreciate assistance from PT/OT- following     Hypertension-  Cont holding home meds until further BP trends    Hyperlipidemia-  Cont holding home atorvastatin with LFTs  Sciatica      Medical Decision Making:   I personally reviewed labs: Y BMP, LFTs, CBC, INR, UA  I personally reviewed imaging: Y CTA chest abdomen and pelvis  I personally reviewed EKG: Sinus tachycardia 111 bpm  Toxic drug monitoring: None  Discussed case with: Patient, RN, case management on IDR's         Code Status: Full code  DVT Prophylaxis: SCDs      Subjective:     Chief Complaint / Reason for Physician Visit:  Patient reported no acute distress however patient was not oriented to time place and person.  Patient was confused.  Was not able to get more of the information      Objective:     VITALS:   Last 24hrs VS reviewed since prior progress note. Most recent are:  Patient Vitals for the past 24 hrs:   BP Temp Temp src Pulse Resp SpO2   06/05/25 1606 101/62 98.6 °F (37 °C) -- (!) 105 18 93 %   06/05/25 0835 117/67 97.7 °F (36.5 °C) -- (!) 104 17 92 %   06/05/25 0504 118/71 98.4 °F (36.9 °C) -- (!) 111 18 93 %   06/04/25 2001 111/65 98.8 °F (37.1 °C) Oral 90 15 93 %       No intake or output data in the 24 hours ending 06/05/25 1944       I had a face to face encounter and independently examined this patient on 6/5/2025, as outlined below:  PHYSICAL EXAM:  General: Confused  EENT:  EOMI. Anicteric sclerae.  Resp:  CTA bilaterally, no wheezing or rales.  No accessory muscle use  CV:  Regular  rhythm,  No edema  GI:  Soft, Non distended, Non tender.  +Bowel sounds  Neurologic:  Alert and oriented X 0,        Reviewed most current lab test results and cultures  YES  Reviewed most current

## 2025-06-06 ENCOUNTER — APPOINTMENT (OUTPATIENT)
Facility: HOSPITAL | Age: 85
DRG: 180 | End: 2025-06-06
Attending: STUDENT IN AN ORGANIZED HEALTH CARE EDUCATION/TRAINING PROGRAM
Payer: MEDICARE

## 2025-06-06 ENCOUNTER — ANESTHESIA (OUTPATIENT)
Facility: HOSPITAL | Age: 85
DRG: 180 | End: 2025-06-06
Payer: MEDICARE

## 2025-06-06 ENCOUNTER — ANESTHESIA EVENT (OUTPATIENT)
Facility: HOSPITAL | Age: 85
DRG: 180 | End: 2025-06-06
Payer: MEDICARE

## 2025-06-06 ENCOUNTER — TELEPHONE (OUTPATIENT)
Age: 85
End: 2025-06-06

## 2025-06-06 ENCOUNTER — APPOINTMENT (OUTPATIENT)
Facility: HOSPITAL | Age: 85
DRG: 180 | End: 2025-06-06
Attending: SURGERY
Payer: MEDICARE

## 2025-06-06 LAB
ALBUMIN SERPL-MCNC: 2.4 G/DL (ref 3.5–5)
ALBUMIN/GLOB SERPL: 0.7 (ref 1.1–2.2)
ALP SERPL-CCNC: 296 U/L (ref 45–117)
ALT SERPL-CCNC: 157 U/L (ref 12–78)
ANION GAP SERPL CALC-SCNC: 2 MMOL/L (ref 2–12)
AST SERPL-CCNC: 235 U/L (ref 15–37)
BASOPHILS # BLD: 0 K/UL (ref 0–0.1)
BASOPHILS NFR BLD: 0 % (ref 0–1)
BILIRUB SERPL-MCNC: 1 MG/DL (ref 0.2–1)
BUN SERPL-MCNC: 43 MG/DL (ref 6–20)
BUN/CREAT SERPL: 27 (ref 12–20)
CALCIUM SERPL-MCNC: 12.6 MG/DL (ref 8.5–10.1)
CHLORIDE SERPL-SCNC: 114 MMOL/L (ref 97–108)
CO2 SERPL-SCNC: 27 MMOL/L (ref 21–32)
CREAT SERPL-MCNC: 1.58 MG/DL (ref 0.7–1.3)
DIFFERENTIAL METHOD BLD: ABNORMAL
EOSINOPHIL # BLD: 0.06 K/UL (ref 0–0.4)
EOSINOPHIL NFR BLD: 1 % (ref 0–7)
ERYTHROCYTE [DISTWIDTH] IN BLOOD BY AUTOMATED COUNT: 14.5 % (ref 11.5–14.5)
GLOBULIN SER CALC-MCNC: 3.5 G/DL (ref 2–4)
GLUCOSE SERPL-MCNC: 98 MG/DL (ref 65–100)
HCT VFR BLD AUTO: 31.6 % (ref 36.6–50.3)
HGB BLD-MCNC: 10.2 G/DL (ref 12.1–17)
IMM GRANULOCYTES # BLD AUTO: 0 K/UL (ref 0–0.04)
IMM GRANULOCYTES NFR BLD AUTO: 0 % (ref 0–0.5)
LYMPHOCYTES # BLD: 0.51 K/UL (ref 0.8–3.5)
LYMPHOCYTES NFR BLD: 9 % (ref 12–49)
MCH RBC QN AUTO: 29.8 PG (ref 26–34)
MCHC RBC AUTO-ENTMCNC: 32.3 G/DL (ref 30–36.5)
MCV RBC AUTO: 92.4 FL (ref 80–99)
METAMYELOCYTES NFR BLD MANUAL: 3 %
MONOCYTES # BLD: 0.51 K/UL (ref 0–1)
MONOCYTES NFR BLD: 9 % (ref 5–13)
MYELOCYTES NFR BLD MANUAL: 1 %
NEUTS SEG # BLD: 4.39 K/UL (ref 1.8–8)
NEUTS SEG NFR BLD: 77 % (ref 32–75)
NRBC # BLD: 0.05 K/UL (ref 0–0.01)
NRBC BLD-RTO: 0.9 PER 100 WBC
PLATELET # BLD AUTO: 176 K/UL (ref 150–400)
PMV BLD AUTO: 11.6 FL (ref 8.9–12.9)
POTASSIUM SERPL-SCNC: 3.5 MMOL/L (ref 3.5–5.1)
PROT SERPL-MCNC: 5.9 G/DL (ref 6.4–8.2)
RBC # BLD AUTO: 3.42 M/UL (ref 4.1–5.7)
RBC MORPH BLD: ABNORMAL
SODIUM SERPL-SCNC: 143 MMOL/L (ref 136–145)
WBC # BLD AUTO: 5.7 K/UL (ref 4.1–11.1)

## 2025-06-06 PROCEDURE — 80053 COMPREHEN METABOLIC PANEL: CPT

## 2025-06-06 PROCEDURE — C1788 PORT, INDWELLING, IMP: HCPCS | Performed by: SURGERY

## 2025-06-06 PROCEDURE — 97530 THERAPEUTIC ACTIVITIES: CPT

## 2025-06-06 PROCEDURE — 30233N1 TRANSFUSION OF NONAUTOLOGOUS RED BLOOD CELLS INTO PERIPHERAL VEIN, PERCUTANEOUS APPROACH: ICD-10-PCS | Performed by: SURGERY

## 2025-06-06 PROCEDURE — 0FB13ZX EXCISION OF RIGHT LOBE LIVER, PERCUTANEOUS APPROACH, DIAGNOSTIC: ICD-10-PCS | Performed by: SURGERY

## 2025-06-06 PROCEDURE — 6360000002 HC RX W HCPCS: Performed by: INTERNAL MEDICINE

## 2025-06-06 PROCEDURE — 1100000000 HC RM PRIVATE

## 2025-06-06 PROCEDURE — 36561 INSERT TUNNELED CV CATH: CPT | Performed by: SURGERY

## 2025-06-06 PROCEDURE — 2700000000 HC OXYGEN THERAPY PER DAY

## 2025-06-06 PROCEDURE — 2500000003 HC RX 250 WO HCPCS: Performed by: SURGERY

## 2025-06-06 PROCEDURE — 6360000002 HC RX W HCPCS: Performed by: REGISTERED NURSE

## 2025-06-06 PROCEDURE — 71045 X-RAY EXAM CHEST 1 VIEW: CPT

## 2025-06-06 PROCEDURE — 6360000002 HC RX W HCPCS: Performed by: SURGERY

## 2025-06-06 PROCEDURE — 77001 FLUOROGUIDE FOR VEIN DEVICE: CPT | Performed by: SURGERY

## 2025-06-06 PROCEDURE — 2580000003 HC RX 258: Performed by: INTERNAL MEDICINE

## 2025-06-06 PROCEDURE — 99232 SBSQ HOSP IP/OBS MODERATE 35: CPT | Performed by: NURSE PRACTITIONER

## 2025-06-06 PROCEDURE — 0JH63WZ INSERTION OF TOTALLY IMPLANTABLE VASCULAR ACCESS DEVICE INTO CHEST SUBCUTANEOUS TISSUE AND FASCIA, PERCUTANEOUS APPROACH: ICD-10-PCS | Performed by: SURGERY

## 2025-06-06 PROCEDURE — 94760 N-INVAS EAR/PLS OXIMETRY 1: CPT

## 2025-06-06 PROCEDURE — 3600000012 HC SURGERY LEVEL 2 ADDTL 15MIN: Performed by: SURGERY

## 2025-06-06 PROCEDURE — 0JH60WZ INSERTION OF TOTALLY IMPLANTABLE VASCULAR ACCESS DEVICE INTO CHEST SUBCUTANEOUS TISSUE AND FASCIA, OPEN APPROACH: ICD-10-PCS | Performed by: SURGERY

## 2025-06-06 PROCEDURE — 7100000000 HC PACU RECOVERY - FIRST 15 MIN: Performed by: SURGERY

## 2025-06-06 PROCEDURE — 3700000001 HC ADD 15 MINUTES (ANESTHESIA): Performed by: SURGERY

## 2025-06-06 PROCEDURE — 97535 SELF CARE MNGMENT TRAINING: CPT

## 2025-06-06 PROCEDURE — 99232 SBSQ HOSP IP/OBS MODERATE 35: CPT | Performed by: INTERNAL MEDICINE

## 2025-06-06 PROCEDURE — 36415 COLL VENOUS BLD VENIPUNCTURE: CPT

## 2025-06-06 PROCEDURE — 3600000002 HC SURGERY LEVEL 2 BASE: Performed by: SURGERY

## 2025-06-06 PROCEDURE — 85025 COMPLETE CBC W/AUTO DIFF WBC: CPT

## 2025-06-06 PROCEDURE — 2580000003 HC RX 258: Performed by: STUDENT IN AN ORGANIZED HEALTH CARE EDUCATION/TRAINING PROGRAM

## 2025-06-06 PROCEDURE — 2709999900 HC NON-CHARGEABLE SUPPLY: Performed by: SURGERY

## 2025-06-06 PROCEDURE — 51702 INSERT TEMP BLADDER CATH: CPT

## 2025-06-06 PROCEDURE — 2500000003 HC RX 250 WO HCPCS: Performed by: STUDENT IN AN ORGANIZED HEALTH CARE EDUCATION/TRAINING PROGRAM

## 2025-06-06 PROCEDURE — 76000 FLUOROSCOPY <1 HR PHYS/QHP: CPT

## 2025-06-06 PROCEDURE — 7100000001 HC PACU RECOVERY - ADDTL 15 MIN: Performed by: SURGERY

## 2025-06-06 PROCEDURE — 3700000000 HC ANESTHESIA ATTENDED CARE: Performed by: SURGERY

## 2025-06-06 PROCEDURE — 2500000003 HC RX 250 WO HCPCS

## 2025-06-06 DEVICE — POWERPORT ISP IMPLANTABLE PORT WITH ATTACHABLE 8F CHRONOFLEX OPEN-ENDED SINGLE-LUMEN VENOUS CATHETER INTERMEDIATE KIT (WITH SUTURE PLUGS)
Type: IMPLANTABLE DEVICE | Site: CHEST | Status: FUNCTIONAL
Brand: POWERPORT, CHRONOFLEX

## 2025-06-06 RX ORDER — GLUCAGON 1 MG/ML
1 KIT INJECTION PRN
Status: DISCONTINUED | OUTPATIENT
Start: 2025-06-06 | End: 2025-06-13

## 2025-06-06 RX ORDER — FENTANYL CITRATE 50 UG/ML
25 INJECTION, SOLUTION INTRAMUSCULAR; INTRAVENOUS EVERY 5 MIN PRN
Status: DISCONTINUED | OUTPATIENT
Start: 2025-06-06 | End: 2025-06-09 | Stop reason: ALTCHOICE

## 2025-06-06 RX ORDER — HEPARIN 100 UNIT/ML
500 SYRINGE INTRAVENOUS PRN
Status: DISCONTINUED | OUTPATIENT
Start: 2025-06-06 | End: 2025-06-07 | Stop reason: SDUPTHER

## 2025-06-06 RX ORDER — PROCHLORPERAZINE EDISYLATE 5 MG/ML
5 INJECTION INTRAMUSCULAR; INTRAVENOUS
Status: DISCONTINUED | OUTPATIENT
Start: 2025-06-06 | End: 2025-06-08 | Stop reason: ALTCHOICE

## 2025-06-06 RX ORDER — HYDROCORTISONE SODIUM SUCCINATE 100 MG/2ML
100 INJECTION INTRAMUSCULAR; INTRAVENOUS
Status: DISCONTINUED | OUTPATIENT
Start: 2025-06-06 | End: 2025-06-07 | Stop reason: SDUPTHER

## 2025-06-06 RX ORDER — SODIUM CHLORIDE 9 MG/ML
INJECTION, SOLUTION INTRAVENOUS PRN
Status: ACTIVE | OUTPATIENT
Start: 2025-06-06 | End: 2025-06-09

## 2025-06-06 RX ORDER — ONDANSETRON 2 MG/ML
8 INJECTION INTRAMUSCULAR; INTRAVENOUS
Status: DISCONTINUED | OUTPATIENT
Start: 2025-06-06 | End: 2025-06-07 | Stop reason: SDUPTHER

## 2025-06-06 RX ORDER — EPINEPHRINE 1 MG/ML
0.3 INJECTION, SOLUTION INTRAMUSCULAR; SUBCUTANEOUS PRN
Status: DISCONTINUED | OUTPATIENT
Start: 2025-06-06 | End: 2025-06-07 | Stop reason: SDUPTHER

## 2025-06-06 RX ORDER — DEXTROSE MONOHYDRATE 100 MG/ML
INJECTION, SOLUTION INTRAVENOUS CONTINUOUS PRN
Status: DISCONTINUED | OUTPATIENT
Start: 2025-06-06 | End: 2025-06-13

## 2025-06-06 RX ORDER — IPRATROPIUM BROMIDE AND ALBUTEROL SULFATE 2.5; .5 MG/3ML; MG/3ML
1 SOLUTION RESPIRATORY (INHALATION)
Status: DISCONTINUED | OUTPATIENT
Start: 2025-06-06 | End: 2025-06-13

## 2025-06-06 RX ORDER — PROCHLORPERAZINE EDISYLATE 5 MG/ML
5 INJECTION INTRAMUSCULAR; INTRAVENOUS
Status: DISCONTINUED | OUTPATIENT
Start: 2025-06-06 | End: 2025-06-11

## 2025-06-06 RX ORDER — SODIUM CHLORIDE 9 MG/ML
INJECTION, SOLUTION INTRAVENOUS PRN
Status: DISCONTINUED | OUTPATIENT
Start: 2025-06-06 | End: 2025-06-13

## 2025-06-06 RX ORDER — FENTANYL CITRATE 50 UG/ML
INJECTION, SOLUTION INTRAMUSCULAR; INTRAVENOUS
Status: DISCONTINUED | OUTPATIENT
Start: 2025-06-06 | End: 2025-06-06 | Stop reason: SDUPTHER

## 2025-06-06 RX ORDER — SODIUM CHLORIDE 0.9 % (FLUSH) 0.9 %
5-40 SYRINGE (ML) INJECTION EVERY 12 HOURS SCHEDULED
Status: DISCONTINUED | OUTPATIENT
Start: 2025-06-06 | End: 2025-06-07 | Stop reason: SDUPTHER

## 2025-06-06 RX ORDER — SODIUM CHLORIDE 0.9 % (FLUSH) 0.9 %
5-40 SYRINGE (ML) INJECTION PRN
Status: DISCONTINUED | OUTPATIENT
Start: 2025-06-06 | End: 2025-06-13

## 2025-06-06 RX ORDER — LIDOCAINE HYDROCHLORIDE AND EPINEPHRINE 10; 10 MG/ML; UG/ML
INJECTION, SOLUTION INFILTRATION; PERINEURAL PRN
Status: DISCONTINUED | OUTPATIENT
Start: 2025-06-06 | End: 2025-06-06 | Stop reason: ALTCHOICE

## 2025-06-06 RX ORDER — SODIUM CHLORIDE 9 MG/ML
5-250 INJECTION, SOLUTION INTRAVENOUS PRN
Status: ACTIVE | OUTPATIENT
Start: 2025-06-06 | End: 2025-06-09

## 2025-06-06 RX ORDER — PALONOSETRON 0.05 MG/ML
0.25 INJECTION, SOLUTION INTRAVENOUS ONCE
Status: COMPLETED | OUTPATIENT
Start: 2025-06-06 | End: 2025-06-06

## 2025-06-06 RX ORDER — SODIUM CHLORIDE 9 MG/ML
5-250 INJECTION, SOLUTION INTRAVENOUS PRN
Status: ACTIVE | OUTPATIENT
Start: 2025-06-06 | End: 2025-06-07

## 2025-06-06 RX ORDER — SODIUM CHLORIDE 0.9 % (FLUSH) 0.9 %
5-40 SYRINGE (ML) INJECTION PRN
Status: ACTIVE | OUTPATIENT
Start: 2025-06-06 | End: 2025-06-07

## 2025-06-06 RX ORDER — MEPERIDINE HYDROCHLORIDE 25 MG/ML
12.5 INJECTION INTRAMUSCULAR; INTRAVENOUS; SUBCUTANEOUS PRN
Status: DISCONTINUED | OUTPATIENT
Start: 2025-06-06 | End: 2025-06-07 | Stop reason: SDUPTHER

## 2025-06-06 RX ORDER — ONDANSETRON 2 MG/ML
INJECTION INTRAMUSCULAR; INTRAVENOUS
Status: DISCONTINUED | OUTPATIENT
Start: 2025-06-06 | End: 2025-06-06 | Stop reason: SDUPTHER

## 2025-06-06 RX ORDER — HYDROMORPHONE HYDROCHLORIDE 1 MG/ML
0.5 INJECTION, SOLUTION INTRAMUSCULAR; INTRAVENOUS; SUBCUTANEOUS EVERY 5 MIN PRN
Status: DISCONTINUED | OUTPATIENT
Start: 2025-06-06 | End: 2025-06-10

## 2025-06-06 RX ORDER — ACETAMINOPHEN 325 MG/1
650 TABLET ORAL
Status: DISCONTINUED | OUTPATIENT
Start: 2025-06-06 | End: 2025-06-07 | Stop reason: SDUPTHER

## 2025-06-06 RX ORDER — ALBUTEROL SULFATE 90 UG/1
4 INHALANT RESPIRATORY (INHALATION) PRN
Status: DISCONTINUED | OUTPATIENT
Start: 2025-06-06 | End: 2025-06-06

## 2025-06-06 RX ORDER — NALOXONE HYDROCHLORIDE 0.4 MG/ML
INJECTION, SOLUTION INTRAMUSCULAR; INTRAVENOUS; SUBCUTANEOUS PRN
Status: DISCONTINUED | OUTPATIENT
Start: 2025-06-06 | End: 2025-06-13

## 2025-06-06 RX ORDER — DIPHENHYDRAMINE HYDROCHLORIDE 50 MG/ML
50 INJECTION, SOLUTION INTRAMUSCULAR; INTRAVENOUS
Status: DISCONTINUED | OUTPATIENT
Start: 2025-06-06 | End: 2025-06-07 | Stop reason: SDUPTHER

## 2025-06-06 RX ORDER — ONDANSETRON 2 MG/ML
4 INJECTION INTRAMUSCULAR; INTRAVENOUS
Status: DISCONTINUED | OUTPATIENT
Start: 2025-06-06 | End: 2025-06-09 | Stop reason: ALTCHOICE

## 2025-06-06 RX ORDER — ALBUTEROL SULFATE 0.83 MG/ML
2.5 SOLUTION RESPIRATORY (INHALATION) PRN
Status: ACTIVE | OUTPATIENT
Start: 2025-06-06 | End: 2025-06-09

## 2025-06-06 RX ADMIN — FENTANYL CITRATE 25 MCG: 50 INJECTION, SOLUTION INTRAMUSCULAR; INTRAVENOUS at 12:43

## 2025-06-06 RX ADMIN — ETOPOSIDE 140 MG: 20 INJECTION, SOLUTION INTRAVENOUS at 18:37

## 2025-06-06 RX ADMIN — SODIUM CHLORIDE 25 ML/HR: 0.9 INJECTION, SOLUTION INTRAVENOUS at 17:57

## 2025-06-06 RX ADMIN — WATER 2000 MG: 1 INJECTION INTRAMUSCULAR; INTRAVENOUS; SUBCUTANEOUS at 12:40

## 2025-06-06 RX ADMIN — CARBOPLATIN 280 MG: 10 INJECTION, SOLUTION INTRAVENOUS at 18:00

## 2025-06-06 RX ADMIN — SODIUM CHLORIDE, PRESERVATIVE FREE 10 ML: 5 INJECTION INTRAVENOUS at 21:44

## 2025-06-06 RX ADMIN — FENTANYL CITRATE 25 MCG: 50 INJECTION, SOLUTION INTRAMUSCULAR; INTRAVENOUS at 12:54

## 2025-06-06 RX ADMIN — SODIUM CHLORIDE, PRESERVATIVE FREE 10 ML: 5 INJECTION INTRAVENOUS at 09:41

## 2025-06-06 RX ADMIN — PROPOFOL 75 MCG/KG/MIN: 10 INJECTION, EMULSION INTRAVENOUS at 12:36

## 2025-06-06 RX ADMIN — SODIUM CHLORIDE, SODIUM LACTATE, POTASSIUM CHLORIDE, AND CALCIUM CHLORIDE: .6; .31; .03; .02 INJECTION, SOLUTION INTRAVENOUS at 21:52

## 2025-06-06 RX ADMIN — LIDOCAINE HYDROCHLORIDE 40 MG: 20 INJECTION, SOLUTION EPIDURAL; INFILTRATION; INTRACAUDAL; PERINEURAL at 12:35

## 2025-06-06 RX ADMIN — DEXAMETHASONE SODIUM PHOSPHATE 12 MG: 4 INJECTION, SOLUTION INTRAMUSCULAR; INTRAVENOUS at 17:31

## 2025-06-06 RX ADMIN — PROPOFOL 20 MG: 10 INJECTION, EMULSION INTRAVENOUS at 12:35

## 2025-06-06 RX ADMIN — ONDANSETRON 4 MG: 2 INJECTION INTRAMUSCULAR; INTRAVENOUS at 13:00

## 2025-06-06 RX ADMIN — FOSAPREPITANT 150 MG: 150 INJECTION, POWDER, LYOPHILIZED, FOR SOLUTION INTRAVENOUS at 17:28

## 2025-06-06 RX ADMIN — SODIUM CHLORIDE, PRESERVATIVE FREE 10 ML: 5 INJECTION INTRAVENOUS at 21:45

## 2025-06-06 RX ADMIN — SODIUM CHLORIDE, SODIUM LACTATE, POTASSIUM CHLORIDE, AND CALCIUM CHLORIDE: .6; .31; .03; .02 INJECTION, SOLUTION INTRAVENOUS at 08:46

## 2025-06-06 RX ADMIN — PALONOSETRON 0.25 MG: 0.05 INJECTION, SOLUTION INTRAVENOUS at 17:28

## 2025-06-06 ASSESSMENT — PAIN - FUNCTIONAL ASSESSMENT: PAIN_FUNCTIONAL_ASSESSMENT: 0-10

## 2025-06-06 NOTE — OP NOTE
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    PROCEDURES PERFORMED:  Insertion of a left subclavian vein 8-[employer] PowerPort with supervision interpretation of Radiology.    SURGEON:  Donte Paula MD    ASSISTANT:  Staff.    ANESTHESIA:  MAC.    ESTIMATED BLOOD LOSS:  Minimal.    SPECIMENS REMOVED:  None.    INTRAOPERATIVE FINDINGS:  Infection, no infection at the time of surgery.    Other findings:  Left subclavian vein approach.     COMPLICATIONS:  None.    IMPLANTS:  [employer] PowerPort 8-[place] polyurethane titanium port, lot #[id number].    INDICATIONS:  The patient is a pleasant 84-year-old white male with progressive small-cell lung cancer.  He wishes to start chemotherapy.  Understands the risks and benefits of Port-A-Cath insertion and wished to proceed.    DESCRIPTION OF PROCEDURE:  The patient was taken to the operating room, placed on the operating table in the supine position, and underwent IV sedation.  The arms were tucked and padded at the side and the chest and neck were prepped and draped in usual sterile fashion.  With the patient in Trendelenburg, after appropriate time-out and antibiotics were given, we anesthetized the skin and subcutaneous tissues in the left brachial pectoral region.  An 18-gauge needle was inserted into the subclavian vein on first pass and a wire placed over utilizing Seldinger technique.  A transverse incision was made and a pocket created for

## 2025-06-06 NOTE — PLAN OF CARE
Problem: Occupational Therapy - Adult  Goal: By Discharge: Performs self-care activities at highest level of function for planned discharge setting.  See evaluation for individualized goals.  Description: FUNCTIONAL STATUS PRIOR TO ADMISSION:  pt stated independence w/ ADLs and mod I for mobility w/ RW   , Prior Level of Assist for ADLs: Independent,  ,  ,  ,  ,  , Prior Level of Assist for Homemaking: Independent,  , Prior Level of Assist for Transfers: Independent, Active : Yes     HOME SUPPORT: pt lived alone     Occupational Therapy Goals:  Initiated 6/3/2025  1.  Patient will perform upper body dressing with [place] within 7 day(s).  2.  Patient will perform grooming with Modified [place] within 7 day(s).  3.  Patient will perform lower body dressing with Modified [place] within 7 day(s).  4.  Patient will perform toilet transfers with Modified [place]  within 7 day(s).  5.  Patient will perform all aspects of toileting with [place] within 7 day(s).  6.  Patient will participate in upper extremity therapeutic exercise/activities with Supervision for 7 minutes within 7 day(s).    7.  Patient will utilize energy conservation techniques during functional activities with verbal cues within 7 day(s).   Outcome: Progressing   OCCUPATIONAL THERAPY TREATMENT  Patient: Francisca Norris (84 y.o. male)  Date: 6/6/2025  Primary Diagnosis: Metastatic cancer (HCC) [C79.9]  Acute hypoxic respiratory failure (HCC) [J96.01]  Procedure(s) (LRB):  LEFT SUBCLAVIAN VEIN PORT INSERTION (Left) * Day of Surgery *   Precautions: Fall Risk, Bed Alarm                Chart, occupational therapy assessment, plan of care, and goals were reviewed.    ASSESSMENT  Patient continues to benefit from skilled OT services and is slowly progressing towards goals. Patient is received on 3L O2 in fully supine position, exhibiting tachypnea and shallow breathing. With encouragement / education, he is amenable to session

## 2025-06-06 NOTE — PROGRESS NOTES
End of Shift Note    Bedside shift change report given to MARIA TERESA Bran (oncoming nurse) by BLAS PLASCENCIA RN (offgoing nurse).  Report included the following information SBAR, Kardex, MAR, and Recent Results    Shift worked:  2448-8419     Shift summary and any significant changes:     Patient received medication per MAR. Education provided. Bone Scan completed. Patient is x 1 with a walker. Orozco care completed. Caring rounds completed.     Concerns for physician to address:  None     Zone phone for oncoming shift:   [phone]       Activity:  Level of Assistance: Maximum assist, patient does 25-49%  Number times ambulated in hallways past shift: 0  Number of times OOB to chair past shift: 0    Cardiac:   Cardiac Monitoring: Yes      Cardiac Rhythm: Sinus rhythm    Access:  Current line(s): PIV     Genitourinary:   Urinary Status: Voiding, Orozco    Respiratory:   O2 Device: Nasal cannula  Chronic home O2 use?: NO  Incentive spirometer at bedside: NO    GI:  Last BM (including prior to admit): 06/05/25  Current diet:  ADULT DIET; Regular  ADULT ORAL NUTRITION SUPPLEMENT; Breakfast, Lunch, Dinner; Standard High Calorie/High Protein Oral Supplement  Diet NPO  Passing flatus: YES    Pain Management:   Patient states pain is manageable on current regimen: YES    Skin:  Brant Scale Score: 16  Interventions: Wound Offloading (Prevention Methods): Pillows, Turning    Patient Safety:  Fall Risk: Nursing Judgement-Fall Risk High(Add Comments): Yes  Fall Risk Interventions  Nursing Judgement-Fall Risk High(Add Comments): Yes  Toilet Every 2 Hours-In Advance of Need: Yes  Hourly Visual Checks: Eyes closed, In bed  Fall Visual Posted: Armband, Fall sign posted, Socks  Room Door Open: Yes  Alarm On: Other (Comment) (Bed alarm not working)  Patient Moved Closer to Nursing Station: No    Active Consults:   IP CONSULT TO PALLIATIVE CARE  IP CONSULT TO ONCOLOGY  IP CONSULT TO INTERVENTIONAL RADIOLOGY  IP CONSULT TO PULMONOLOGY  IP  CONSULT TO UROLOGY  IP CONSULT TO GENERAL SURGERY    Length of Stay:  Expected LOS: 6  Actual LOS: 3    BLAS PLASCENCIA, RN

## 2025-06-06 NOTE — PROGRESS NOTES
Pulmonary Progress Note    C/c: acute hypoxic respiratory failure, R hilar mass with compression of R pulmonary artery    Assessment   Acute hypoxic respiratory failure  R hilar mass with compression of R pulmonary artery  Mediastinal lymphadenopathy  Acute kidney injury  Cirrhosis  Liver lesions      Plan  Patient is requiring 2L supplemental oxygen. Titrate to keep O2 sats above 90%  CTA of chest showed a right hilar mass that is compressing the right pulm artery and mediastinal lymphadenopathy  Liver biopsy performed yesterday, report showed small cell carcinoma, positive for malignancy.  Low dose oral morphine may help with symptom management.  Consider palliative care evaluation for symptom control   He is high risk for vascular complications.   Oncology is recommending bronchoscopy/EBUS for additional tissue and to rule out to concomitant malignancies. We will schedule this outpatient for 6/10.  If patient remains in the hospital [facility].          Radha Cline, ZACHARIAHP-C  [facility]   [phone] [facility]  [phone] ([phone]) Office  [phone] Fax        Hospital interval:  6/4 -patient lying comfortably in bed.  No events overnight.  Discussed plan with patient regarding bronchoscopy/EBUS.   6/5 -patient sitting comfortably in bed on 2 L supplemental oxygen, no distress.  6/6  -patient sitting up in bed with family at bedside, just got back from port placement.        HPI: Francisca Norris is a 84 y.o.  male with PMHx significant for CAD, hypercholesterolemia, HTN, sciatica who presented to [facility] for complaints of weakness, back and leg pain. He complained he has had this pain for 2-3 weeks and is concerned about falling. CT imaging of the abdomen was done to rule out PE due to SOB requiring 2L nasal cannula. Imaging showed lung lesion with possible mets to the liver.     The patient was a smoker for about 25 years, he quit in 1993. Smoked about 2 ppd.    He worked in a

## 2025-06-06 NOTE — BRIEF OP NOTE
Brief Postoperative Note      Patient: Francisca Norris  YOB: 1940  MRN: [id number]    Date of Procedure: 6/6/2025    Pre-Op Diagnosis Codes:      * Malignant neoplasm of lung, unspecified laterality, unspecified part of lung (HCC) [C34.90]    Post-Op Diagnosis: Post-Op Diagnosis Codes:     * Malignant neoplasm of lung, unspecified laterality, unspecified part of lung (HCC) [C34.90]       Procedure(s):  LEFT SUBCLAVIAN VEIN PORT INSERTION    Surgeon(s):  Donte Rios MD    Assistant:  * No surgical staff found *    Anesthesia: Monitor Anesthesia Care    Estimated Blood Loss (mL): Minimal    Complications: None    Specimens:   * No specimens in log *    Implants:  Implant Name Type Inv. Item Serial No.  Lot No. LRB No. Used Action   PORT INFUS 8FR POLYUR ATTCH CHRONOFLEX CATH TI CANDIS FILL SUT - SNA  PORT INFUS 8FR POLYUR ATTCH CHRONOFLEX CATH TI CANDIS FILL SUT NA [employer] [id number] Left 1 Implanted         Drains:   Urinary Catheter 06/03/25 Coude (Active)   Catheter Indications Urinary retention (acute or chronic), continuous bladder irrigation or bladder outlet obstruction 06/05/25 2027   Site Assessment No urethral drainage 06/05/25 2027   Urine Color Yellow 06/05/25 2027   Urine Appearance Clear 06/05/25 2027   Urine Odor Other (Comment) 06/03/25 1100   Collection Container Standard 06/05/25 2027   Securement Method Securing device (Describe) 06/05/25 2027   Catheter Care  Perineal wipes 06/05/25 2027   Catheter Best Practices  Drainage tube clipped to bed;Catheter secured to thigh;Tamper seal intact;Bag below bladder;Bag not on floor;Lack of dependent loop in tubing 06/05/25 2027   Status Draining 06/05/25 2027   Output (mL) 225 mL 06/04/25 0611       Findings:  Infection Present At Time Of Surgery (PATOS) (choose all levels that have infection present):  No infection present  Other Findings: left subclavian vein    Electronically signed by Donte  [id number]: || ||00\01||False;

## 2025-06-06 NOTE — PROGRESS NOTES
Palliative Medicine  Patient Name: Francisca Norris  YOB: 1940  MRN: [id number]  Age: 84 y.o.  Gender: male    Date of Initial Consult: 6/3/2025  Date of Service: 6/6/2025  Time: 12:26 PM  Provider: Rylie Dugan MD  Hospital Day: 5  Admit Date: 6/2/2025  Referring Provider: Hospitalist      Reasons for Consultation:  Goals of Care    HISTORY OF PRESENT ILLNESS (HPI):   Francisca Norris is a 84 y.o. male with a past medical history of urinary retention, who was admitted on 6/2/2025 from home with a diagnosis of acute hypoxic failure in the setting of new hilar mass and liver mass concerning for metastatic malignancy.     Psychosocial: Patient lives at home independently, able to drive, he worked multiple jobs but most recently worked in a [employer], he has several children, oldest daughter Genoveva is the one who is most involved.  His wife passed away in 2014        ASSESSMENT AND PLAN:   6/6-noted decision of patient and family to proceed with inpatient chemotherapy.  Attempted to reach daughter Genoveva, no answer.  Discussed with oncology NP Ba-family meeting from yesterday noted.  Plan is to receive chemotherapy later today after port placement and then rehab stay.  I will try and reach daughter again to discuss CODE STATUS at least.  Would recommend DNR but given patient is proceeding with chemotherapy, family may not want to have DNR just yet.  Patient will benefit from outpatient palliative support.  Will discuss with her if I can speak with her about establishing care with our outpatient team.  But I am worried that family may not be able to transport him to [facility], virtual visits may be difficult with daughters not being at bedside, etc.  ------------------------------  Thin gentleman, spent time getting to know him.  He is definitely shocked to hear about the possibility of cancer.  Many questions about tests that need to happen, etc.  Wants to know what this means for him.

## 2025-06-06 NOTE — PROGRESS NOTES
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 No CT evidence for pulmonary embolus at this time. Extrinsic compression of the right pulmonary arterial system and the right pulmonary veins by a right hilar mass.  2. Right hilar mass or clustered lymph nodes.  3. Mediastinal lymphadenopathy.  4. Incidental upper abdominal findings previously described.     : Patient was not oriented to time place and person, was on 2 L of oxygen the MAP was on the low side.  500 mL of IV fluid given.  Nuclear med bone scan ordered.  Will follow-up on the liver biopsy result.   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  Oncology has a family meeting today family need to choose between starting the chemotherapy while inpatient versus hospice.  NM bone scan with no significant findings  On : S/p left subclavian vein port insertion by general surgery.  As per oncology-patient would like to pursue 1 cycle of carbo etoposide.  Plan to proceed on  after port placement.  Patient will continue to stay till  for final dose of etoposide.  Discussed with CM plan to transition to SNF on  after completion of chemotherapy.  Patient is considering transition to hospice based on how he tolerates cycle 1.     Cirrhosis, transaminitis POA  Due to Liver lesions concerning for metastatic disease POA  -appreciate oncology assistance  Liver biopsy result consistent with small cell carcinoma as stated above     Ultrasound abdomen:Cirrhotic change with

## 2025-06-06 NOTE — ANESTHESIA POSTPROCEDURE EVALUATION
Department of Anesthesiology  Postprocedure Note    Patient: Francisca Norris  MRN: [id number]  YOB: 1940  Date of evaluation: 6/6/2025    Procedure Summary       Date: 06/06/25 Room / Location: [facility] / [facility]    Anesthesia Start: 1225 Anesthesia Stop: 1321    Procedure: LEFT SUBCLAVIAN VEIN PORT INSERTION (Left: Chest) Diagnosis:       Malignant neoplasm of lung, unspecified laterality, unspecified part of lung (HCC)      (Malignant neoplasm of lung, unspecified laterality, unspecified part of lung (HCC) [C34.90])    Providers: Donte Rios MD Responsible Provider: Tomás Arellano MD    Anesthesia Type: MAC ASA Status: 4            Anesthesia Type: MAC    Merry Phase I: Merry Score: 8    Merry Phase II:      Anesthesia Post Evaluation    Patient location during evaluation: PACU  Patient participation: complete - patient participated  Level of consciousness: sleepy but conscious and responsive to verbal stimuli  Airway patency: patent  Nausea & Vomiting: no vomiting and no nausea  Cardiovascular status: blood pressure returned to baseline and hemodynamically stable  Respiratory status: acceptable  Hydration status: stable  Pain management: adequate    No notable events documented.

## 2025-06-06 NOTE — DISCHARGE INSTRUCTIONS
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   Keep your medication in the bottles provided by the pharmacist and keep a list of the medication names, dosages, and times to be taken in your wallet.   Do not take other medications without consulting your doctor.     Pain Management: per above medications    What to do at Home    Recommended diet:  Comfort diet    Recommended activity: activity as tolerated    If you have questions regarding the hospital related prescriptions or hospital related issues please call at .    If you experience any of the following symptoms then please call your primary care physician or return to the emergency room if you cannot get hold of your doctor:  Fever, chills, nausea, vomiting, diarrhea, change in mentation, falling, bleeding, shortness of breath,     Follow Up:  [facility] [place] as arranged      Information obtained by :  I understand that if any problems occur once I am at home I am to contact my physician.    I  understand and acknowledge receipt of the instructions indicated above.                                                                                                                                           Physician's or R.N.'s Signature                                                                  Date/Time                                                                                                                                              Patient or Representative Signature                                                          Date/Time

## 2025-06-06 NOTE — PROGRESS NOTES
Surgery    Spoke with patient who states that they have decided to proceed with treatment      Plan port placement later today    Donte Rios MD FACS    Addendum:    Spoke with Genoveva by phone and confirmed they did discuss all options with patient and are supporting his choice and ability to make his own decision regarding port placement and treatment. Will proceed as above.     BETO Akhtar NP   6/6/2025  9:11 AM

## 2025-06-06 NOTE — PLAN OF CARE
Problem: Physical Therapy - Adult  Goal: By Discharge: Performs mobility at highest level of function for planned discharge setting.  See evaluation for individualized goals.  Description: FUNCTIONAL STATUS PRIOR TO ADMISSION: Patient was modified independent using a rolling walker for functional mobility, though initially reported does not use DME.     HOME SUPPORT PRIOR TO ADMISSION: The patient lived alone.    Physical Therapy Goals  Initiated 6/3/2025  1.  Patient will move from supine to sit and sit to supine, scoot up and down, and roll side to side in bed with modified independence within 7 day(s).    2.  Patient will perform sit to stand with modified independence within 7 day(s).  3.  Patient will transfer from bed to chair and chair to bed with modified independence using the least restrictive device within 7 day(s).  4.  Patient will ambulate with modified independence for 150 feet with the least restrictive device within 7 day(s).   5.  Patient will ascend/descend 3 stairs with 1 handrail(s) with modified independence within 7 day(s).   Outcome: Progressing   PHYSICAL THERAPY TREATMENT    Patient: Francisca Norris (84 y.o. male)  Date: 6/6/2025  Diagnosis: Metastatic cancer (HCC) [C79.9]  Acute hypoxic respiratory failure (HCC) [J96.01] Acute hypoxic respiratory failure (HCC)  Procedure(s) (LRB):  LEFT SUBCLAVIAN VEIN PORT INSERTION (Left) * Day of Surgery *  Precautions: Restrictions/Precautions  Restrictions/Precautions: Fall Risk, Bed Alarm            ASSESSMENT:  Patient continues to benefit from skilled PT services and is slowly progressing towards goals. Pt received in bed. He was initially awake but verbalizing very little but with encouragement from PT/OT/wife was agreeable to activity. Pt was overall min to CGA with bed mobility and transfers but needed extra time for processing and occ re-cues/guiding. He was able to stand and transfer to the chair with CGA/min A and did show some visible SOB

## 2025-06-06 NOTE — ANESTHESIA PRE PROCEDURE
Department of Anesthesiology  Preprocedure Note       Name:  Francisca Norris   Age:  84 y.o.  :  1940                                          MRN:  [id number]         Date:  2025      Surgeon: Surgeon(s):  Donte Rios MD    Procedure: Procedure(s):  PORT INSERTION    Medications prior to admission:   Prior to Admission medications    Medication Sig Start Date End Date Taking? Authorizing Provider   aspirin (ASPIRIN ADULT LOW STRENGTH) 81 MG EC tablet Take 1 tablet by mouth daily 5/15/25   Mark Cueto MD   metoprolol succinate (TOPROL XL) 25 MG extended release tablet Take 1 tablet by mouth daily 3/19/25   Mark Cueto MD   nitroGLYCERIN (NITROSTAT) 0.4 MG SL tablet Place 1 tablet under the tongue every 5 minutes as needed for Chest pain If pain has not subsided after 15 minutes CALL 911 25   Mark Cueto MD   losartan (COZAAR) 25 MG tablet Take 1 tablet by mouth daily 25   Mark Cueto MD   hydroCHLOROthiazide (HYDRODIURIL) 25 MG tablet Take 1 tablet by mouth daily    Provider, MD Alyx   atorvastatin (LIPITOR) 40 MG tablet Take 1 tablet by mouth nightly 21   Automatic Reconciliation, Ar       Current medications:    Current Facility-Administered Medications   Medication Dose Route Frequency Provider Last Rate Last Admin    ceFAZolin (ANCEF) 2,000 mg in sterile water 20 mL IV syringe  2,000 mg IntraVENous On Call to OR Donte Rios MD        lactated ringers infusion   IntraVENous Continuous AslGuerita coffman  mL/hr at 25 0846 New Bag at 25 0846    sodium chloride flush 0.9 % injection 5-40 mL  5-40 mL IntraVENous 2 times per day Megan Hancock APRN - NP   10 mL at 25 0941    sodium chloride flush 0.9 % injection 5-40 mL  5-40 mL IntraVENous PRN Megan Hancock APRN - NP        0.9 % sodium chloride infusion   IntraVENous PRN Megan Hancock APRN - NP        ondansetron (ZOFRAN-ODT) disintegrating tablet 4 mg  4 mg

## 2025-06-06 NOTE — PROGRESS NOTES
Can[facility] [place] [facility]  [address] [facility] [address] [address]  W: [phone] F: [phone]    Hematology/ Oncology Progress Note      Reason for consult:     Francisca Norris is a 84 y.o. male who we have been asked to see by Dr. Jean-Baptiste for new metastatic lung cancer with mets to the liver.    Subjective:     Francisca Norris is a 84 y.o.  male with PMHx significant for CAD, hypercholesterolemia, HTN, sciatica who presented to [facility] for complaints of weakness, back and leg pain. He complained he has had this pain for 2-3 weeks and is concerned about falling. CT imaging of the abdomen was done to rule out PE due to SOB requiring 2L nasal cannula. Imaging showed lung lesion with possible mets to the liver.     He is seen in bed s/p coude placement for urinary retention. He is on 2L nasal cannula and resting comfortably. Patient states he lives alone and performs ADL's. Endorses having unsteady gait over the last month and weight fluctuations. He recalls he \"lost a bunch of weight\" but was \"put on something to make me gain it back\". Denies night sweats, transient fevers. States his three daughters live nearby as well. Discussed with him the findings and need for further workup for staging and tissue identification. He states he is very claustrophobic and concerned for MRI but willing to try as he understands the benefit of staging. Also explained that we will discuss with pulmonary doctor about getting EBUS for lung evaluation and potential biopsy.     Interval History:     6/4/2025  Patient seen at bedside, no family present.  Discussed MRI results and will continue with nuclear med bone scan.  Patient verbalized understanding and agreement, no questions.    6/5/2025  Patient seen at bedside with 2 of his 3 daughters.  Discussed pathology results have returned and positive for small cell lung cancer.  Discussed patient's deterioration, more lethargic  chloride flush 0.9 % injection 5-40 mL  5-40 mL IntraVENous 2 times per day Tomás Arellano MD        sodium chloride flush 0.9 % injection 5-40 mL  5-40 mL IntraVENous PRN Tomás Arellano MD        0.9 % sodium chloride infusion   IntraVENous PRN Tomás Arellano MD        fentaNYL (SUBLIMAZE) injection 25 mcg  25 mcg IntraVENous Q5 Min PRN Tomás Arellano MD        HYDROmorphone HCl PF (DILAUDID) injection 0.5 mg  0.5 mg IntraVENous Q5 Min PRN Tomás Arellano MD        ondansetron (ZOFRAN) injection 4 mg  4 mg IntraVENous Once PRN Tomás Arellano MD        prochlorperazine (COMPAZINE) injection 5 mg  5 mg IntraVENous Once PRN Tomás Arellano MD        ipratropium 0.5 mg-albuterol 2.5 mg (DUONEB) nebulizer solution 1 Dose  1 Dose Inhalation Once PRN Tomás Arellano MD        glucose chewable tablet 16 g  4 tablet Oral PRN Tomás Arellano MD        dextrose bolus 10% 125 mL  125 mL IntraVENous PRN oTmás Arellano MD        Or    dextrose bolus 10% 250 mL  250 mL IntraVENous PRN Tomás Arellano MD        glucagon injection 1 mg  1 mg SubCUTAneous PRN Tomás Arellano MD        dextrose 10 % infusion   IntraVENous Continuous PRN Tomás Arellano MD        lactated ringers infusion   IntraVENous Continuous Guerita Pool  mL/hr at 06/06/25 1321 Rate Change at 06/06/25 1321    sodium chloride flush 0.9 % injection 5-40 mL  5-40 mL IntraVENous 2 times per day Megan Hancock APRN - NP   10 mL at 06/06/25 0941    sodium chloride flush 0.9 % injection 5-40 mL  5-40 mL IntraVENous PRN Megan Hancock APRN - NP        0.9 % sodium chloride infusion   IntraVENous PRN Megan Hancock APRN - NP        ondansetron (ZOFRAN-ODT) disintegrating tablet 4 mg  4 mg Oral Q8H PRN Megan Hancock APRN - NP        Or    ondansetron (ZOFRAN) injection 4 mg  4 mg IntraVENous Q6H PRN Megan Hancock APRN - NP        polyethylene glycol

## 2025-06-07 PROBLEM — C78.7 SMALL CELL CARCINOMA OF LUNG METASTATIC TO LIVER (HCC): Status: ACTIVE | Noted: 2025-06-07

## 2025-06-07 PROBLEM — J96.21 ACUTE ON CHRONIC RESPIRATORY FAILURE WITH HYPOXIA AND HYPERCAPNIA (HCC): Status: ACTIVE | Noted: 2025-06-07

## 2025-06-07 PROBLEM — C34.90 SMALL CELL CARCINOMA OF LUNG METASTATIC TO LIVER (HCC): Status: ACTIVE | Noted: 2025-06-07

## 2025-06-07 PROBLEM — Z51.11 ENCOUNTER FOR ANTINEOPLASTIC CHEMOTHERAPY: Status: ACTIVE | Noted: 2025-06-07

## 2025-06-07 PROBLEM — J96.22 ACUTE ON CHRONIC RESPIRATORY FAILURE WITH HYPOXIA AND HYPERCAPNIA (HCC): Status: ACTIVE | Noted: 2025-06-07

## 2025-06-07 LAB
ALBUMIN SERPL-MCNC: 2.2 G/DL (ref 3.5–5)
ALBUMIN/GLOB SERPL: 0.6 (ref 1.1–2.2)
ALP SERPL-CCNC: 296 U/L (ref 45–117)
ALT SERPL-CCNC: 141 U/L (ref 12–78)
ANION GAP SERPL CALC-SCNC: 1 MMOL/L (ref 2–12)
AST SERPL-CCNC: 223 U/L (ref 15–37)
BASOPHILS # BLD: 0 K/UL (ref 0–0.1)
BASOPHILS NFR BLD: 0 % (ref 0–1)
BILIRUB SERPL-MCNC: 0.8 MG/DL (ref 0.2–1)
BUN SERPL-MCNC: 46 MG/DL (ref 6–20)
BUN/CREAT SERPL: 30 (ref 12–20)
CALCIUM SERPL-MCNC: 12 MG/DL (ref 8.5–10.1)
CHLORIDE SERPL-SCNC: 116 MMOL/L (ref 97–108)
CO2 SERPL-SCNC: 23 MMOL/L (ref 21–32)
CREAT SERPL-MCNC: 1.52 MG/DL (ref 0.7–1.3)
DIFFERENTIAL METHOD BLD: ABNORMAL
EOSINOPHIL # BLD: 0 K/UL (ref 0–0.4)
EOSINOPHIL NFR BLD: 0 % (ref 0–7)
ERYTHROCYTE [DISTWIDTH] IN BLOOD BY AUTOMATED COUNT: 14.6 % (ref 11.5–14.5)
GLOBULIN SER CALC-MCNC: 3.6 G/DL (ref 2–4)
GLUCOSE SERPL-MCNC: 135 MG/DL (ref 65–100)
HCT VFR BLD AUTO: 30.7 % (ref 36.6–50.3)
HGB BLD-MCNC: 9.8 G/DL (ref 12.1–17)
IMM GRANULOCYTES # BLD AUTO: 0 K/UL (ref 0–0.04)
IMM GRANULOCYTES NFR BLD AUTO: 0 % (ref 0–0.5)
LYMPHOCYTES # BLD: 0.44 K/UL (ref 0.8–3.5)
LYMPHOCYTES NFR BLD: 8 % (ref 12–49)
MCH RBC QN AUTO: 29.5 PG (ref 26–34)
MCHC RBC AUTO-ENTMCNC: 31.9 G/DL (ref 30–36.5)
MCV RBC AUTO: 92.5 FL (ref 80–99)
MONOCYTES # BLD: 0.22 K/UL (ref 0–1)
MONOCYTES NFR BLD: 4 % (ref 5–13)
NEUTS BAND NFR BLD MANUAL: 17 %
NEUTS SEG # BLD: 4.84 K/UL (ref 1.8–8)
NEUTS SEG NFR BLD: 71 % (ref 32–75)
NRBC # BLD: 0.03 K/UL (ref 0–0.01)
NRBC BLD-RTO: 0.5 PER 100 WBC
PLATELET # BLD AUTO: 165 K/UL (ref 150–400)
PMV BLD AUTO: 11.5 FL (ref 8.9–12.9)
POTASSIUM SERPL-SCNC: 4.2 MMOL/L (ref 3.5–5.1)
PROT SERPL-MCNC: 5.8 G/DL (ref 6.4–8.2)
RBC # BLD AUTO: 3.32 M/UL (ref 4.1–5.7)
RBC MORPH BLD: ABNORMAL
SODIUM SERPL-SCNC: 140 MMOL/L (ref 136–145)
WBC # BLD AUTO: 5.5 K/UL (ref 4.1–11.1)

## 2025-06-07 PROCEDURE — 6360000002 HC RX W HCPCS: Performed by: INTERNAL MEDICINE

## 2025-06-07 PROCEDURE — 2500000003 HC RX 250 WO HCPCS

## 2025-06-07 PROCEDURE — 2580000003 HC RX 258: Performed by: INTERNAL MEDICINE

## 2025-06-07 PROCEDURE — 94760 N-INVAS EAR/PLS OXIMETRY 1: CPT

## 2025-06-07 PROCEDURE — 6370000000 HC RX 637 (ALT 250 FOR IP): Performed by: NURSE PRACTITIONER

## 2025-06-07 PROCEDURE — 6370000000 HC RX 637 (ALT 250 FOR IP)

## 2025-06-07 PROCEDURE — 36415 COLL VENOUS BLD VENIPUNCTURE: CPT

## 2025-06-07 PROCEDURE — 80053 COMPREHEN METABOLIC PANEL: CPT

## 2025-06-07 PROCEDURE — 2700000000 HC OXYGEN THERAPY PER DAY

## 2025-06-07 PROCEDURE — 85025 COMPLETE CBC W/AUTO DIFF WBC: CPT

## 2025-06-07 PROCEDURE — 2500000003 HC RX 250 WO HCPCS: Performed by: STUDENT IN AN ORGANIZED HEALTH CARE EDUCATION/TRAINING PROGRAM

## 2025-06-07 PROCEDURE — 51702 INSERT TEMP BLADDER CATH: CPT

## 2025-06-07 PROCEDURE — 2580000003 HC RX 258: Performed by: STUDENT IN AN ORGANIZED HEALTH CARE EDUCATION/TRAINING PROGRAM

## 2025-06-07 PROCEDURE — 1100000000 HC RM PRIVATE

## 2025-06-07 RX ORDER — ALBUTEROL SULFATE 90 UG/1
4 INHALANT RESPIRATORY (INHALATION) PRN
Status: ACTIVE | OUTPATIENT
Start: 2025-06-08 | End: 2025-06-09

## 2025-06-07 RX ORDER — ONDANSETRON 2 MG/ML
8 INJECTION INTRAMUSCULAR; INTRAVENOUS
Status: DISCONTINUED | OUTPATIENT
Start: 2025-06-08 | End: 2025-06-09 | Stop reason: ALTCHOICE

## 2025-06-07 RX ORDER — SODIUM CHLORIDE 9 MG/ML
5-250 INJECTION, SOLUTION INTRAVENOUS PRN
Status: ACTIVE | OUTPATIENT
Start: 2025-06-07 | End: 2025-06-08

## 2025-06-07 RX ORDER — DEXAMETHASONE SODIUM PHOSPHATE 10 MG/ML
8 INJECTION, SOLUTION INTRAMUSCULAR; INTRAVENOUS ONCE
Status: COMPLETED | OUTPATIENT
Start: 2025-06-08 | End: 2025-06-08

## 2025-06-07 RX ORDER — SODIUM CHLORIDE 9 MG/ML
INJECTION, SOLUTION INTRAVENOUS CONTINUOUS
Status: ACTIVE | OUTPATIENT
Start: 2025-06-07 | End: 2025-06-08

## 2025-06-07 RX ORDER — SODIUM CHLORIDE 0.9 % (FLUSH) 0.9 %
5-40 SYRINGE (ML) INJECTION PRN
Status: DISCONTINUED | OUTPATIENT
Start: 2025-06-07 | End: 2025-06-07 | Stop reason: SDUPTHER

## 2025-06-07 RX ORDER — HYDROCORTISONE SODIUM SUCCINATE 100 MG/2ML
100 INJECTION INTRAMUSCULAR; INTRAVENOUS
Status: DISCONTINUED | OUTPATIENT
Start: 2025-06-07 | End: 2025-06-08 | Stop reason: ALTCHOICE

## 2025-06-07 RX ORDER — ACETAMINOPHEN 325 MG/1
650 TABLET ORAL
Status: DISCONTINUED | OUTPATIENT
Start: 2025-06-08 | End: 2025-06-11

## 2025-06-07 RX ORDER — EPINEPHRINE 1 MG/ML
0.3 INJECTION, SOLUTION INTRAMUSCULAR; SUBCUTANEOUS PRN
Status: DISCONTINUED | OUTPATIENT
Start: 2025-06-08 | End: 2025-06-09 | Stop reason: ALTCHOICE

## 2025-06-07 RX ORDER — DIPHENHYDRAMINE HYDROCHLORIDE 50 MG/ML
50 INJECTION, SOLUTION INTRAMUSCULAR; INTRAVENOUS
Status: DISCONTINUED | OUTPATIENT
Start: 2025-06-07 | End: 2025-06-08 | Stop reason: ALTCHOICE

## 2025-06-07 RX ORDER — HEPARIN 100 UNIT/ML
500 SYRINGE INTRAVENOUS PRN
Status: ACTIVE | OUTPATIENT
Start: 2025-06-08 | End: 2025-06-09

## 2025-06-07 RX ORDER — HYDROCORTISONE SODIUM SUCCINATE 100 MG/2ML
100 INJECTION INTRAMUSCULAR; INTRAVENOUS
Status: DISCONTINUED | OUTPATIENT
Start: 2025-06-08 | End: 2025-06-08 | Stop reason: ALTCHOICE

## 2025-06-07 RX ORDER — DEXAMETHASONE SODIUM PHOSPHATE 10 MG/ML
8 INJECTION, SOLUTION INTRAMUSCULAR; INTRAVENOUS ONCE
Status: COMPLETED | OUTPATIENT
Start: 2025-06-07 | End: 2025-06-07

## 2025-06-07 RX ORDER — DEXAMETHASONE SODIUM PHOSPHATE 10 MG/ML
8 INJECTION, SOLUTION INTRAMUSCULAR; INTRAVENOUS ONCE
Status: DISCONTINUED | OUTPATIENT
Start: 2025-06-07 | End: 2025-06-07

## 2025-06-07 RX ORDER — DIPHENHYDRAMINE HYDROCHLORIDE 50 MG/ML
50 INJECTION, SOLUTION INTRAMUSCULAR; INTRAVENOUS
Status: DISCONTINUED | OUTPATIENT
Start: 2025-06-08 | End: 2025-06-08 | Stop reason: ALTCHOICE

## 2025-06-07 RX ORDER — ALBUTEROL SULFATE 90 UG/1
4 INHALANT RESPIRATORY (INHALATION) PRN
Status: ACTIVE | OUTPATIENT
Start: 2025-06-07 | End: 2025-06-08

## 2025-06-07 RX ORDER — DIPHENHYDRAMINE HYDROCHLORIDE 50 MG/ML
50 INJECTION, SOLUTION INTRAMUSCULAR; INTRAVENOUS
Status: DISCONTINUED | OUTPATIENT
Start: 2025-06-08 | End: 2025-06-11

## 2025-06-07 RX ORDER — MEPERIDINE HYDROCHLORIDE 25 MG/ML
12.5 INJECTION INTRAMUSCULAR; INTRAVENOUS; SUBCUTANEOUS PRN
Status: DISCONTINUED | OUTPATIENT
Start: 2025-06-08 | End: 2025-06-13

## 2025-06-07 RX ORDER — SODIUM CHLORIDE 0.9 % (FLUSH) 0.9 %
5-40 SYRINGE (ML) INJECTION PRN
Status: ACTIVE | OUTPATIENT
Start: 2025-06-07 | End: 2025-06-08

## 2025-06-07 RX ORDER — EPINEPHRINE 1 MG/ML
0.3 INJECTION, SOLUTION INTRAMUSCULAR; SUBCUTANEOUS PRN
Status: DISCONTINUED | OUTPATIENT
Start: 2025-06-07 | End: 2025-06-08 | Stop reason: ALTCHOICE

## 2025-06-07 RX ORDER — MEPERIDINE HYDROCHLORIDE 25 MG/ML
12.5 INJECTION INTRAMUSCULAR; INTRAVENOUS; SUBCUTANEOUS PRN
Status: DISCONTINUED | OUTPATIENT
Start: 2025-06-07 | End: 2025-06-08 | Stop reason: ALTCHOICE

## 2025-06-07 RX ORDER — ACETAMINOPHEN 325 MG/1
650 TABLET ORAL
Status: DISCONTINUED | OUTPATIENT
Start: 2025-06-07 | End: 2025-06-08 | Stop reason: ALTCHOICE

## 2025-06-07 RX ORDER — HEPARIN 100 UNIT/ML
500 SYRINGE INTRAVENOUS PRN
Status: DISCONTINUED | OUTPATIENT
Start: 2025-06-07 | End: 2025-06-08 | Stop reason: ALTCHOICE

## 2025-06-07 RX ORDER — HYDROCORTISONE SODIUM SUCCINATE 100 MG/2ML
100 INJECTION INTRAMUSCULAR; INTRAVENOUS
Status: DISCONTINUED | OUTPATIENT
Start: 2025-06-08 | End: 2025-06-09 | Stop reason: ALTCHOICE

## 2025-06-07 RX ORDER — ONDANSETRON 2 MG/ML
8 INJECTION INTRAMUSCULAR; INTRAVENOUS
Status: DISCONTINUED | OUTPATIENT
Start: 2025-06-07 | End: 2025-06-08 | Stop reason: ALTCHOICE

## 2025-06-07 RX ADMIN — TAMSULOSIN HYDROCHLORIDE 0.4 MG: 0.4 CAPSULE ORAL at 09:39

## 2025-06-07 RX ADMIN — SODIUM CHLORIDE, PRESERVATIVE FREE 10 ML: 5 INJECTION INTRAVENOUS at 19:42

## 2025-06-07 RX ADMIN — SODIUM CHLORIDE, SODIUM LACTATE, POTASSIUM CHLORIDE, AND CALCIUM CHLORIDE: .6; .31; .03; .02 INJECTION, SOLUTION INTRAVENOUS at 09:42

## 2025-06-07 RX ADMIN — DEXAMETHASONE SODIUM PHOSPHATE 8 MG: 10 INJECTION, SOLUTION INTRAMUSCULAR; INTRAVENOUS at 11:38

## 2025-06-07 RX ADMIN — ETOPOSIDE 140 MG: 20 INJECTION, SOLUTION INTRAVENOUS at 12:20

## 2025-06-07 RX ADMIN — METOPROLOL SUCCINATE 25 MG: 25 TABLET, EXTENDED RELEASE ORAL at 09:39

## 2025-06-07 RX ADMIN — SODIUM CHLORIDE 25 ML/HR: 0.9 INJECTION, SOLUTION INTRAVENOUS at 11:50

## 2025-06-07 RX ADMIN — SODIUM CHLORIDE, SODIUM LACTATE, POTASSIUM CHLORIDE, AND CALCIUM CHLORIDE: .6; .31; .03; .02 INJECTION, SOLUTION INTRAVENOUS at 07:19

## 2025-06-07 RX ADMIN — SODIUM CHLORIDE, PRESERVATIVE FREE 10 ML: 5 INJECTION INTRAVENOUS at 09:39

## 2025-06-07 ASSESSMENT — PAIN SCALES - GENERAL
PAINLEVEL_OUTOF10: 0
PAINLEVEL_OUTOF10: 0

## 2025-06-07 NOTE — PLAN OF CARE
Problem: Safety - Adult  Goal: Free from fall injury  Outcome: Progressing     Problem: Occupational Therapy - Adult  Goal: By Discharge: Performs self-care activities at highest level of function for planned discharge setting.  See evaluation for individualized goals.  Description: FUNCTIONAL STATUS PRIOR TO ADMISSION:  pt stated independence w/ ADLs and mod I for mobility w/ RW   , Prior Level of Assist for ADLs: Independent,  ,  ,  ,  ,  , Prior Level of Assist for Homemaking: Independent,  , Prior Level of Assist for Transfers: Independent, Active : Yes     HOME SUPPORT: pt lived alone     Occupational Therapy Goals:  Initiated 6/3/2025  1.  Patient will perform upper body dressing with [place] within 7 day(s).  2.  Patient will perform grooming with Modified [place] within 7 day(s).  3.  Patient will perform lower body dressing with Modified [place] within 7 day(s).  4.  Patient will perform toilet transfers with Modified [place]  within 7 day(s).  5.  Patient will perform all aspects of toileting with [place] within 7 day(s).  6.  Patient will participate in upper extremity therapeutic exercise/activities with Supervision for 7 minutes within 7 day(s).    7.  Patient will utilize energy conservation techniques during functional activities with verbal cues within 7 day(s).   6/6/2025 1455 by Julianna Galvez, MIKO  Outcome: Progressing     Problem: Physical Therapy - Adult  Goal: By Discharge: Performs mobility at highest level of function for planned discharge setting.  See evaluation for individualized goals.  Description: FUNCTIONAL STATUS PRIOR TO ADMISSION: Patient was modified independent using a rolling walker for functional mobility, though initially reported does not use DME.     HOME SUPPORT PRIOR TO ADMISSION: The patient lived alone.    Physical Therapy Goals  Initiated 6/3/2025  1.  Patient will move from supine to sit and sit to supine, scoot up and down, and roll

## 2025-06-07 NOTE — PROGRESS NOTES
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 No CT evidence for pulmonary embolus at this time. Extrinsic compression of the right pulmonary arterial system and the right pulmonary veins by a right hilar mass.  2. Right hilar mass or clustered lymph nodes.  3. Mediastinal lymphadenopathy.  4. Incidental upper abdominal findings previously described.     : Patient was not oriented to time place and person, was on 2 L of oxygen the MAP was on the low side.  500 mL of IV fluid given.  Nuclear med bone scan ordered.  Will follow-up on the liver biopsy result.   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  Oncology has a family meeting today family need to choose between starting the chemotherapy while inpatient versus hospice.  NM bone scan with no significant findings  On : S/p left subclavian vein port insertion by general surgery.  As per oncology-patient would like to pursue 1 cycle of carbo etoposide.  Plan to proceed on  after port placement.  Patient will continue to stay till  for final dose of etoposide.  Discussed with CM plan to transition to SNF on  after completion of chemotherapy.  Patient is considering transition to hospice based on how he tolerates cycle 1.  : Tolerating chemotherapy.  Patient denies acute distress, currently on 4 L of oxygen           Cirrhosis, transaminitis POA  Due to Liver lesions concerning for metastatic disease POA  -appreciate oncology assistance  Liver biopsy result

## 2025-06-07 NOTE — PROGRESS NOTES
chloride infusion  5-250 mL/hr IntraVENous PRN Toni Owens MD        prochlorperazine (COMPAZINE) injection 5 mg  5 mg IntraVENous Once PRN Toni Owens MD        heparin (PF) 100 UNIT/ML injection 500 Units  500 Units IntraCATHeter PRN Toni Owens MD        ALTEplase (CATHFLO) 2 mg in sterile water 2 mL injection  2 mg IntraCATHeter PRN Toni Owens MD        0.9 % sodium chloride infusion   IntraVENous PRN Toni Owens MD        diphenhydrAMINE (BENADRYL) injection 50 mg  50 mg IntraVENous Once PRN Toni Owens MD        famotidine (PEPCID) 20 MG/2ML 20 mg in sodium chloride (PF) 0.9 % 10 mL injection  20 mg IntraVENous Once PRN Toni Owens MD        hydrocortisone sodium succinate PF (SOLU-CORTEF) injection 100 mg  100 mg IntraVENous Once PRN Toni Owens MD        acetaminophen (TYLENOL) tablet 650 mg  650 mg Oral Once PRN Toni Owens MD        meperidine (DEMEROL) injection 12.5 mg  12.5 mg IntraVENous PRN Toni Owens MD        ondansetron (ZOFRAN) injection 8 mg  8 mg IntraVENous Once PRN Toni Owens MD        EPINEPHrine 1 MG/ML injection 0.3 mg  0.3 mg IntraMUSCular PRN Toni Owens MD        albuterol (PROVENTIL) (2.5 MG/3ML) 0.083% nebulizer solution 2.5 mg  2.5 mg Nebulization PRN Amanda Whitney MD        lactated ringers infusion   IntraVENous Continuous AslamGuerita  mL/hr at 06/07/25 0942 New Bag at 06/07/25 0942    sodium chloride flush 0.9 % injection 5-40 mL  5-40 mL IntraVENous 2 times per day Megan Hancock APRN - NP   10 mL at 06/06/25 2145    sodium chloride flush 0.9 % injection 5-40 mL  5-40 mL IntraVENous PRN Megan Hancock APRN - NP        0.9 % sodium chloride infusion   IntraVENous PRN Abernathie, Megan, APRN - NP        ondansetron (ZOFRAN-ODT) disintegrating tablet 4 mg  4 mg Oral Q8H PRN Megan Hancock APRN - NP        Or    ondansetron (ZOFRAN) injection 4 mg  4 mg IntraVENous Q6H PRN Megan Hancock APRN - NP    MG/DL    BUN/Creatinine Ratio 27 (H) 12 - 20      Est, Glom Filt Rate 43 (L) >60 ml/min/1.73m2    Calcium 12.6 (H) 8.5 - 10.1 MG/DL    Total Bilirubin 1.0 0.2 - 1.0 MG/DL     (H) 12 - 78 U/L     (H) 15 - 37 U/L    Alk Phosphatase 296 (H) 45 - 117 U/L    Total Protein 5.9 (L) 6.4 - 8.2 g/dL    Albumin 2.4 (L) 3.5 - 5.0 g/dL    Globulin 3.5 2.0 - 4.0 g/dL    Albumin/Globulin Ratio 0.7 (L) 1.1 - 2.2     CBC with Auto Differential    Collection Time: 06/06/25  3:43 PM   Result Value Ref Range    WBC 5.7 4.1 - 11.1 K/uL    RBC 3.42 (L) 4.10 - 5.70 M/uL    Hemoglobin 10.2 (L) 12.1 - 17.0 g/dL    Hematocrit 31.6 (L) 36.6 - 50.3 %    MCV 92.4 80.0 - 99.0 FL    MCH 29.8 26.0 - 34.0 PG    MCHC 32.3 30.0 - 36.5 g/dL    RDW 14.5 11.5 - 14.5 %    Platelets 176 150 - 400 K/uL    MPV 11.6 8.9 - 12.9 FL    Nucleated RBCs 0.9 (H) 0  WBC    nRBC 0.05 (H) 0.00 - 0.01 K/uL    Neutrophils % 77 (H) 32.0 - 75.0 %    Lymphocytes % 9 (L) 12.0 - 49.0 %    Monocytes % 9 5.0 - 13.0 %    Eosinophils % 1 0.0 - 7.0 %    Basophils % 0 0.0 - 1.0 %    Metamyelocytes 3 %    Myelocytes 1 %    Immature Granulocytes % 0 0.0 - 0.5 %    Neutrophils Absolute 4.39 1.80 - 8.00 K/UL    Lymphocytes Absolute 0.51 (L) 0.80 - 3.50 K/UL    Monocytes Absolute 0.51 0.00 - 1.00 K/UL    Eosinophils Absolute 0.06 0.00 - 0.40 K/UL    Basophils Absolute 0.00 0.00 - 0.10 K/UL    Immature Granulocytes Absolute 0.00 0.00 - 0.04 K/UL    Differential Type MANUAL      RBC Comment NORMOCYTIC, NORMOCHROMIC     Comprehensive Metabolic Panel    Collection Time: 06/07/25  3:43 AM   Result Value Ref Range    Sodium 140 136 - 145 mmol/L    Potassium 4.2 3.5 - 5.1 mmol/L    Chloride 116 (H) 97 - 108 mmol/L    CO2 23 21 - 32 mmol/L    Anion Gap 1 (L) 2 - 12 mmol/L    Glucose 135 (H) 65 - 100 mg/dL    BUN 46 (H) 6 - 20 MG/DL    Creatinine 1.52 (H) 0.70 - 1.30 MG/DL    BUN/Creatinine Ratio 30 (H) 12 - 20      Est, Glom Filt Rate 45 (L) >60 ml/min/1.73m2    Calcium

## 2025-06-07 NOTE — PROGRESS NOTES
End of Shift Note    Bedside shift change report given to ZAY Bob (oncoming nurse) by ERNESTINE BURKS RN (offgoing nurse).  Report included the following information SBAR, Kardex, Intake/Output, and MAR    Shift worked:  0819-2823     Shift summary and any significant changes:     Patient had port placed in left chest. Chemotherapy completed. Patient tolerated well. Port accessed for chemo. Plan to do chemo through the weekend and discharge on the weekend.      Concerns for physician to address:    Zone phone for oncoming shift:   [phone]       Activity:  Level of Assistance: Maximum assist, patient does 25-49%  Number times ambulated in hallways past shift: 0  Number of times OOB to chair past shift: 0    Cardiac:   Cardiac Monitoring: Yes      Cardiac Rhythm: Sinus tachy    Access:  Current line(s): PIV  and port accessed    Genitourinary:   Urinary Status: Orozco, Patent, Draining    Respiratory:   O2 Device: Nasal cannula  Chronic home O2 use?: N/A  Incentive spirometer at bedside: YES    GI:  Last BM (including prior to admit): 06/05/25  Current diet:  ADULT DIET; Regular  ADULT ORAL NUTRITION SUPPLEMENT; Breakfast, Lunch, Dinner; Standard High Calorie/High Protein Oral Supplement  Passing flatus: YES    Pain Management:   Patient states pain is manageable on current regimen: N/A    Skin:  Brant Scale Score: 16  Interventions: Wound Offloading (Prevention Methods): Repositioning, Turning    Patient Safety:  Fall Risk: Nursing Judgement-Fall Risk High(Add Comments): Yes  Fall Risk Interventions  Nursing Judgement-Fall Risk High(Add Comments): Yes  Toilet Every 2 Hours-In Advance of Need: Yes  Hourly Visual Checks: Awake, In bed  Fall Visual Posted: Fall sign posted, Socks  Room Door Open: Yes  Alarm On: Bed  Patient Moved Closer to Nursing Station: No    Active Consults:   IP CONSULT TO PALLIATIVE CARE  IP CONSULT TO ONCOLOGY  IP CONSULT TO INTERVENTIONAL RADIOLOGY  IP CONSULT TO PULMONOLOGY  IP CONSULT  TO UROLOGY  IP CONSULT TO GENERAL SURGERY    Length of Stay:  Expected LOS: 7  Actual LOS: 4    ERNESTINE BURKS RN

## 2025-06-07 NOTE — PROGRESS NOTES
End of Shift Note    Bedside shift change report given to ZAY Callahan (oncoming nurse) by Lisbeth Allred RN (offgoing nurse).  Report included the following information SBAR, Kardex, MAR, and Recent Results    Shift worked: 1900-700     Shift summary and any significant changes:    Pt IV and port flushed. Labs collected. CHG bath complete this morning. Pt safety and caring rounds complete.      Concerns for physician to address: None     Zone phone for oncoming shift:  [phone]       Activity:  Level of Assistance: Maximum assist, patient does 25-49%  Number times ambulated in hallways past shift: 0  Number of times OOB to chair past shift: 0    Cardiac:   Cardiac Monitoring: Yes      Cardiac Rhythm: Sinus tachy    Access:  Current line(s): PIV  and port accessed    Genitourinary:   Urinary Status: Orozco, Patent, Draining    Respiratory:   O2 Device: Nasal cannula  Chronic home O2 use?: NO  Incentive spirometer at bedside: NO    GI:  Last BM (including prior to admit): 06/05/25  Current diet:  ADULT DIET; Regular  ADULT ORAL NUTRITION SUPPLEMENT; Breakfast, Lunch, Dinner; Standard High Calorie/High Protein Oral Supplement  Passing flatus: YES    Pain Management:   Patient states pain is manageable on current regimen: YES    Skin:  Brant Scale Score: 16  Interventions: Wound Offloading (Prevention Methods): Repositioning, Turning    Patient Safety:  Fall Risk: Nursing Judgement-Fall Risk High(Add Comments): Yes  Fall Risk Interventions  Nursing Judgement-Fall Risk High(Add Comments): Yes  Toilet Every 2 Hours-In Advance of Need: Yes  Hourly Visual Checks: Awake, In bed  Fall Visual Posted: Fall sign posted, Socks  Room Door Open: Yes  Alarm On: Bed  Patient Moved Closer to Nursing Station: No    Active Consults:   IP CONSULT TO PALLIATIVE CARE  IP CONSULT TO ONCOLOGY  IP CONSULT TO INTERVENTIONAL RADIOLOGY  IP CONSULT TO PULMONOLOGY  IP CONSULT TO UROLOGY  IP CONSULT TO GENERAL SURGERY    Length of Stay:  Expected LOS:

## 2025-06-07 NOTE — PROGRESS NOTES
Spiritual Health History and Assessment/Progress Note  [facility]    Initial Encounter,  ,  ,      Name: Francisca Norris MRN: [id number]    Age: 84 y.o.     Sex: male   Language: English   Christianity: Orthodox   Acute hypoxic respiratory failure (HCC)     Date: 6/7/2025            Total Time Calculated: 11 min              Spiritual Assessment began in [facility]        Referral/Consult From: Rounding   Encounter Overview/Reason: Initial Encounter  Service Provided For: Patient    Jayla, Belief, Meaning:   Patient identifies as spiritual, is connected with a jayla tradition or spiritual practice, and has beliefs or practices that help with coping during difficult times  Family/Friends No family/friends present      Importance and Influence:  Patient has spiritual/personal beliefs that influence decisions regarding their health  Family/Friends No family/friends present    Community:  Patient feels well-supported. Support system includes: Children  Family/Friends No family/friends present    Assessment and Plan of Care:     Patient Interventions include: Affirmed coping skills/support systems  Family/Friends Interventions include: No family/friends present    Patient Plan of Care: Spiritual Care available upon further referral  Family/Friends Plan of Care: No family/friends present    Electronically signed by Xuan Ringin Intern on 6/7/2025 at 2:23 PM

## 2025-06-07 NOTE — PROGRESS NOTES
.End of Shift Note    Bedside shift change report given to ZAY Bob (oncoming nurse) by Gladys Coffman RN (offgoing nurse).  Report included the following information SBAR, Kardex, and MAR    Shift worked: 7a-7p     Shift summary and any significant changes:    Medications given per MAR and education provided. Bath/CHG completed. Orozco and line care completed. Etoposide Day 2 Cycle 1 completed, tolerated well.     Plan is to complete Etoposide day 3 cycle 1 tomorrow at 10am.      Concerns for physician to address: N/A     [facility] phone for oncoming shift:  [phone]       Activity:  Level of Assistance: Maximum assist, patient does 25-49%  Number times ambulated in hallways past shift: 0  Number of times OOB to chair past shift: 0    Cardiac:   Cardiac Monitoring: Yes      Cardiac Rhythm: Sinus rhythm    Access:  Current line(s): PIV  and port accessed    Genitourinary:   Urinary Status: Orozco    Respiratory:   O2 Device: Nasal cannula  Chronic home O2 use?: NO  Incentive spirometer at bedside: NO    GI:  Last BM (including prior to admit): 06/05/25  Current diet:  ADULT DIET; Regular  ADULT ORAL NUTRITION SUPPLEMENT; Breakfast, Lunch, Dinner; Standard High Calorie/High Protein Oral Supplement  Passing flatus: YES    Pain Management:   Patient states pain is manageable on current regimen: YES    Skin:  Brant Scale Score: 17  Interventions: Wound Offloading (Prevention Methods): Bed, pressure reduction mattress, Elevate heels, Pillows, Repositioning, Turning    Patient Safety:  Fall Risk: Nursing Judgement-Fall Risk High(Add Comments): Yes  Fall Risk Interventions  Nursing Judgement-Fall Risk High(Add Comments): Yes  Toilet Every 2 Hours-In Advance of Need: Yes  Hourly Visual Checks: Awake, In bed  Fall Visual Posted: Armband, Socks  Room Door Open: Yes  Alarm On: Bed  Patient Moved Closer to Nursing Station: No    Active Consults:   IP CONSULT TO PALLIATIVE CARE  IP CONSULT TO ONCOLOGY  IP CONSULT TO INTERVENTIONAL  RADIOLOGY  IP CONSULT TO PULMONOLOGY  IP CONSULT TO UROLOGY  IP CONSULT TO GENERAL SURGERY    Length of Stay:  Expected LOS: 7  Actual LOS: 5    Gladys Coffman RN

## 2025-06-07 NOTE — PLAN OF CARE
Problem: Safety - Adult  Goal: Free from fall injury  6/7/2025 0909 by Gladys Coffman RN  Outcome: Progressing  6/7/2025 0039 by Lisbeth Allred RN  Outcome: Progressing  6/6/2025 2011 by Staci Penn RN  Outcome: Progressing     Problem: Pain  Goal: Verbalizes/displays adequate comfort level or baseline comfort level  6/7/2025 0909 by Gladys Coffman RN  Outcome: Progressing  6/7/2025 0039 by Lisbeth Allred RN  Outcome: Progressing  Flowsheets (Taken 6/6/2025 2019)  Verbalizes/displays adequate comfort level or baseline comfort level: Encourage patient to monitor pain and request assistance  6/6/2025 2011 by Staci Penn RN  Outcome: Progressing     Problem: Skin/Tissue Integrity  Goal: Skin integrity remains intact  Description: 1.  Monitor for areas of redness and/or skin breakdown2.  Assess vascular access sites hourly3.  Every 4-6 hours minimum:  Change oxygen saturation probe site4.  Every 4-6 hours:  If on nasal continuous positive airway pressure, respiratory therapy assess nares and determine need for appliance change or resting period  6/7/2025 0039 by Lsibeth Allred RN  Outcome: Progressing  6/6/2025 2011 by Staci Penn RN  Outcome: Progressing     Problem: ABCDS Injury Assessment  Goal: Absence of physical injury  6/7/2025 0039 by Lisbeth Allred RN  Outcome: Progressing  6/6/2025 2011 by Staci Penn RN  Outcome: Progressing

## 2025-06-07 NOTE — PLAN OF CARE
Problem: Safety - Adult  Goal: Free from fall injury  6/7/2025 0039 by Lisbeth Allred, RN  Outcome: Progressing  6/6/2025 2011 by Staci Penn RN  Outcome: Progressing     Problem: Occupational Therapy - Adult  Goal: By Discharge: Performs self-care activities at highest level of function for planned discharge setting.  See evaluation for individualized goals.  Description: FUNCTIONAL STATUS PRIOR TO ADMISSION:  pt stated independence w/ ADLs and mod I for mobility w/ RW   , Prior Level of Assist for ADLs: Independent,  ,  ,  ,  ,  , Prior Level of Assist for Homemaking: Independent,  , Prior Level of Assist for Transfers: Independent, Active : Yes     HOME SUPPORT: pt lived alone     Occupational Therapy Goals:  Initiated 6/3/2025  1.  Patient will perform upper body dressing with [place]t within 7 day(s).  2.  Patient will perform grooming with Modified [place] within 7 day(s).  3.  Patient will perform lower body dressing with Modified [place] within 7 day(s).  4.  Patient will perform toilet transfers with Modified [place]  within 7 day(s).  5.  Patient will perform all aspects of toileting with [place]t within 7 day(s).  6.  Patient will participate in upper extremity therapeutic exercise/activities with Supervision for 7 minutes within 7 day(s).    7.  Patient will utilize energy conservation techniques during functional activities with verbal cues within 7 day(s).   6/6/2025 1455 by Julianna Galvez, OT  Outcome: Progressing     Problem: Physical Therapy - Adult  Goal: By Discharge: Performs mobility at highest level of function for planned discharge setting.  See evaluation for individualized goals.  Description: FUNCTIONAL STATUS PRIOR TO ADMISSION: Patient was modified independent using a rolling walker for functional mobility, though initially reported does not use DME.     HOME SUPPORT PRIOR TO ADMISSION: The patient lived alone.    Physical Therapy  Goals  Initiated 6/3/2025  1.  Patient will move from supine to sit and sit to supine, scoot up and down, and roll side to side in bed with modified independence within 7 day(s).    2.  Patient will perform sit to stand with modified independence within 7 day(s).  3.  Patient will transfer from bed to chair and chair to bed with modified independence using the least restrictive device within 7 day(s).  4.  Patient will ambulate with modified independence for 150 feet with the least restrictive device within 7 day(s).   5.  Patient will ascend/descend 3 stairs with 1 handrail(s) with modified independence within 7 day(s).   6/6/2025 1311 by Debi Garcia, PT  Outcome: Progressing     Problem: Pain  Goal: Verbalizes/displays adequate comfort level or baseline comfort level  6/7/2025 0039 by Lisbeth Allred RN  Outcome: Progressing  6/6/2025 2011 by Staci Penn RN  Outcome: Progressing     Problem: Skin/Tissue Integrity  Goal: Skin integrity remains intact  Description: 1.  Monitor for areas of redness and/or skin breakdown2.  Assess vascular access sites hourly3.  Every 4-6 hours minimum:  Change oxygen saturation probe site4.  Every 4-6 hours:  If on nasal continuous positive airway pressure, respiratory therapy assess nares and determine need for appliance change or resting period  6/7/2025 0039 by Lisbeth Allred RN  Outcome: Progressing  6/6/2025 2011 by Staci Penn, RN  Outcome: Progressing     Problem: ABCDS Injury Assessment  Goal: Absence of physical injury  6/7/2025 0039 by Lisbeth Allred RN  Outcome: Progressing  6/6/2025 2011 by Staci Penn, RN  Outcome: Progressing

## 2025-06-08 LAB
ALBUMIN SERPL-MCNC: 2.2 G/DL (ref 3.5–5)
ALBUMIN/GLOB SERPL: 0.6 (ref 1.1–2.2)
ALP SERPL-CCNC: 283 U/L (ref 45–117)
ALT SERPL-CCNC: 130 U/L (ref 12–78)
ANION GAP SERPL CALC-SCNC: 2 MMOL/L (ref 2–12)
AST SERPL-CCNC: 213 U/L (ref 15–37)
BASOPHILS # BLD: 0 K/UL (ref 0–0.1)
BASOPHILS NFR BLD: 0 % (ref 0–1)
BILIRUB SERPL-MCNC: 0.5 MG/DL (ref 0.2–1)
BUN SERPL-MCNC: 71 MG/DL (ref 6–20)
BUN/CREAT SERPL: 44 (ref 12–20)
CALCIUM SERPL-MCNC: 11 MG/DL (ref 8.5–10.1)
CHLORIDE SERPL-SCNC: 112 MMOL/L (ref 97–108)
CO2 SERPL-SCNC: 23 MMOL/L (ref 21–32)
CREAT SERPL-MCNC: 1.62 MG/DL (ref 0.7–1.3)
DIFFERENTIAL METHOD BLD: ABNORMAL
EOSINOPHIL # BLD: 0 K/UL (ref 0–0.4)
EOSINOPHIL NFR BLD: 0 % (ref 0–7)
ERYTHROCYTE [DISTWIDTH] IN BLOOD BY AUTOMATED COUNT: 14.6 % (ref 11.5–14.5)
GLOBULIN SER CALC-MCNC: 3.4 G/DL (ref 2–4)
GLUCOSE SERPL-MCNC: 135 MG/DL (ref 65–100)
HCT VFR BLD AUTO: 32 % (ref 36.6–50.3)
HGB BLD-MCNC: 10.4 G/DL (ref 12.1–17)
IMM GRANULOCYTES # BLD AUTO: 0 K/UL (ref 0–0.04)
IMM GRANULOCYTES NFR BLD AUTO: 0 % (ref 0–0.5)
LYMPHOCYTES # BLD: 0.25 K/UL (ref 0.8–3.5)
LYMPHOCYTES NFR BLD: 4 % (ref 12–49)
MCH RBC QN AUTO: 29.8 PG (ref 26–34)
MCHC RBC AUTO-ENTMCNC: 32.5 G/DL (ref 30–36.5)
MCV RBC AUTO: 91.7 FL (ref 80–99)
METAMYELOCYTES NFR BLD MANUAL: 1 %
MONOCYTES # BLD: 0.37 K/UL (ref 0–1)
MONOCYTES NFR BLD: 6 % (ref 5–13)
NEUTS BAND NFR BLD MANUAL: 18 %
NEUTS SEG # BLD: 5.52 K/UL (ref 1.8–8)
NEUTS SEG NFR BLD: 71 % (ref 32–75)
NRBC # BLD: 0.02 K/UL (ref 0–0.01)
NRBC BLD-RTO: 0.3 PER 100 WBC
PLATELET # BLD AUTO: 181 K/UL (ref 150–400)
PMV BLD AUTO: 11.4 FL (ref 8.9–12.9)
POTASSIUM SERPL-SCNC: 4.5 MMOL/L (ref 3.5–5.1)
PROT SERPL-MCNC: 5.6 G/DL (ref 6.4–8.2)
RBC # BLD AUTO: 3.49 M/UL (ref 4.1–5.7)
RBC MORPH BLD: ABNORMAL
SODIUM SERPL-SCNC: 137 MMOL/L (ref 136–145)
WBC # BLD AUTO: 6.2 K/UL (ref 4.1–11.1)

## 2025-06-08 PROCEDURE — 85025 COMPLETE CBC W/AUTO DIFF WBC: CPT

## 2025-06-08 PROCEDURE — 51702 INSERT TEMP BLADDER CATH: CPT

## 2025-06-08 PROCEDURE — 2700000000 HC OXYGEN THERAPY PER DAY

## 2025-06-08 PROCEDURE — 2580000003 HC RX 258: Performed by: INTERNAL MEDICINE

## 2025-06-08 PROCEDURE — 1100000000 HC RM PRIVATE

## 2025-06-08 PROCEDURE — 6370000000 HC RX 637 (ALT 250 FOR IP): Performed by: NURSE PRACTITIONER

## 2025-06-08 PROCEDURE — 80053 COMPREHEN METABOLIC PANEL: CPT

## 2025-06-08 PROCEDURE — 36415 COLL VENOUS BLD VENIPUNCTURE: CPT

## 2025-06-08 PROCEDURE — 6370000000 HC RX 637 (ALT 250 FOR IP)

## 2025-06-08 PROCEDURE — 2580000003 HC RX 258: Performed by: STUDENT IN AN ORGANIZED HEALTH CARE EDUCATION/TRAINING PROGRAM

## 2025-06-08 PROCEDURE — 2500000003 HC RX 250 WO HCPCS

## 2025-06-08 PROCEDURE — 6360000002 HC RX W HCPCS: Performed by: INTERNAL MEDICINE

## 2025-06-08 RX ADMIN — METOPROLOL SUCCINATE 25 MG: 25 TABLET, EXTENDED RELEASE ORAL at 08:25

## 2025-06-08 RX ADMIN — SODIUM CHLORIDE, PRESERVATIVE FREE 10 ML: 5 INJECTION INTRAVENOUS at 08:26

## 2025-06-08 RX ADMIN — TAMSULOSIN HYDROCHLORIDE 0.4 MG: 0.4 CAPSULE ORAL at 08:25

## 2025-06-08 RX ADMIN — SODIUM CHLORIDE 25 ML/HR: 9 INJECTION, SOLUTION INTRAVENOUS at 10:14

## 2025-06-08 RX ADMIN — SODIUM CHLORIDE, SODIUM LACTATE, POTASSIUM CHLORIDE, AND CALCIUM CHLORIDE: .6; .31; .03; .02 INJECTION, SOLUTION INTRAVENOUS at 18:29

## 2025-06-08 RX ADMIN — DEXAMETHASONE SODIUM PHOSPHATE 8 MG: 10 INJECTION, SOLUTION INTRAMUSCULAR; INTRAVENOUS at 09:38

## 2025-06-08 RX ADMIN — SODIUM CHLORIDE, PRESERVATIVE FREE 10 ML: 5 INJECTION INTRAVENOUS at 19:26

## 2025-06-08 RX ADMIN — ETOPOSIDE 140 MG: 20 INJECTION, SOLUTION INTRAVENOUS at 10:15

## 2025-06-08 ASSESSMENT — PAIN SCALES - GENERAL: PAINLEVEL_OUTOF10: 0

## 2025-06-08 NOTE — PROGRESS NOTES
End of Shift Note    Bedside shift change report given to ZAY Callahan (oncoming nurse) by Lisbeth Allred RN (offgoing nurse).  Report included the following information SBAR, Kardex, MAR, and Recent Results    Shift worked: 1900-700     Shift summary and any significant changes:    Pt IV and port flushed. Labs collected. Port and frankel care complete this morning. Pt rested well during shift. Pt safety and caring rounds complete.      Concerns for physician to address: None     Zone phone for oncoming shift:  [phone]       Activity:  Level of Assistance: Maximum assist, patient does 25-49%  Number times ambulated in hallways past shift: 0  Number of times OOB to chair past shift: 0    Cardiac:   Cardiac Monitoring: Yes      Cardiac Rhythm: Sinus rhythm    Access:  Current line(s): PIV  and port accessed    Genitourinary:   Urinary Status: Frankel    Respiratory:   O2 Device: Nasal cannula  Chronic home O2 use?: NO  Incentive spirometer at bedside: NO    GI:  Last BM (including prior to admit): 06/05/25  Current diet:  ADULT DIET; Regular  ADULT ORAL NUTRITION SUPPLEMENT; Breakfast, Lunch, Dinner; Standard High Calorie/High Protein Oral Supplement  Passing flatus: YES    Pain Management:   Patient states pain is manageable on current regimen: YES    Skin:  Brant Scale Score: 17  Interventions: Wound Offloading (Prevention Methods): Bed, pressure reduction mattress, Elevate heels, Pillows, Repositioning, Turning    Patient Safety:  Fall Risk: Nursing Judgement-Fall Risk High(Add Comments): Yes  Fall Risk Interventions  Nursing Judgement-Fall Risk High(Add Comments): Yes  Toilet Every 2 Hours-In Advance of Need: Yes  Hourly Visual Checks: Awake, In bed  Fall Visual Posted: Armband, Socks  Room Door Open: Yes  Alarm On: Bed  Patient Moved Closer to Nursing Station: No    Active Consults:   IP CONSULT TO PALLIATIVE CARE  IP CONSULT TO ONCOLOGY  IP CONSULT TO INTERVENTIONAL RADIOLOGY  IP CONSULT TO PULMONOLOGY  IP CONSULT TO

## 2025-06-08 NOTE — PROGRESS NOTES
.End of Shift Note    Bedside shift change report given to Lisbeth RN (oncoming nurse) by Gladys Coffman RN (offgoing nurse).  Report included the following information SBAR, Kardex, and MAR    Shift worked: 7a-7p     Shift summary and any significant changes:    Medications given per MAR and education provided. Orozco and line care completed. Etoposide Day 3 Cycle 1 completed, tolerated well.     Plan is to possibly D/C to [facility] tomorrow, 6/9.      Concerns for physician to address: N/A     [facility] phone for oncoming shift:  [phone]       Activity:  Level of Assistance: Maximum assist, patient does 25-49%  Number times ambulated in hallways past shift: 0  Number of times OOB to chair past shift: 0    Cardiac:   Cardiac Monitoring: Yes      Cardiac Rhythm: Sinus rhythm    Access:  Current line(s): PIV  and port accessed    Genitourinary:   Urinary Status: Orozco    Respiratory:   O2 Device: Nasal cannula  Chronic home O2 use?: NO  Incentive spirometer at bedside: NO    GI:  Last BM (including prior to admit): 06/05/25  Current diet:  ADULT DIET; Regular  ADULT ORAL NUTRITION SUPPLEMENT; Breakfast, Lunch, Dinner; Standard High Calorie/High Protein Oral Supplement  Passing flatus: YES    Pain Management:   Patient states pain is manageable on current regimen: YES    Skin:  Brant Scale Score: 14  Interventions: Wound Offloading (Prevention Methods): Bed, pressure reduction mattress, Elevate heels, Pillows, Repositioning, Turning    Patient Safety:  Fall Risk: Nursing Judgement-Fall Risk High(Add Comments): Yes  Fall Risk Interventions  Nursing Judgement-Fall Risk High(Add Comments): Yes  Toilet Every 2 Hours-In Advance of Need: Yes  Hourly Visual Checks: Awake, In bed  Fall Visual Posted: Armband, Socks  Room Door Open: Yes  Alarm On: Bed  Patient Moved Closer to Nursing Station: No    Active Consults:   IP CONSULT TO PALLIATIVE CARE  IP CONSULT TO ONCOLOGY  IP CONSULT TO INTERVENTIONAL RADIOLOGY  IP  CONSULT TO PULMONOLOGY  IP CONSULT TO UROLOGY  IP CONSULT TO GENERAL SURGERY    Length of Stay:  Expected LOS: 7  Actual LOS: 6    Gladys Coffman RN

## 2025-06-08 NOTE — PROGRESS NOTES
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 No CT evidence for pulmonary embolus at this time. Extrinsic compression of the right pulmonary arterial system and the right pulmonary veins by a right hilar mass.  2. Right hilar mass or clustered lymph nodes.  3. Mediastinal lymphadenopathy.  4. Incidental upper abdominal findings previously described.     : Patient was not oriented to time place and person, was on 2 L of oxygen the MAP was on the low side.  500 mL of IV fluid given.  Nuclear med bone scan ordered.  Will follow-up on the liver biopsy result.   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  Oncology has a family meeting today family need to choose between starting the chemotherapy while inpatient versus hospice.  NM bone scan with no significant findings  On : S/p left subclavian vein port insertion by general surgery.  As per oncology-patient would like to pursue 1 cycle of carbo etoposide.  Plan to proceed on  after port placement.  Patient will continue to stay till  for final dose of etoposide.  Discussed with CM plan to transition to SNF on  after completion of chemotherapy.  Patient is considering transition to hospice based on how he tolerates cycle 1.  : Tolerating chemotherapy.  Patient denies acute distress, currently on 4 L of oxygen  : Finished chemotherapy today, on 4 L of oxygen.  No acute distress.  Discussed with daughter in person.  Will need oncologist clearance tomorrow before

## 2025-06-09 ENCOUNTER — APPOINTMENT (OUTPATIENT)
Facility: HOSPITAL | Age: 85
DRG: 180 | End: 2025-06-09
Attending: STUDENT IN AN ORGANIZED HEALTH CARE EDUCATION/TRAINING PROGRAM
Payer: MEDICARE

## 2025-06-09 PROBLEM — E44.0 MODERATE PROTEIN-CALORIE MALNUTRITION: Status: ACTIVE | Noted: 2025-06-09

## 2025-06-09 LAB
ALBUMIN SERPL-MCNC: 2.1 G/DL (ref 3.5–5)
ALBUMIN/GLOB SERPL: 0.6 (ref 1.1–2.2)
ALP SERPL-CCNC: 284 U/L (ref 45–117)
ALT SERPL-CCNC: 155 U/L (ref 12–78)
ANION GAP SERPL CALC-SCNC: 3 MMOL/L (ref 2–12)
AST SERPL-CCNC: 274 U/L (ref 15–37)
BASOPHILS # BLD: 0 K/UL (ref 0–0.1)
BASOPHILS NFR BLD: 0 % (ref 0–1)
BILIRUB SERPL-MCNC: 0.5 MG/DL (ref 0.2–1)
BUN SERPL-MCNC: 80 MG/DL (ref 6–20)
BUN/CREAT SERPL: 54 (ref 12–20)
CALCIUM SERPL-MCNC: 9.8 MG/DL (ref 8.5–10.1)
CHLORIDE SERPL-SCNC: 111 MMOL/L (ref 97–108)
CO2 SERPL-SCNC: 21 MMOL/L (ref 21–32)
CREAT SERPL-MCNC: 1.48 MG/DL (ref 0.7–1.3)
DIFFERENTIAL METHOD BLD: ABNORMAL
EOSINOPHIL # BLD: 0 K/UL (ref 0–0.4)
EOSINOPHIL NFR BLD: 0 % (ref 0–7)
ERYTHROCYTE [DISTWIDTH] IN BLOOD BY AUTOMATED COUNT: 14.6 % (ref 11.5–14.5)
GLOBULIN SER CALC-MCNC: 3.6 G/DL (ref 2–4)
GLUCOSE BLD STRIP.AUTO-MCNC: 136 MG/DL (ref 65–117)
GLUCOSE SERPL-MCNC: 108 MG/DL (ref 65–100)
HCT VFR BLD AUTO: 32.2 % (ref 36.6–50.3)
HGB BLD-MCNC: 10.4 G/DL (ref 12.1–17)
IMM GRANULOCYTES # BLD AUTO: 0 K/UL (ref 0–0.04)
IMM GRANULOCYTES NFR BLD AUTO: 0 % (ref 0–0.5)
LYMPHOCYTES # BLD: 0.45 K/UL (ref 0.8–3.5)
LYMPHOCYTES NFR BLD: 10 % (ref 12–49)
MCH RBC QN AUTO: 29.8 PG (ref 26–34)
MCHC RBC AUTO-ENTMCNC: 32.3 G/DL (ref 30–36.5)
MCV RBC AUTO: 92.3 FL (ref 80–99)
MONOCYTES # BLD: 0.09 K/UL (ref 0–1)
MONOCYTES NFR BLD: 2 % (ref 5–13)
NEUTS BAND NFR BLD MANUAL: 4 %
NEUTS SEG # BLD: 3.96 K/UL (ref 1.8–8)
NEUTS SEG NFR BLD: 84 % (ref 32–75)
NRBC # BLD: 0.02 K/UL (ref 0–0.01)
NRBC BLD-RTO: 0.4 PER 100 WBC
NT PRO BNP: 4404 PG/ML
PLATELET # BLD AUTO: 141 K/UL (ref 150–400)
PMV BLD AUTO: 12.2 FL (ref 8.9–12.9)
POTASSIUM SERPL-SCNC: 5.2 MMOL/L (ref 3.5–5.1)
PROT SERPL-MCNC: 5.7 G/DL (ref 6.4–8.2)
RBC # BLD AUTO: 3.49 M/UL (ref 4.1–5.7)
RBC MORPH BLD: ABNORMAL
SERVICE CMNT-IMP: ABNORMAL
SODIUM SERPL-SCNC: 135 MMOL/L (ref 136–145)
WBC # BLD AUTO: 4.5 K/UL (ref 4.1–11.1)

## 2025-06-09 PROCEDURE — 6360000002 HC RX W HCPCS: Performed by: SURGERY

## 2025-06-09 PROCEDURE — 6370000000 HC RX 637 (ALT 250 FOR IP): Performed by: SURGERY

## 2025-06-09 PROCEDURE — 97530 THERAPEUTIC ACTIVITIES: CPT

## 2025-06-09 PROCEDURE — 2500000003 HC RX 250 WO HCPCS: Performed by: SURGERY

## 2025-06-09 PROCEDURE — 36415 COLL VENOUS BLD VENIPUNCTURE: CPT

## 2025-06-09 PROCEDURE — 85025 COMPLETE CBC W/AUTO DIFF WBC: CPT

## 2025-06-09 PROCEDURE — 1100000000 HC RM PRIVATE

## 2025-06-09 PROCEDURE — 51798 US URINE CAPACITY MEASURE: CPT

## 2025-06-09 PROCEDURE — 71045 X-RAY EXAM CHEST 1 VIEW: CPT

## 2025-06-09 PROCEDURE — 2580000003 HC RX 258: Performed by: SURGERY

## 2025-06-09 PROCEDURE — 80053 COMPREHEN METABOLIC PANEL: CPT

## 2025-06-09 PROCEDURE — 83880 ASSAY OF NATRIURETIC PEPTIDE: CPT

## 2025-06-09 PROCEDURE — 82962 GLUCOSE BLOOD TEST: CPT

## 2025-06-09 RX ADMIN — METOPROLOL SUCCINATE 25 MG: 25 TABLET, EXTENDED RELEASE ORAL at 09:35

## 2025-06-09 RX ADMIN — SODIUM CHLORIDE, SODIUM LACTATE, POTASSIUM CHLORIDE, AND CALCIUM CHLORIDE: .6; .31; .03; .02 INJECTION, SOLUTION INTRAVENOUS at 15:23

## 2025-06-09 RX ADMIN — TAMSULOSIN HYDROCHLORIDE 0.4 MG: 0.4 CAPSULE ORAL at 09:35

## 2025-06-09 RX ADMIN — HYDROMORPHONE HYDROCHLORIDE 0.5 MG: 1 INJECTION, SOLUTION INTRAMUSCULAR; INTRAVENOUS; SUBCUTANEOUS at 17:19

## 2025-06-09 RX ADMIN — SODIUM CHLORIDE, PRESERVATIVE FREE 10 ML: 5 INJECTION INTRAVENOUS at 09:36

## 2025-06-09 ASSESSMENT — PAIN SCALES - GENERAL
PAINLEVEL_OUTOF10: 0
PAINLEVEL_OUTOF10: 7
PAINLEVEL_OUTOF10: 0

## 2025-06-09 ASSESSMENT — PAIN DESCRIPTION - PAIN TYPE: TYPE: ACUTE PAIN

## 2025-06-09 ASSESSMENT — PAIN DESCRIPTION - ORIENTATION: ORIENTATION: POSTERIOR

## 2025-06-09 ASSESSMENT — PAIN - FUNCTIONAL ASSESSMENT: PAIN_FUNCTIONAL_ASSESSMENT: PREVENTS OR INTERFERES SOME ACTIVE ACTIVITIES AND ADLS

## 2025-06-09 ASSESSMENT — PAIN DESCRIPTION - DESCRIPTORS: DESCRIPTORS: ACHING

## 2025-06-09 ASSESSMENT — PAIN DESCRIPTION - ONSET: ONSET: ON-GOING

## 2025-06-09 ASSESSMENT — PAIN DESCRIPTION - FREQUENCY: FREQUENCY: INTERMITTENT

## 2025-06-09 ASSESSMENT — PAIN DESCRIPTION - LOCATION: LOCATION: BACK

## 2025-06-09 NOTE — PROGRESS NOTES
Pulmonary Progress Note    C/c: acute hypoxic respiratory failure, lung cancer     Assessment     Acute hypoxic respiratory failure  Extensive stage small cell carcinoma lung  Liver mets from #2  Mediastinal lymphadenopathy, presumed mets  Acute kidney injury  Cirrhosis  Liver lesions      Management plan    Patient is requiring 4L supplemental oxygen. Titrate to keep O2 sats above 92%  CTA of chest showed a right hilar mass that is compressing the right pulm artery and mediastinal lymphadenopathy  Liver biopsy showed small cell carcinoma  Overall prognosis poor, however, patient opted for chemotherapy.  Has had first dose of chemo on 6/6 with carbo etoposide    Spoke with 3 daughters at bedside and answered all their questions.    Total time of encounter > 50 min    Rekha Alcazar MD   [facility]   [phone] [facility]  [phone] ([phone]) Office  [phone] Fax                    Hospital interval:    6/4 -patient lying comfortably in bed.  No events overnight.  Discussed plan with patient regarding bronchoscopy/EBUS.   6/5 -patient sitting comfortably in bed on 2 L supplemental oxygen, no distress.  6/6  -patient sitting up in bed with family at bedside, just got back from port placement.  6/7 - Lying comfortably in bed, about to start chemo. On 4L supplemental oxygen  6/8 - Sitting in bed resting quietly, no distress. Remains on 4L supplemental oxygen  6/9 -patient looking weak and lethargic.  On O2 4LPM        HPI: Francisca Norris is a 84 y.o.  male with PMHx significant for CAD, hypercholesterolemia, HTN, sciatica who presented to [facility] for complaints of weakness, back and leg pain. He complained he has had this pain for 2-3 weeks and is concerned about falling. CT imaging of the abdomen was done to rule out PE due to SOB requiring 2L nasal cannula. Imaging showed lung lesion with possible mets to the liver.     The patient was a smoker for about 25 years, he quit in 1993. Smoked about 2 ppd.    He  Donte GARCIA MD        dextrose 10 % infusion   IntraVENous Continuous PRN Donte Rios MD        0.9 % sodium chloride infusion  5-250 mL/hr IntraVENous PRN Toni Owens MD 25 mL/hr at 06/08/25 1014 25 mL/hr at 06/08/25 1014    prochlorperazine (COMPAZINE) injection 5 mg  5 mg IntraVENous Once PRN Toni Owens MD        0.9 % sodium chloride infusion   IntraVENous PRN Toni Owens MD        albuterol (PROVENTIL) (2.5 MG/3ML) 0.083% nebulizer solution 2.5 mg  2.5 mg Nebulization PRN Amanda Whitney MD        lactated ringers infusion   IntraVENous Continuous Donte Rios  mL/hr at 06/08/25 1829 New Bag at 06/08/25 1829    sodium chloride flush 0.9 % injection 5-40 mL  5-40 mL IntraVENous 2 times per day Donte Rios MD   10 mL at 06/09/25 0936    0.9 % sodium chloride infusion   IntraVENous PRN Donte Rios MD        ondansetron (ZOFRAN-ODT) disintegrating tablet 4 mg  4 mg Oral Q8H PRN Donte Rios MD        Or    ondansetron (ZOFRAN) injection 4 mg  4 mg IntraVENous Q6H PRN Donte Rios MD        polyethylene glycol (GLYCOLAX) packet 17 g  17 g Oral Daily PRN Donte Rios MD        acetaminophen (TYLENOL) tablet 650 mg  650 mg Oral Q6H PRN Donte Rios MD   650 mg at 06/03/25 1703    Or    acetaminophen (TYLENOL) suppository 650 mg  650 mg Rectal Q6H PRN Donte Rios MD        [Held by provider] atorvastatin (LIPITOR) tablet 40 mg  40 mg Oral Nightly Megan Hancock APRN - NP        [Held by provider] hydroCHLOROthiazide (HYDRODIURIL) tablet 25 mg  25 mg Oral Daily Megan Hancock APRN - NP        [Held by provider] losartan (COZAAR) tablet 25 mg  25 mg Oral Daily Abernathie, Megan, APRN - NP        metoprolol succinate (TOPROL XL) extended release tablet 25 mg  25 mg Oral Daily Donte Rios MD   25 mg at 06/09/25 0935    melatonin tablet 3 mg  3 mg Oral Nightly PRN Donte Rios MD

## 2025-06-09 NOTE — PROGRESS NOTES
End of Shift Note    Bedside shift change report given to ZAY Covington (oncoming nurse) by Lisbeth Allred RN (offgoing nurse).  Report included the following information SBAR, Kardex, MAR, and Recent Results    Shift worked: 1900-700     Shift summary and any significant changes:    Pt IV and port flushed. Port and frankel care complete this morning. Pt safety and caring rounds complete.      Concerns for physician to address: None     Zone phone for oncoming shift:  [phone]       Activity:  Level of Assistance: Maximum assist, patient does 25-49%  Number times ambulated in hallways past shift: 0  Number of times OOB to chair past shift: 0    Cardiac:   Cardiac Monitoring: Yes      Cardiac Rhythm: Sinus rhythm    Access:  Current line(s): PIV  and port accessed    Genitourinary:   Urinary Status: Frankel, Draining    Respiratory:   O2 Device: Nasal cannula  Chronic home O2 use?: NO  Incentive spirometer at bedside: NO    GI:  Last BM (including prior to admit): 06/08/25  Current diet:  ADULT DIET; Regular  ADULT ORAL NUTRITION SUPPLEMENT; Breakfast, Lunch, Dinner; Standard High Calorie/High Protein Oral Supplement  Passing flatus: YES    Pain Management:   Patient states pain is manageable on current regimen: YES    Skin:  Brant Scale Score: 14  Interventions: Wound Offloading (Prevention Methods): Bed, pressure reduction mattress, Elevate heels, Pillows, Repositioning, Turning    Patient Safety:  Fall Risk: Nursing Judgement-Fall Risk High(Add Comments): Yes  Fall Risk Interventions  Nursing Judgement-Fall Risk High(Add Comments): Yes  Toilet Every 2 Hours-In Advance of Need: Yes  Hourly Visual Checks: Awake, In bed  Fall Visual Posted: Armband, Socks  Room Door Open: Yes  Alarm On: Bed  Patient Moved Closer to Nursing Station: No    Active Consults:   IP CONSULT TO PALLIATIVE CARE  IP CONSULT TO ONCOLOGY  IP CONSULT TO INTERVENTIONAL RADIOLOGY  IP CONSULT TO PULMONOLOGY  IP CONSULT TO UROLOGY  IP CONSULT TO GENERAL

## 2025-06-09 NOTE — PROGRESS NOTES
Pulmonary Progress Note    C/c: acute hypoxic respiratory failure, R hilar mass with compression of R pulmonary artery    Assessment   Acute hypoxic respiratory failure  R hilar mass with compression of R pulmonary artery  Metastatic small cell lung cancer  Mediastinal lymphadenopathy  Acute kidney injury  Cirrhosis  Liver lesions      Plan  Patient is requiring 2L supplemental oxygen. Titrate to keep O2 sats above 90%  CTA of chest showed a right hilar mass that is compressing the right pulm artery and mediastinal lymphadenopathy  Liver biopsy performed, report showed small cell carcinoma, positive for malignancy.  Started chemo  Low dose oral morphine may help with symptom management.  Consider palliative care evaluation for symptom control   He is high risk for vascular complications.   Family considering overal GOC. Will discuss further with Onc if addition tissue is needed, unclear if this would .          Radha Cline, ZACHARIAHP-C  [facility]   [phone] [facility]  [phone] ([phone]) Office  [phone] Fax        Hospital interval:  6/4 -patient lying comfortably in bed.  No events overnight.  Discussed plan with patient regarding bronchoscopy/EBUS.   6/5 -patient sitting comfortably in bed on 2 L supplemental oxygen, no distress.  6/6  -patient sitting up in bed with family at bedside, just got back from port placement.  6/7 - Lying comfortably in bed, about to start chemo. On 4L supplemental oxygen  6/8 - Sitting in bed resting quietly, no distress. Remains on 4L supplemental oxygen        HPI: Francisca Norris is a 84 y.o.  male with PMHx significant for CAD, hypercholesterolemia, HTN, sciatica who presented to [facility] for complaints of weakness, back and leg pain. He complained he has had this pain for 2-3 weeks and is concerned about falling. CT imaging of the abdomen was done to rule out PE due to SOB requiring 2L nasal cannula. Imaging showed lung lesion with possible mets to the  9.02\")   Wt 75 kg (165 lb 5.5 oz)   SpO2 96%   BMI 24.41 kg/m²  O2 Device: Nasal cannula    Physical exam:      General : Alert, no apparent distress  HEENT : Oral mucosa is moist  Lungs: Normal breath sounds.   Heart : Regular with normal S1 and S2  Abdomen: Soft and non-tender  Extremities: Warm, no edema  Neuro: follows commands        Lab/Diagnostic Studies:    Recent Results (from the past 24 hours)   Comprehensive Metabolic Panel    Collection Time: 06/08/25  3:48 AM   Result Value Ref Range    Sodium 137 136 - 145 mmol/L    Potassium 4.5 3.5 - 5.1 mmol/L    Chloride 112 (H) 97 - 108 mmol/L    CO2 23 21 - 32 mmol/L    Anion Gap 2 2 - 12 mmol/L    Glucose 135 (H) 65 - 100 mg/dL    BUN 71 (H) 6 - 20 MG/DL    Creatinine 1.62 (H) 0.70 - 1.30 MG/DL    BUN/Creatinine Ratio 44 (H) 12 - 20      Est, Glom Filt Rate 42 (L) >60 ml/min/1.73m2    Calcium 11.0 (H) 8.5 - 10.1 MG/DL    Total Bilirubin 0.5 0.2 - 1.0 MG/DL     (H) 12 - 78 U/L     (H) 15 - 37 U/L    Alk Phosphatase 283 (H) 45 - 117 U/L    Total Protein 5.6 (L) 6.4 - 8.2 g/dL    Albumin 2.2 (L) 3.5 - 5.0 g/dL    Globulin 3.4 2.0 - 4.0 g/dL    Albumin/Globulin Ratio 0.6 (L) 1.1 - 2.2     CBC with Auto Differential    Collection Time: 06/08/25  3:48 AM   Result Value Ref Range    WBC 6.2 4.1 - 11.1 K/uL    RBC 3.49 (L) 4.10 - 5.70 M/uL    Hemoglobin 10.4 (L) 12.1 - 17.0 g/dL    Hematocrit 32.0 (L) 36.6 - 50.3 %    MCV 91.7 80.0 - 99.0 FL    MCH 29.8 26.0 - 34.0 PG    MCHC 32.5 30.0 - 36.5 g/dL    RDW 14.6 (H) 11.5 - 14.5 %    Platelets 181 150 - 400 K/uL    MPV 11.4 8.9 - 12.9 FL    Nucleated RBCs 0.3 (H) 0  WBC    nRBC 0.02 (H) 0.00 - 0.01 K/uL    Neutrophils % 71 32.0 - 75.0 %    Band Neutrophils 18 %    Lymphocytes % 4 (L) 12.0 - 49.0 %    Monocytes % 6 5.0 - 13.0 %    Eosinophils % 0 0.0 - 7.0 %    Basophils % 0 0.0 - 1.0 %    Metamyelocytes 1 %    Immature Granulocytes % 0 0.0 - 0.5 %    Neutrophils Absolute 5.52 1.80 - 8.00 K/UL     Lymphocytes Absolute 0.25 (L) 0.80 - 3.50 K/UL    Monocytes Absolute 0.37 0.00 - 1.00 K/UL    Eosinophils Absolute 0.00 0.00 - 0.40 K/UL    Basophils Absolute 0.00 0.00 - 0.10 K/UL    Immature Granulocytes Absolute 0.00 0.00 - 0.04 K/UL    Differential Type MANUAL      RBC Comment NORMOCYTIC, NORMOCHROMIC

## 2025-06-09 NOTE — PLAN OF CARE
Problem: Physical Therapy - Adult  Goal: By Discharge: Performs mobility at highest level of function for planned discharge setting.  See evaluation for individualized goals.  Description: FUNCTIONAL STATUS PRIOR TO ADMISSION: Patient was modified independent using a rolling walker for functional mobility, though initially reported does not use DME.     HOME SUPPORT PRIOR TO ADMISSION: The patient lived alone.    Physical Therapy Goals  Initiated 6/3/2025  1.  Patient will move from supine to sit and sit to supine, scoot up and down, and roll side to side in bed with modified independence within 7 day(s).    2.  Patient will perform sit to stand with modified independence within 7 day(s).  3.  Patient will transfer from bed to chair and chair to bed with modified independence using the least restrictive device within 7 day(s).  4.  Patient will ambulate with modified independence for 150 feet with the least restrictive device within 7 day(s).   5.  Patient will ascend/descend 3 stairs with 1 handrail(s) with modified independence within 7 day(s).   Outcome: Not Progressing   PHYSICAL THERAPY TREATMENT    Patient: Francisca Norris (84 y.o. male)  Date: 6/9/2025  Diagnosis: Metastatic cancer (HCC) [C79.9]  Acute hypoxic respiratory failure (HCC) [J96.01] Acute hypoxic respiratory failure (HCC)  Procedure(s) (LRB):  LEFT SUBCLAVIAN VEIN PORT INSERTION (Left) 3 Days Post-Op  Precautions: Restrictions/Precautions  Restrictions/Precautions: Fall Risk, Bed Alarm            ASSESSMENT:  Patient continues to benefit from skilled PT services and is not progressing towards goals. Pt showing poor tolerance this session. His breathing is very labored, he is on 4L. His vitals however were stable, see below. He is needing increased assist for all mobility. Mod A of 2 to get to the EOB, min A of 2 to stand, mod to max A of 2 to turn with the RW to the chair. Safety compromised with pt attempted to sit prior to completing turn. Pt

## 2025-06-09 NOTE — PLAN OF CARE
Problem: Occupational Therapy - Adult  Goal: By Discharge: Performs self-care activities at highest level of function for planned discharge setting.  See evaluation for individualized goals.  Description: FUNCTIONAL STATUS PRIOR TO ADMISSION:  pt stated independence w/ ADLs and mod I for mobility w/ RW   , Prior Level of Assist for ADLs: Independent,  ,  ,  ,  ,  , Prior Level of Assist for Homemaking: Independent,  , Prior Level of Assist for Transfers: Independent, Active : Yes     HOME SUPPORT: pt lived alone     Occupational Therapy Goals:  Initiated 6/3/2025  1.  Patient will perform upper body dressing with [place] within 7 day(s).  2.  Patient will perform grooming with Modified [place] within 7 day(s).  3.  Patient will perform lower body dressing with Modified [place] within 7 day(s).  4.  Patient will perform toilet transfers with Modified [place]  within 7 day(s).  5.  Patient will perform all aspects of toileting with [place] within 7 day(s).  6.  Patient will participate in upper extremity therapeutic exercise/activities with Supervision for 7 minutes within 7 day(s).    7.  Patient will utilize energy conservation techniques during functional activities with verbal cues within 7 day(s).     Outcome: Not Progressing    OCCUPATIONAL THERAPY TREATMENT  Patient: Francisca Norris (84 y.o. male)  Date: 6/9/2025  Primary Diagnosis: Metastatic cancer (HCC) [C79.9]  Acute hypoxic respiratory failure (HCC) [J96.01]  Procedure(s) (LRB):  LEFT SUBCLAVIAN VEIN PORT INSERTION (Left) 3 Days Post-Op   Precautions: Fall Risk, Bed Alarm                Chart, occupational therapy assessment, plan of care, and goals were reviewed.    ASSESSMENT  Patient continues to benefit from skilled OT services and is not progressing towards goals. Patient ADLs continue to be limited by impaired balance, generalized weakness, limited lower body access, decreased functional activity tolerance, and

## 2025-06-09 NOTE — PROGRESS NOTES
Hospitalist Progress Note    NAME:   Francisca Norris   : 1940   MRN: [id number]     Date/Time: 2025 6:30 PM  Patient PCP: Rebekah Puri APRN - NP    Estimated discharge date:  Barriers:       Assessment / Plan:  Acute hypoxic respiratory failure POA requiring oxygen supplementation  Metastatic lung cancer with mets to the liver  Biopsy report consistent with small cell lung cancer  R hilar mass with compression of R pulmonary artery POA-     - , does not appear overloaded     CTA chest: 1. No CT evidence for pulmonary embolus at this time. Extrinsic compression of the right pulmonary arterial system and the right pulmonary veins by a right hilar mass.  2. Right hilar mass or clustered lymph nodes.  3. Mediastinal lymphadenopathy.  4. Incidental upper abdominal findings previously described.     : Patient was not oriented to time place and person, was on 2 L of oxygen the MAP was on the low side.  500 mL of IV fluid given.  Nuclear med bone scan ordered.  Will follow-up on the liver biopsy result.   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  Oncology has a family meeting today family need to choose between starting the chemotherapy while inpatient versus hospice.  NM bone scan with no significant findings  On : S/p left subclavian vein port insertion by general surgery.  As per oncology-patient would like to pursue 1 cycle of carbo etoposide.  Plan to proceed on  after port placement.  Patient will continue to stay till  for final dose of etoposide.  Discussed with CM plan to transition to SNF on  after completion of chemotherapy.  Patient is considering transition to hospice based on how he tolerates cycle 1.  : Tolerating chemotherapy.  Patient denies acute distress, currently on 4 L of oxygen  : Finished chemotherapy today, on 4 L of oxygen.  No acute distress.  Discussed with daughter in person.  Will need oncologist clearance tomorrow before

## 2025-06-09 NOTE — CONSULTS
Comprehensive Nutrition Assessment    Type and Reason for Visit:  Reassess    Nutrition Recommendations/Plan:   Continue current diet  Encourage PO intakes, honor preferences as able, provide assistance PRN  Magic cup 2x/day (dislikes ensure)  Monitor and record PO intakes, supplement acceptance, and Bms in I/Os     Malnutrition Assessment:  Malnutrition Status:  Moderate malnutrition (06/09/25 1440)    Context:  Acute Illness     Findings of the 6 clinical characteristics of malnutrition:  Energy Intake:  50% or less of estimated energy requirements for 5 or more days  Weight Loss:  No weight loss     Body Fat Loss:  No body fat loss     Muscle Mass Loss:  Mild muscle mass loss Temples (temporalis), Clavicles (pectoralis & deltoids)  Fluid Accumulation:  No fluid accumulation     Strength:  Not Performed    Nutrition Assessment:    Follow up. Consulted for poor PO intake. Discussed with patient and family at bedside. Pt working on lunch during afternoon interview, reports poor intakes. Noted x4 ensure at bedside - pt reports disliking, discontinued. He is agreeable to trying magic cup (chocolate), ordered. Provided menu and kitchen extension, encouraged to utilize alternate menu PRN.    Nutrition Related Findings:    Labs: Na 135, K 5.2, BNU 80, Creat 1.48, Gluc 108. Meds: atorvastatin, hydrochlorothiazide, lactated ringers. No edema. BM 6/8. Wound Type: Surgical Incision       Current Nutrition Intake & Therapies:    Average Meal Intake: 1-25%  Average Supplements Intake: 0%  ADULT DIET; Regular  ADULT ORAL NUTRITION SUPPLEMENT; Lunch, Dinner; Frozen Oral Supplement    Anthropometric Measures:  Height: 175.3 cm (5' 9.02\")  Ideal Body Weight (IBW): 160 lbs (73 kg)    Current Body Weight: 75 kg (165 lb 5.5 oz), 103.3 % IBW. Weight Source: Not specified  Current BMI (kg/m2): 24.4  BMI Categories: Normal Weight (BMI 22.0 to 24.9) age over 65    Estimated Daily Nutrient Needs:  Energy Requirements Based On:

## 2025-06-10 ENCOUNTER — APPOINTMENT (OUTPATIENT)
Facility: HOSPITAL | Age: 85
DRG: 180 | End: 2025-06-10
Attending: STUDENT IN AN ORGANIZED HEALTH CARE EDUCATION/TRAINING PROGRAM
Payer: MEDICARE

## 2025-06-10 LAB
ALBUMIN SERPL-MCNC: 2.1 G/DL (ref 3.5–5)
ALBUMIN/GLOB SERPL: 0.7 (ref 1.1–2.2)
ALP SERPL-CCNC: 256 U/L (ref 45–117)
ALT SERPL-CCNC: 120 U/L (ref 12–78)
ANION GAP SERPL CALC-SCNC: 3 MMOL/L (ref 2–12)
APPEARANCE UR: ABNORMAL
AST SERPL-CCNC: 260 U/L (ref 15–37)
BACTERIA URNS QL MICRO: NEGATIVE /HPF
BASOPHILS # BLD: 0 K/UL (ref 0–0.1)
BASOPHILS NFR BLD: 0 % (ref 0–1)
BILIRUB SERPL-MCNC: 0.5 MG/DL (ref 0.2–1)
BILIRUB UR QL: NEGATIVE
BUN SERPL-MCNC: 92 MG/DL (ref 6–20)
BUN/CREAT SERPL: 54 (ref 12–20)
CALCIUM SERPL-MCNC: 9 MG/DL (ref 8.5–10.1)
CHLORIDE SERPL-SCNC: 113 MMOL/L (ref 97–108)
CO2 SERPL-SCNC: 22 MMOL/L (ref 21–32)
COLOR UR: YELLOW
CREAT SERPL-MCNC: 1.71 MG/DL (ref 0.7–1.3)
DIFFERENTIAL METHOD BLD: ABNORMAL
EOSINOPHIL # BLD: 0 K/UL (ref 0–0.4)
EOSINOPHIL NFR BLD: 0 % (ref 0–7)
EPITH CASTS URNS QL MICRO: ABNORMAL /LPF
ERYTHROCYTE [DISTWIDTH] IN BLOOD BY AUTOMATED COUNT: 14.9 % (ref 11.5–14.5)
FOLATE SERPL-MCNC: 13.1 NG/ML (ref 5–21)
GLOBULIN SER CALC-MCNC: 3.1 G/DL (ref 2–4)
GLUCOSE SERPL-MCNC: 96 MG/DL (ref 65–100)
GLUCOSE UR STRIP.AUTO-MCNC: NEGATIVE MG/DL
HCT VFR BLD AUTO: 26.9 % (ref 36.6–50.3)
HGB BLD-MCNC: 8.8 G/DL (ref 12.1–17)
HGB UR QL STRIP: ABNORMAL
HYALINE CASTS URNS QL MICRO: ABNORMAL /LPF (ref 0–2)
IMM GRANULOCYTES # BLD AUTO: 0 K/UL (ref 0–0.04)
IMM GRANULOCYTES NFR BLD AUTO: 0 % (ref 0–0.5)
KETONES UR QL STRIP.AUTO: NEGATIVE MG/DL
LEUKOCYTE ESTERASE UR QL STRIP.AUTO: ABNORMAL
LYMPHOCYTES # BLD: 0.32 K/UL (ref 0.8–3.5)
LYMPHOCYTES NFR BLD: 14 % (ref 12–49)
MCH RBC QN AUTO: 30 PG (ref 26–34)
MCHC RBC AUTO-ENTMCNC: 32.7 G/DL (ref 30–36.5)
MCV RBC AUTO: 91.8 FL (ref 80–99)
MONOCYTES # BLD: 0 K/UL (ref 0–1)
MONOCYTES NFR BLD: 0 % (ref 5–13)
NEUTS SEG # BLD: 1.98 K/UL (ref 1.8–8)
NEUTS SEG NFR BLD: 86 % (ref 32–75)
NITRITE UR QL STRIP.AUTO: NEGATIVE
NRBC # BLD: 0 K/UL (ref 0–0.01)
NRBC BLD-RTO: 0 PER 100 WBC
PH UR STRIP: 5.5 (ref 5–8)
PLATELET # BLD AUTO: 32 K/UL (ref 150–400)
PMV BLD AUTO: 12.5 FL (ref 8.9–12.9)
POTASSIUM SERPL-SCNC: 4.7 MMOL/L (ref 3.5–5.1)
PROT SERPL-MCNC: 5.2 G/DL (ref 6.4–8.2)
PROT UR STRIP-MCNC: 100 MG/DL
RBC # BLD AUTO: 2.93 M/UL (ref 4.1–5.7)
RBC #/AREA URNS HPF: >100 /HPF (ref 0–5)
RBC MORPH BLD: ABNORMAL
SODIUM SERPL-SCNC: 138 MMOL/L (ref 136–145)
SP GR UR REFRACTOMETRY: 1.01
T4 FREE SERPL-MCNC: 1.4 NG/DL (ref 0.8–1.5)
TOTAL CELLS COUNTED SPEC: 50
TSH SERPL DL<=0.05 MIU/L-ACNC: 0.5 UIU/ML (ref 0.36–3.74)
URINE CULTURE IF INDICATED: ABNORMAL
UROBILINOGEN UR QL STRIP.AUTO: 0.2 EU/DL (ref 0.2–1)
VIT B12 SERPL-MCNC: 1665 PG/ML (ref 193–986)
WBC # BLD AUTO: 2.3 K/UL (ref 4.1–11.1)
WBC URNS QL MICRO: ABNORMAL /HPF (ref 0–4)

## 2025-06-10 PROCEDURE — 70450 CT HEAD/BRAIN W/O DYE: CPT

## 2025-06-10 PROCEDURE — 6370000000 HC RX 637 (ALT 250 FOR IP): Performed by: SURGERY

## 2025-06-10 PROCEDURE — 99232 SBSQ HOSP IP/OBS MODERATE 35: CPT | Performed by: INTERNAL MEDICINE

## 2025-06-10 PROCEDURE — 71045 X-RAY EXAM CHEST 1 VIEW: CPT

## 2025-06-10 PROCEDURE — 51798 US URINE CAPACITY MEASURE: CPT

## 2025-06-10 PROCEDURE — 36415 COLL VENOUS BLD VENIPUNCTURE: CPT

## 2025-06-10 PROCEDURE — 81001 URINALYSIS AUTO W/SCOPE: CPT

## 2025-06-10 PROCEDURE — 87040 BLOOD CULTURE FOR BACTERIA: CPT

## 2025-06-10 PROCEDURE — 2700000000 HC OXYGEN THERAPY PER DAY

## 2025-06-10 PROCEDURE — 97535 SELF CARE MNGMENT TRAINING: CPT

## 2025-06-10 PROCEDURE — 6360000002 HC RX W HCPCS: Performed by: INTERNAL MEDICINE

## 2025-06-10 PROCEDURE — 82607 VITAMIN B-12: CPT

## 2025-06-10 PROCEDURE — 1100000000 HC RM PRIVATE

## 2025-06-10 PROCEDURE — 84439 ASSAY OF FREE THYROXINE: CPT

## 2025-06-10 PROCEDURE — 87086 URINE CULTURE/COLONY COUNT: CPT

## 2025-06-10 PROCEDURE — 94760 N-INVAS EAR/PLS OXIMETRY 1: CPT

## 2025-06-10 PROCEDURE — 82746 ASSAY OF FOLIC ACID SERUM: CPT

## 2025-06-10 PROCEDURE — 84443 ASSAY THYROID STIM HORMONE: CPT

## 2025-06-10 PROCEDURE — 2580000003 HC RX 258: Performed by: SURGERY

## 2025-06-10 PROCEDURE — 6360000002 HC RX W HCPCS: Performed by: STUDENT IN AN ORGANIZED HEALTH CARE EDUCATION/TRAINING PROGRAM

## 2025-06-10 PROCEDURE — 2500000003 HC RX 250 WO HCPCS: Performed by: SURGERY

## 2025-06-10 PROCEDURE — 85025 COMPLETE CBC W/AUTO DIFF WBC: CPT

## 2025-06-10 PROCEDURE — 80053 COMPREHEN METABOLIC PANEL: CPT

## 2025-06-10 RX ORDER — FUROSEMIDE 10 MG/ML
20 INJECTION INTRAMUSCULAR; INTRAVENOUS ONCE
Status: COMPLETED | OUTPATIENT
Start: 2025-06-10 | End: 2025-06-10

## 2025-06-10 RX ADMIN — SODIUM CHLORIDE, SODIUM LACTATE, POTASSIUM CHLORIDE, AND CALCIUM CHLORIDE: .6; .31; .03; .02 INJECTION, SOLUTION INTRAVENOUS at 01:26

## 2025-06-10 RX ADMIN — SODIUM CHLORIDE, SODIUM LACTATE, POTASSIUM CHLORIDE, AND CALCIUM CHLORIDE: .6; .31; .03; .02 INJECTION, SOLUTION INTRAVENOUS at 12:05

## 2025-06-10 RX ADMIN — SODIUM CHLORIDE, PRESERVATIVE FREE 10 ML: 5 INJECTION INTRAVENOUS at 10:15

## 2025-06-10 RX ADMIN — METOPROLOL SUCCINATE 25 MG: 25 TABLET, EXTENDED RELEASE ORAL at 11:17

## 2025-06-10 RX ADMIN — FILGRASTIM-AAFI 480 MCG: 480 INJECTION, SOLUTION SUBCUTANEOUS at 14:47

## 2025-06-10 RX ADMIN — FUROSEMIDE 20 MG: 10 INJECTION, SOLUTION INTRAMUSCULAR; INTRAVENOUS at 16:50

## 2025-06-10 RX ADMIN — SODIUM CHLORIDE, PRESERVATIVE FREE 10 ML: 5 INJECTION INTRAVENOUS at 21:27

## 2025-06-10 RX ADMIN — TAMSULOSIN HYDROCHLORIDE 0.4 MG: 0.4 CAPSULE ORAL at 11:16

## 2025-06-10 ASSESSMENT — PAIN SCALES - GENERAL
PAINLEVEL_OUTOF10: 0
PAINLEVEL_OUTOF10: 0

## 2025-06-10 NOTE — PLAN OF CARE
Problem: Occupational Therapy - Adult  Goal: By Discharge: Performs self-care activities at highest level of function for planned discharge setting.  See evaluation for individualized goals.  Description: FUNCTIONAL STATUS PRIOR TO ADMISSION:  pt stated independence w/ ADLs and mod I for mobility w/ RW   , Prior Level of Assist for ADLs: Independent,  ,  ,  ,  ,  , Prior Level of Assist for Homemaking: Independent,  , Prior Level of Assist for Transfers: Independent, Active : Yes     HOME SUPPORT: pt lived alone     Occupational Therapy Goals:  Weekly Reassessment 6/10/25 goals updated   1.  Patient will perform upper body dressing with minimal assistance within 7 day(s).  2.  Patient will perform grooming with stand by assistance within 7 day(s).  3.  Patient will perform lower body dressing with moderate assistance within 7 day(s).  4.  Patient will perform toilet transfers with stand by assistance  within 7 day(s).  5.  Patient will perform all aspects of toileting with minimal assistance within 7 day(s).  6.  Patient will participate in upper extremity therapeutic exercise/activities with Supervision for 7 minutes within 7 day(s).    7.  Patient will utilize energy conservation techniques during functional activities with verbal cues within 7 day(s).     Initiated 6/3/2025  1.  Patient will perform upper body dressing with [place] within 7 day(s).  2.  Patient will perform grooming with Modified [place] within 7 day(s).  3.  Patient will perform lower body dressing with Modified [place] within 7 day(s).  4.  Patient will perform toilet transfers with Modified [place]  within 7 day(s).  5.  Patient will perform all aspects of toileting with [place] within 7 day(s).  6.  Patient will participate in upper extremity therapeutic exercise/activities with Supervision for 7 minutes within 7 day(s).    7.  Patient will utilize energy conservation techniques during functional activities

## 2025-06-10 NOTE — PROGRESS NOTES
Hospitalist Progress Note    NAME:   Francisca Norris   : 1940   MRN: [id number]     Date/Time: 6/10/2025 2:30 PM  Patient PCP: Rebekah Puri APRN - NP    Estimated discharge date:  Barriers:       Assessment / Plan:  Acute hypoxic respiratory failure POA requiring oxygen supplementation  Metastatic lung cancer with mets to the liver  Biopsy report consistent with small cell lung cancer  R hilar mass with compression of R pulmonary artery POA-   Altered mental status, improving    - , does not appear overloaded     CTA chest: 1. No CT evidence for pulmonary embolus at this time. Extrinsic compression of the right pulmonary arterial system and the right pulmonary veins by a right hilar mass.  2. Right hilar mass or clustered lymph nodes.  3. Mediastinal lymphadenopathy.  4. Incidental upper abdominal findings previously described.     : Patient was not oriented to time place and person, was on 2 L of oxygen the MAP was on the low side.  500 mL of IV fluid given.  Nuclear med bone scan ordered.  Will follow-up on the liver biopsy result.   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  Oncology has a family meeting today family need to choose between starting the chemotherapy while inpatient versus hospice.  NM bone scan with no significant findings  On : S/p left subclavian vein port insertion by general surgery.  As per oncology-patient would like to pursue 1 cycle of carbo etoposide.  Plan to proceed on  after port placement.  Patient will continue to stay till  for final dose of etoposide.  Discussed with CM plan to transition to SNF on  after completion of chemotherapy.  Patient is considering transition to hospice based on how he tolerates cycle 1.  : Tolerating chemotherapy.  Patient denies acute distress, currently on 4 L of oxygen  : Finished chemotherapy today, on 4 L of oxygen.  No acute distress.  Discussed with daughter in person.  Will need

## 2025-06-10 NOTE — PLAN OF CARE
Problem: Occupational Therapy - Adult  Goal: By Discharge: Performs self-care activities at highest level of function for planned discharge setting.  See evaluation for individualized goals.  Description: FUNCTIONAL STATUS PRIOR TO ADMISSION:  pt stated independence w/ ADLs and mod I for mobility w/ RW   , Prior Level of Assist for ADLs: Independent,  ,  ,  ,  ,  , Prior Level of Assist for Homemaking: Independent,  , Prior Level of Assist for Transfers: Independent, Active : Yes     HOME SUPPORT: pt lived alone     Occupational Therapy Goals:  Initiated 6/3/2025  1.  Patient will perform upper body dressing with [place] within 7 day(s).  2.  Patient will perform grooming with Modified [place] within 7 day(s).  3.  Patient will perform lower body dressing with Modified [place] within 7 day(s).  4.  Patient will perform toilet transfers with Modified [place]  within 7 day(s).  5.  Patient will perform all aspects of toileting with [place] within 7 day(s).  6.  Patient will participate in upper extremity therapeutic exercise/activities with Supervision for 7 minutes within 7 day(s).    7.  Patient will utilize energy conservation techniques during functional activities with verbal cues within 7 day(s).   6/9/2025 1023 by Yaneth Weller OT  Outcome: Not Progressing     Problem: Physical Therapy - Adult  Goal: By Discharge: Performs mobility at highest level of function for planned discharge setting.  See evaluation for individualized goals.  Description: FUNCTIONAL STATUS PRIOR TO ADMISSION: Patient was modified independent using a rolling walker for functional mobility, though initially reported does not use DME.     HOME SUPPORT PRIOR TO ADMISSION: The patient lived alone.    Physical Therapy Goals  Initiated 6/3/2025  1.  Patient will move from supine to sit and sit to supine, scoot up and down, and roll side to side in bed with modified independence within 7 day(s).    2.

## 2025-06-10 NOTE — PROGRESS NOTES
Pulmonary Progress Note    C/c: acute hypoxic respiratory failure, lung cancer     Assessment     Acute hypoxic respiratory failure  Extensive stage small cell carcinoma lung  Liver mets from #2  Mediastinal lymphadenopathy, presumed mets  Acute kidney injury  Cirrhosis  New pancytopenia in setting of chemotherapy      Management plan    Patient is requiring 4L supplemental oxygen. Titrate to keep O2 sats above 92%  CTA of chest showed a right hilar mass that is compressing the right pulm artery and mediastinal lymphadenopathy  Liver biopsy showed small cell carcinoma  Has had first dose of chemo last weekend with carbo etoposide  Drop in blood counts in setting of chemo and no evidence of bleeding.     Spoke with the primary team.  Apparently sepsis workup was ordered due to intermittent confusion.      Medical Decision Making : High complexity problems, high amount data reviewed, moderate risk management          Hospital interval:    6/4 -patient lying comfortably in bed.  No events overnight.  Discussed plan with patient regarding bronchoscopy/EBUS.   6/5 -patient sitting comfortably in bed on 2 L supplemental oxygen, no distress.  6/6  -patient sitting up in bed with family at bedside, just got back from port placement.  6/7 - Lying comfortably in bed, about to start chemo. On 4L supplemental oxygen  6/8 - Sitting in bed resting quietly, no distress. Remains on 4L supplemental oxygen  6/9 -patient looking weak and lethargic.  On O2 4LPM  6/10- intermittent confusion, on O2 4LPM, denies shortness of breath or abdominal pain.        HPI: Francisca Norris is a 84 y.o.  male with PMHx significant for CAD, hypercholesterolemia, HTN, sciatica who presented to [facility] ([place], [place]) for complaints of weakness, back and leg pain. He complained he has had this pain for 2-3 weeks and is concerned about falling. CT imaging of the abdomen was done to rule out PE due to SOB requiring 2L nasal cannula. Imaging showed lung lesion with

## 2025-06-10 NOTE — PROGRESS NOTES
End of Shift Note    Bedside shift change report given to MARIA TERESA Bran (oncoming nurse) by Stephanie Cash RN (offgoing nurse).  Report included the following information SBAR, Kardex, Intake/Output, MAR, Recent Results, and Cardiac Rhythm Sinus rhythm    Shift worked:  1330 to 1930     Shift summary and any significant changes:     Report received from ZAY Covington at 1330. Assessment completed. 1 dose PRN IV Dilaudid administered for lower back pain, see MAR. Denied nausea. Patient 2 assist with walker from chair to bed.    Orozco removed at 1345 per MD orders. Bladder scan at 1631 showed 40 ml retained after urine occurrence. Malewick placed and urine output documented. Repeat bladder scan at 1719 showed 39 ml retained.     IV flushed and patent; port care provided and flushed with positive blood return. Labs drawn per orders. Patient assisted with lunch and dinner with poor appetite. Patient's daughters came to visit and were provided with an update per RN and MD. Bed alarm engaged. Nursing rounds and education completed.     Concerns for physician to address:  Possible discharge tomorrow, 6/10, to [facility] pending Oncology clearance.     Zone phone for oncoming shift:   [phone]       Activity:  Level of Assistance: Maximum assist, patient does 25-49%  Number times ambulated in hallways past shift: 0  Number of times OOB to chair past shift: 1    Cardiac:   Cardiac Monitoring: Yes      Cardiac Rhythm: Sinus rhythm    Access:  Current line(s): PIV  and port accessed    Genitourinary:   Urinary Status: External catheter, Voiding    Respiratory:   O2 Device: Nasal cannula  Chronic home O2 use?: NO  Incentive spirometer at bedside: NO    GI:  Last BM (including prior to admit): 06/08/25 (per pt)  Current diet:  ADULT DIET; Regular  ADULT ORAL NUTRITION SUPPLEMENT; Lunch, Dinner; Frozen Oral Supplement  Passing flatus: YES    Pain Management:   Patient states pain is manageable on current regimen:

## 2025-06-10 NOTE — PROGRESS NOTES
Physical Therapy    Arrived to see pt for session. Spoke with RN. Pt had just gotten back to bed and is resting. Pt had been out in chair since OT session and was very fatigued. Will defer at this time and follow up tomorrow.    Neisha Garcia, PT

## 2025-06-11 PROBLEM — D61.818 PANCYTOPENIA (HCC): Status: ACTIVE | Noted: 2025-06-11

## 2025-06-11 LAB
ALBUMIN SERPL-MCNC: 2 G/DL (ref 3.5–5)
ALBUMIN/GLOB SERPL: 0.7 (ref 1.1–2.2)
ALP SERPL-CCNC: 215 U/L (ref 45–117)
ALT SERPL-CCNC: 95 U/L (ref 12–78)
ANION GAP SERPL CALC-SCNC: 7 MMOL/L (ref 2–12)
AST SERPL-CCNC: 236 U/L (ref 15–37)
BACTERIA SPEC CULT: ABNORMAL
BASOPHILS # BLD: 0 K/UL (ref 0–0.1)
BASOPHILS NFR BLD: 0 % (ref 0–1)
BILIRUB SERPL-MCNC: 0.6 MG/DL (ref 0.2–1)
BUN SERPL-MCNC: 106 MG/DL (ref 6–20)
BUN/CREAT SERPL: 43 (ref 12–20)
CALCIUM SERPL-MCNC: 8.6 MG/DL (ref 8.5–10.1)
CC UR VC: ABNORMAL
CHLORIDE SERPL-SCNC: 112 MMOL/L (ref 97–108)
CO2 SERPL-SCNC: 19 MMOL/L (ref 21–32)
CREAT SERPL-MCNC: 2.47 MG/DL (ref 0.7–1.3)
DIFFERENTIAL METHOD BLD: ABNORMAL
EOSINOPHIL # BLD: 0 K/UL (ref 0–0.4)
EOSINOPHIL NFR BLD: 0 % (ref 0–7)
ERYTHROCYTE [DISTWIDTH] IN BLOOD BY AUTOMATED COUNT: 14.6 % (ref 11.5–14.5)
ERYTHROCYTE [DISTWIDTH] IN BLOOD BY AUTOMATED COUNT: 14.8 % (ref 11.5–14.5)
GLOBULIN SER CALC-MCNC: 2.9 G/DL (ref 2–4)
GLUCOSE SERPL-MCNC: 93 MG/DL (ref 65–100)
HCT VFR BLD AUTO: 23.5 % (ref 36.6–50.3)
HCT VFR BLD AUTO: 24.2 % (ref 36.6–50.3)
HGB BLD-MCNC: 7.6 G/DL (ref 12.1–17)
HGB BLD-MCNC: 7.9 G/DL (ref 12.1–17)
IMM GRANULOCYTES # BLD AUTO: 0 K/UL (ref 0–0.04)
IMM GRANULOCYTES NFR BLD AUTO: 0 % (ref 0–0.5)
LYMPHOCYTES # BLD: 0.22 K/UL (ref 0.8–3.5)
LYMPHOCYTES NFR BLD: 8 % (ref 12–49)
MAGNESIUM SERPL-MCNC: 2.5 MG/DL (ref 1.6–2.4)
MCH RBC QN AUTO: 29.2 PG (ref 26–34)
MCH RBC QN AUTO: 29.5 PG (ref 26–34)
MCHC RBC AUTO-ENTMCNC: 32.3 G/DL (ref 30–36.5)
MCHC RBC AUTO-ENTMCNC: 32.6 G/DL (ref 30–36.5)
MCV RBC AUTO: 90.3 FL (ref 80–99)
MCV RBC AUTO: 90.4 FL (ref 80–99)
MONOCYTES # BLD: 0 K/UL (ref 0–1)
MONOCYTES NFR BLD: 0 % (ref 5–13)
NEUTS BAND NFR BLD MANUAL: 3 %
NEUTS SEG # BLD: 2.48 K/UL (ref 1.8–8)
NEUTS SEG NFR BLD: 89 % (ref 32–75)
NRBC # BLD: 0 K/UL (ref 0–0.01)
NRBC # BLD: 0 K/UL (ref 0–0.01)
NRBC BLD-RTO: 0 PER 100 WBC
NRBC BLD-RTO: 0 PER 100 WBC
PHOSPHATE SERPL-MCNC: 9.2 MG/DL (ref 2.6–4.7)
PLATELET # BLD AUTO: 6 K/UL (ref 150–400)
PLATELET # BLD AUTO: 9 K/UL (ref 150–400)
PLATELET COMMENT: ABNORMAL
PMV BLD AUTO: 11.4 FL (ref 8.9–12.9)
POTASSIUM SERPL-SCNC: 5 MMOL/L (ref 3.5–5.1)
PROCALCITONIN SERPL-MCNC: 3.73 NG/ML
PROT SERPL-MCNC: 4.9 G/DL (ref 6.4–8.2)
RBC # BLD AUTO: 2.6 M/UL (ref 4.1–5.7)
RBC # BLD AUTO: 2.68 M/UL (ref 4.1–5.7)
RBC MORPH BLD: ABNORMAL
SERVICE CMNT-IMP: ABNORMAL
SODIUM SERPL-SCNC: 138 MMOL/L (ref 136–145)
WBC # BLD AUTO: 2.4 K/UL (ref 4.1–11.1)
WBC # BLD AUTO: 2.7 K/UL (ref 4.1–11.1)

## 2025-06-11 PROCEDURE — 86901 BLOOD TYPING SEROLOGIC RH(D): CPT

## 2025-06-11 PROCEDURE — 85025 COMPLETE CBC W/AUTO DIFF WBC: CPT

## 2025-06-11 PROCEDURE — 6360000002 HC RX W HCPCS: Performed by: INTERNAL MEDICINE

## 2025-06-11 PROCEDURE — 85027 COMPLETE CBC AUTOMATED: CPT

## 2025-06-11 PROCEDURE — 1100000000 HC RM PRIVATE

## 2025-06-11 PROCEDURE — 86850 RBC ANTIBODY SCREEN: CPT

## 2025-06-11 PROCEDURE — 2700000000 HC OXYGEN THERAPY PER DAY

## 2025-06-11 PROCEDURE — 94760 N-INVAS EAR/PLS OXIMETRY 1: CPT

## 2025-06-11 PROCEDURE — 94640 AIRWAY INHALATION TREATMENT: CPT

## 2025-06-11 PROCEDURE — 84145 PROCALCITONIN (PCT): CPT

## 2025-06-11 PROCEDURE — 2500000003 HC RX 250 WO HCPCS: Performed by: SURGERY

## 2025-06-11 PROCEDURE — 2580000003 HC RX 258: Performed by: INTERNAL MEDICINE

## 2025-06-11 PROCEDURE — 6370000000 HC RX 637 (ALT 250 FOR IP)

## 2025-06-11 PROCEDURE — 36415 COLL VENOUS BLD VENIPUNCTURE: CPT

## 2025-06-11 PROCEDURE — 86900 BLOOD TYPING SEROLOGIC ABO: CPT

## 2025-06-11 PROCEDURE — 80053 COMPREHEN METABOLIC PANEL: CPT

## 2025-06-11 PROCEDURE — 86923 COMPATIBILITY TEST ELECTRIC: CPT

## 2025-06-11 PROCEDURE — 83735 ASSAY OF MAGNESIUM: CPT

## 2025-06-11 PROCEDURE — 6370000000 HC RX 637 (ALT 250 FOR IP): Performed by: SURGERY

## 2025-06-11 PROCEDURE — 36430 TRANSFUSION BLD/BLD COMPNT: CPT

## 2025-06-11 PROCEDURE — P9073 PLATELETS PHERESIS PATH REDU: HCPCS

## 2025-06-11 PROCEDURE — 6370000000 HC RX 637 (ALT 250 FOR IP): Performed by: INTERNAL MEDICINE

## 2025-06-11 PROCEDURE — 84100 ASSAY OF PHOSPHORUS: CPT

## 2025-06-11 RX ORDER — CASTOR OIL AND BALSAM, PERU 788; 87 MG/G; MG/G
OINTMENT TOPICAL 2 TIMES DAILY
Status: DISCONTINUED | OUTPATIENT
Start: 2025-06-11 | End: 2025-06-11

## 2025-06-11 RX ORDER — ALBUTEROL SULFATE 0.83 MG/ML
2.5 SOLUTION RESPIRATORY (INHALATION)
Status: DISCONTINUED | OUTPATIENT
Start: 2025-06-11 | End: 2025-06-15 | Stop reason: HOSPADM

## 2025-06-11 RX ORDER — METRONIDAZOLE 500 MG/100ML
500 INJECTION, SOLUTION INTRAVENOUS EVERY 8 HOURS
Status: DISCONTINUED | OUTPATIENT
Start: 2025-06-11 | End: 2025-06-13

## 2025-06-11 RX ORDER — GUAIFENESIN 600 MG/1
600 TABLET, EXTENDED RELEASE ORAL 2 TIMES DAILY
Status: DISCONTINUED | OUTPATIENT
Start: 2025-06-11 | End: 2025-06-13

## 2025-06-11 RX ORDER — CASTOR OIL AND BALSAM, PERU 788; 87 MG/G; MG/G
OINTMENT TOPICAL 3 TIMES DAILY
Status: DISCONTINUED | OUTPATIENT
Start: 2025-06-11 | End: 2025-06-15 | Stop reason: HOSPADM

## 2025-06-11 RX ORDER — LEVOFLOXACIN 5 MG/ML
750 INJECTION, SOLUTION INTRAVENOUS
Status: DISCONTINUED | OUTPATIENT
Start: 2025-06-11 | End: 2025-06-13

## 2025-06-11 RX ORDER — OXYCODONE HYDROCHLORIDE 5 MG/1
5 TABLET ORAL EVERY 4 HOURS PRN
Refills: 0 | Status: DISCONTINUED | OUTPATIENT
Start: 2025-06-11 | End: 2025-06-15 | Stop reason: HOSPADM

## 2025-06-11 RX ORDER — HYDROXYZINE HYDROCHLORIDE 25 MG/1
25 TABLET, FILM COATED ORAL 3 TIMES DAILY
Status: DISCONTINUED | OUTPATIENT
Start: 2025-06-11 | End: 2025-06-13

## 2025-06-11 RX ORDER — SODIUM CHLORIDE 9 MG/ML
INJECTION, SOLUTION INTRAVENOUS PRN
Status: DISCONTINUED | OUTPATIENT
Start: 2025-06-11 | End: 2025-06-13

## 2025-06-11 RX ADMIN — HYDROXYZINE HYDROCHLORIDE 25 MG: 25 TABLET ORAL at 20:27

## 2025-06-11 RX ADMIN — SODIUM CHLORIDE, PRESERVATIVE FREE 10 ML: 5 INJECTION INTRAVENOUS at 10:01

## 2025-06-11 RX ADMIN — LEVOFLOXACIN 750 MG: 750 INJECTION, SOLUTION INTRAVENOUS at 18:20

## 2025-06-11 RX ADMIN — TAMSULOSIN HYDROCHLORIDE 0.4 MG: 0.4 CAPSULE ORAL at 09:59

## 2025-06-11 RX ADMIN — SODIUM CHLORIDE, PRESERVATIVE FREE 10 ML: 5 INJECTION INTRAVENOUS at 20:27

## 2025-06-11 RX ADMIN — METRONIDAZOLE 500 MG: 500 INJECTION, SOLUTION INTRAVENOUS at 20:26

## 2025-06-11 RX ADMIN — CEFEPIME 2000 MG: 2 INJECTION, POWDER, FOR SOLUTION INTRAVENOUS at 18:21

## 2025-06-11 RX ADMIN — METOPROLOL SUCCINATE 25 MG: 25 TABLET, EXTENDED RELEASE ORAL at 09:59

## 2025-06-11 RX ADMIN — GUAIFENESIN 600 MG: 600 TABLET, MULTILAYER, EXTENDED RELEASE ORAL at 20:27

## 2025-06-11 RX ADMIN — Medication: at 21:00

## 2025-06-11 RX ADMIN — ALBUTEROL SULFATE 2.5 MG: 2.5 SOLUTION RESPIRATORY (INHALATION) at 19:51

## 2025-06-11 RX ADMIN — FILGRASTIM-AAFI 480 MCG: 480 INJECTION, SOLUTION SUBCUTANEOUS at 18:22

## 2025-06-11 RX ADMIN — Medication: at 10:03

## 2025-06-11 NOTE — WOUND CARE
Wound care consult for the buttocks redness.  Chart reviewed and patient assessed. This has apparrently developed since he has been here. Pt. Is 84 years old and he is admitted due to Respiratory Failure.     Past Medical History:   Diagnosis Date    CAD (coronary artery disease)     Hypercholesterolemia     Hypertension     Sciatica     STEMI (ST elevation myocardial infarction) (HCC) 08/07/2018    RCA     Past Surgical History:   Procedure Laterality Date    BACK SURGERY      PORT SURGERY Left 6/6/2025    LEFT SUBCLAVIAN VEIN PORT INSERTION performed by Donte Rios MD at [facility] MAIN OR    PTCA  08/07/2018    PTCA/PAMELA RCA (STEMI)    US BIOPSY LIVER PERCUTANEOUS  6/3/2025    US BIOPSY LIVER PERCUTANEOUS 6/3/2025 [facility] RAD US       Assessment and Treatment: Pt. Is alert and oriented x 3. He turns with assistance. The lesion on the buttocks / sacrum area is largely non-blanchable and deep red but no purple. Pt. Stated that he scraped up against something hard a few days ago which is fairly consistent with the exam.   Staff treating the skin lesion with the Venelex ointment and we will increase this to three times per day and to remain completely off of it.     6-10-25 buttocks / sacrum  Plan: We will examine the patient again later in the week to see if the lesion opens up. Continue the Venelex ointment and a Sacrum Foam Dressing - the large one. Date the dressing but can still apply the 2nd and 3rd layer of the venelex and use the same dressing is not soiled. Pt. Needs to stay off of his back and in a flatter position in the bed when possible.   Apryl Varela, RN, BSN, CWON

## 2025-06-11 NOTE — PLAN OF CARE
Problem: Skin/Tissue Integrity  Goal: Skin integrity remains intact  Description: 1.  Monitor for areas of redness and/or skin breakdown2.  Assess vascular access sites hourly3.  Every 4-6 hours minimum:  Change oxygen saturation probe site4.  Every 4-6 hours:  If on nasal continuous positive airway pressure, respiratory therapy assess nares and determine need for appliance change or resting period  Outcome: Not Progressing  Flowsheets (Taken 6/10/2025 0830)  Skin Integrity Remains Intact:   Assess vascular access sites hourly   Monitor for areas of redness and/or skin breakdown   Turn and reposition as indicated     Problem: Safety - Adult  Goal: Free from fall injury  Outcome: Progressing     Problem: Occupational Therapy - Adult  Goal: By Discharge: Performs self-care activities at highest level of function for planned discharge setting.  See evaluation for individualized goals.  Description: FUNCTIONAL STATUS PRIOR TO ADMISSION:  pt stated independence w/ ADLs and mod I for mobility w/ RW   , Prior Level of Assist for ADLs: Independent,  ,  ,  ,  ,  , Prior Level of Assist for Homemaking: Independent,  , Prior Level of Assist for Transfers: Independent, Active : Yes     HOME SUPPORT: pt lived alone     Occupational Therapy Goals:  Weekly Reassessment 6/10/25 goals updated   1.  Patient will perform upper body dressing with minimal assistance within 7 day(s).  2.  Patient will perform grooming with stand by assistance within 7 day(s).  3.  Patient will perform lower body dressing with moderate assistance within 7 day(s).  4.  Patient will perform toilet transfers with stand by assistance  within 7 day(s).  5.  Patient will perform all aspects of toileting with minimal assistance within 7 day(s).  6.  Patient will participate in upper extremity therapeutic exercise/activities with Supervision for 7 minutes within 7 day(s).    7.  Patient will utilize energy conservation techniques during functional

## 2025-06-11 NOTE — PROGRESS NOTES
Occupational Therapy    Chart reviewed in prep for skilled OT treatment; however, pt requested OT to defer secondary to high fatigue.  OT to defer and continue to follow.    Thank you,  Matthew Kerley, OT

## 2025-06-11 NOTE — PROGRESS NOTES
[facility] [place] [facility]  [address] [place] [id number]  W: [phone] F: [phone]    Hematology/ Oncology Progress Note      Reason for consult:     Francisca Norris is a 84 y.o. male who we have been asked to see by Dr. Jean-Baptiste for new metastatic lung cancer with mets to the liver.    Subjective:     Francisca Norris is a 84 y.o.  male with PMHx significant for CAD, hypercholesterolemia, HTN, sciatica who presented to [facility] for complaints of weakness, back and leg pain. He complained he has had this pain for 2-3 weeks and is concerned about falling. CT imaging of the abdomen was done to rule out PE due to SOB requiring 2L nasal cannula. Imaging showed lung lesion with possible mets to the liver. Liver bx revealed a Dx of Extensive stage SCLC. He has received one cycle of Carbo/Etoposide. He is doing fair.         Review of Systems:  Pertinent items are noted in the History of Present Illness.       Past Medical History:   Diagnosis Date    CAD (coronary artery disease)     Hypercholesterolemia     Hypertension     Sciatica     STEMI (ST elevation myocardial infarction) (HCC) 2018    RCA     Past Surgical History:   Procedure Laterality Date    BACK SURGERY      PORT SURGERY Left 2025    LEFT SUBCLAVIAN VEIN PORT INSERTION performed by Donte Rios MD at [facility] MAIN OR    PTCA  2018    PTCA/PAMELA RCA (STEMI)    US BIOPSY LIVER PERCUTANEOUS  6/3/2025    US BIOPSY LIVER PERCUTANEOUS 6/3/2025 [facility] RAD US      History reviewed. No pertinent family history.  Social History     Tobacco Use    Smoking status: Former     Current packs/day: 0.00     Types: Cigarettes     Quit date: 1992     Years since quittin.8    Smokeless tobacco: Never   Substance Use Topics    Alcohol use: No      Current Facility-Administered Medications   Medication Dose Route Frequency Provider Last Rate Last Admin    meperidine (DEMEROL)

## 2025-06-11 NOTE — PROGRESS NOTES
Pulmonary Progress Note    C/c: acute hypoxic respiratory failure, lung cancer     Assessment     Acute hypoxic respiratory failure  Extensive stage small cell carcinoma lung  Liver mets from #2  Mediastinal lymphadenopathy, presumed mets  Acute kidney injury  Cirrhosis  New pancytopenia in setting of chemotherapy      Management plan    Patient is requiring 4L supplemental oxygen. Titrate to keep O2 sats above 92%  CTA of chest showed a right hilar mass that is compressing the right pulm artery and mediastinal lymphadenopathy  Liver biopsy showed small cell carcinoma  Has had first dose of chemo last weekend with carbo etoposide  Pancytopenia, oncology started Neupogen, will need platelet transfusion for severe thrombocytopenia.  Bilateral wheezing on exam today, increasing right infrahilar infiltrate, no hemoptysis, cannot rule out infection.  Obtain procalcitonin levels and sputum cultures.  Start empirical antibiotics    Case discussed with hospitalist      Medical Decision Making : High complexity problems, high amount data reviewed, moderate risk management          Hospital interval:    6/4 -patient lying comfortably in bed.  No events overnight.  Discussed plan with patient regarding bronchoscopy/EBUS.   6/5 -patient sitting comfortably in bed on 2 L supplemental oxygen, no distress.  6/6  -patient sitting up in bed with family at bedside, just got back from port placement.  6/7 - Lying comfortably in bed, about to start chemo. On 4L supplemental oxygen  6/8 - Sitting in bed resting quietly, no distress. Remains on 4L supplemental oxygen  6/9 -patient looking weak and lethargic.  On O2 4LPM  6/10- intermittent confusion, on O2 4LPM, denies shortness of breath or abdominal pain.  6/11-no complaints.  No hemoptysis.  On O2 4 LPM.  Platelets<6K.  Increasing right infrahilar infiltrate,  blood cultures no growth.  Head CT shows no acute findings      HPI: Francisca Norris is a 84 y.o.  male with PMHx

## 2025-06-11 NOTE — PROGRESS NOTES
End of Shift Note    Bedside shift change report given to ZAY Schuler (oncoming nurse) by Lisbeth Allred RN (offgoing nurse).  Report included the following information SBAR, Kardex, MAR, and Recent Results    Shift worked: 0-     Shift summary and any significant changes:    Pt IV and port flushed. Port care complete this morning. Q2 turns complete. Dayshift nurse ZAY Brown placed wound care consult for R buttock nonblanchable at the top but blanchable at the bottom. Foam dressing placed on sacrum/buttocks. Venelex ordered per nursing protocol. Pt safety and caring rounds complete.     Chemo precautions  today.    Concerns for physician to address: Critical platelet result of 6. Megan Hancock notified via perfect serve and asked to inform oncology. Dayshift RN Ganga aware.      Zone phone for oncoming shift:  [phone]       Activity:  Level of Assistance: Maximum assist, patient does 25-49%  Number times ambulated in hallways past shift: 0  Number of times OOB to chair past shift: 0    Cardiac:   Cardiac Monitoring: Yes      Cardiac Rhythm: Sinus rhythm    Access:  Current line(s): PIV  and port accessed    Genitourinary:   Urinary Status: Voiding, External catheter    Respiratory:   O2 Device: Nasal cannula  Chronic home O2 use?: NO  Incentive spirometer at bedside: NO    GI:  Last BM (including prior to admit): 25  Current diet:  ADULT DIET; Regular  ADULT ORAL NUTRITION SUPPLEMENT; Lunch, Dinner; Frozen Oral Supplement  Passing flatus: YES    Pain Management:   Patient states pain is manageable on current regimen: YES    Skin:  Brant Scale Score: 13  Interventions: Wound Offloading (Prevention Methods): Pillows, Repositioning, Turning    Patient Safety:  Fall Risk: Nursing Judgement-Fall Risk High(Add Comments): Yes  Fall Risk Interventions  Nursing Judgement-Fall Risk High(Add Comments): Yes  Toilet Every 2 Hours-In Advance of Need: Yes  Hourly Visual Checks: Awake, In chair, Confused  Fall  Visual Posted: Socks, Armband, Fall sign posted  Room Door Open: Yes  Alarm On: Bed, Chair  Patient Moved Closer to Nursing Station: No    Active Consults:   IP CONSULT TO PALLIATIVE CARE  IP CONSULT TO ONCOLOGY  IP CONSULT TO INTERVENTIONAL RADIOLOGY  IP CONSULT TO PULMONOLOGY  IP CONSULT TO UROLOGY  IP CONSULT TO GENERAL SURGERY  IP CONSULT TO DIETITIAN    Length of Stay:  Expected LOS: 11  Actual LOS: 9    Lisbeth Allred RN

## 2025-06-11 NOTE — PROGRESS NOTES
Hospitalist Progress Note    NAME:   Francisca Norris   : 1940   MRN: [id number]     Date/Time: 2025 4:44 PM  Patient PCP: Rebekah Puri APRN - NP    Estimated discharge date: ?-16, [facility]   Barriers: Counts up, Oncology clearance, Pulm clearance      Assessment / Plan:  Acute hypoxic respiratory failure POA requiring oxygen supplementation- 4L/m  Metastatic lung cancer with mets to the liver POA- Biopsy report consistent with small cell lung cancer POA  R hilar mass with compression of R pulmonary artery POA-   Altered mental status, improved    - , does not appear overloaded     CTA chest: 1. No CT evidence for pulmonary embolus at this time. Extrinsic compression of the right pulmonary arterial system and the right pulmonary veins by a right hilar mass.  2. Right hilar mass or clustered lymph nodes.  3. Mediastinal lymphadenopathy.  4. Incidental upper abdominal findings previously described.     : Patient was not oriented to time place and person, was on 2 L of oxygen the MAP was on the low side.  500 mL of IV fluid given.  Nuclear med bone scan ordered.  Will follow-up on the liver biopsy result.   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  Oncology has a family meeting today family need to choose between starting the chemotherapy while inpatient versus hospice.  NM bone scan with no significant findings  On : S/p left subclavian vein port insertion by general surgery.  As per oncology-patient would like to pursue 1 cycle of carbo etoposide.  Plan to proceed on  after port placement.  Patient will continue to stay till  for final dose of etoposide.  Discussed with CM plan to transition to SNF on  after completion of chemotherapy.  Patient is considering transition to hospice based on how he tolerates cycle 1.  : Tolerating chemotherapy.  Patient denies acute distress, currently on 4 L of oxygen  : Finished chemotherapy today, on 4 L of

## 2025-06-11 NOTE — PROGRESS NOTES
Physical Therapy    Pt cleared for and approached for treatment session. RN stating that tolerance may be limited. Spoke with pt, pt politely declines session due significant fatigue. Will defer and continue to follow.    Neisha Garcia PT

## 2025-06-11 NOTE — PROGRESS NOTES
..End of Shift Note    Bedside shift change report given to ZAY Bob (oncoming nurse) by Ganga Kirby RN (offgoing nurse).  Report included the following information SBAR, Intake/Output, MAR, and Recent Results    Shift worked:  7619-7487     Shift summary and any significant changes:     Pt given prescribed meds per MAR. Type and screen collected and sent. Blood product consent form completed. Platelets transfused, post CBC drawn and sent. Caring rounds completed. Neutropenic precautions in place.        Ganga Kirby RN

## 2025-06-11 NOTE — PROGRESS NOTES
Pharmacy Note - Cefepime    Cefepime 1 gm IVPB q 12 h (30 min infusion) ordered for treatment of Pneumonia (Nosocomial). Per [facility] Policy, Cefepime will be changed to 2 gm IVPB x 1 over 30 min to be followed in 12 hrs by Cefepime 1 gm IVPB q 12 h (4 hr infusion).     Estimated Creatinine Clearance: Estimated Creatinine Clearance: 22 mL/min (A) (based on SCr of 2.47 mg/dL (H)).  Dialysis Status, BRIANNA, CKD: BRIANNA    BMI:  Body mass index is 24.41 kg/m².    Rationale for Adjustment:  [facility] B-Lactam extended infusion policy adjusted for renal fx (CrCl ~ 22 ml/min)    Pharmacy will continue to monitor and adjust dose as necessary.      Please call inpatient pharmacy with any questions.    Thank you,  MINGO DELGADILLO, [facility] MS

## 2025-06-11 NOTE — PROGRESS NOTES
End of Shift Note    Bedside shift change report given to ZAY Bob (oncoming nurse) by Stephanie Cash RN (offgoing nurse).  Report included the following information SBAR, Kardex, Intake/Output, MAR, Recent Results, and Cardiac Rhythm  Sinus rhythm, Multiform PVCs    Shift worked:  7 am to 7:30 pm     Shift summary and any significant changes:     All scheduled medications administered. Patient denied nausea and pain. Patient worked with OT during shift; 2 assist with walker from chair to bed.     Head CT and chest x-ray completed during shift. UA and paired blood cultures collected per MD orders for infection work-up. Pulmonary requested to wean oxygen to 2 L with goal to maintain saturations above 92%; oxygen saturation at 94%. Patient displayed increased work of breathing later in shift; oxygen increased to 4 L and now sating at 95-96%. One time dose IV Lasix administered per MD orders for increased swelling to all extremities, see MAR.    Bath, incontinence care, linen change and gown change provided; malewick placed and urine output documented; no bowel movement. Wound consult placed for area to right buttock.    Port care provided and flushed with positive blood return. Labs drawn per orders. Patient assisted with all meals with poor appetite; mostly drank water. Patient's daughter called and was provided with an update per RN. Bed alarm engaged. Nursing rounds and education completed.      Concerns for physician to address:  none     Zone phone for oncoming shift:   [phone]       Activity:  Level of Assistance: Maximum assist, patient does 25-49%  Number times ambulated in hallways past shift: 0  Number of times OOB to chair past shift: 1    Cardiac:   Cardiac Monitoring: Yes      Cardiac Rhythm: Sinus rhythm, Multiform PVCs    Access:  Current line(s): port accessed    Genitourinary:   Urinary Status: Voiding, External catheter    Respiratory:   O2 Device: Nasal cannula  Chronic home O2 use?: NO  Incentive

## 2025-06-11 NOTE — CONSENT
Informed Consent for Blood Component Transfusion Note    I have discussed with the patient the rationale for blood component transfusion; its benefits in treating or preventing fatigue, organ damage, or death; and its risk which includes mild transfusion reactions, rare risk of blood borne infection, or more serious but rare reactions. I have discussed the alternatives to transfusion, including the risk and consequences of not receiving transfusion. The patient had an opportunity to ask questions and had agreed to proceed with transfusion of blood components.    Electronically signed by Eliecer Jean-Baptiste MD on 6/11/25 at 12:27 PM EDT

## 2025-06-12 PROBLEM — R53.81 PHYSICAL DEBILITY: Status: ACTIVE | Noted: 2025-06-12

## 2025-06-12 PROBLEM — R53.1 GENERALIZED WEAKNESS: Status: ACTIVE | Noted: 2025-06-12

## 2025-06-12 LAB
ALBUMIN SERPL-MCNC: 2.2 G/DL (ref 3.5–5)
ALBUMIN/GLOB SERPL: 0.7 (ref 1.1–2.2)
ALP SERPL-CCNC: 207 U/L (ref 45–117)
ALT SERPL-CCNC: 76 U/L (ref 12–78)
ANION GAP SERPL CALC-SCNC: 11 MMOL/L (ref 2–12)
AST SERPL-CCNC: 110 U/L (ref 15–37)
BASOPHILS # BLD: 0 K/UL (ref 0–0.1)
BASOPHILS NFR BLD: 0 % (ref 0–1)
BILIRUB SERPL-MCNC: 0.9 MG/DL (ref 0.2–1)
BLD PROD TYP BPU: NORMAL
BLOOD BANK BLOOD PRODUCT EXPIRATION DATE: NORMAL
BLOOD BANK DISPENSE STATUS: NORMAL
BLOOD BANK ISBT PRODUCT BLOOD TYPE: 6200
BLOOD BANK PRODUCT CODE: NORMAL
BLOOD BANK UNIT TYPE AND RH: NORMAL
BPU ID: NORMAL
BUN SERPL-MCNC: 125 MG/DL (ref 6–20)
BUN/CREAT SERPL: 34 (ref 12–20)
CALCIUM SERPL-MCNC: 7.9 MG/DL (ref 8.5–10.1)
CHLORIDE SERPL-SCNC: 107 MMOL/L (ref 97–108)
CO2 SERPL-SCNC: 18 MMOL/L (ref 21–32)
CREAT SERPL-MCNC: 3.68 MG/DL (ref 0.7–1.3)
DIFFERENTIAL METHOD BLD: ABNORMAL
EOSINOPHIL # BLD: 0.03 K/UL (ref 0–0.4)
EOSINOPHIL NFR BLD: 2 % (ref 0–7)
ERYTHROCYTE [DISTWIDTH] IN BLOOD BY AUTOMATED COUNT: 14.5 % (ref 11.5–14.5)
ERYTHROCYTE [DISTWIDTH] IN BLOOD BY AUTOMATED COUNT: 14.6 % (ref 11.5–14.5)
GLOBULIN SER CALC-MCNC: 3 G/DL (ref 2–4)
GLUCOSE SERPL-MCNC: 101 MG/DL (ref 65–100)
HCT VFR BLD AUTO: 20.2 % (ref 36.6–50.3)
HCT VFR BLD AUTO: 22.5 % (ref 36.6–50.3)
HGB BLD-MCNC: 6.7 G/DL (ref 12.1–17)
HGB BLD-MCNC: 7.5 G/DL (ref 12.1–17)
HISTORY CHECK: NORMAL
IMM GRANULOCYTES # BLD AUTO: 0 K/UL (ref 0–0.04)
IMM GRANULOCYTES NFR BLD AUTO: 0 % (ref 0–0.5)
LYMPHOCYTES # BLD: 0.19 K/UL (ref 0.8–3.5)
LYMPHOCYTES NFR BLD: 12 % (ref 12–49)
MAGNESIUM SERPL-MCNC: 2.4 MG/DL (ref 1.6–2.4)
MCH RBC QN AUTO: 29.8 PG (ref 26–34)
MCH RBC QN AUTO: 29.8 PG (ref 26–34)
MCHC RBC AUTO-ENTMCNC: 33.2 G/DL (ref 30–36.5)
MCHC RBC AUTO-ENTMCNC: 33.3 G/DL (ref 30–36.5)
MCV RBC AUTO: 89.3 FL (ref 80–99)
MCV RBC AUTO: 89.8 FL (ref 80–99)
METAMYELOCYTES NFR BLD MANUAL: 1 %
MONOCYTES # BLD: 0.02 K/UL (ref 0–1)
MONOCYTES NFR BLD: 1 % (ref 5–13)
NEUTS SEG # BLD: 1.34 K/UL (ref 1.8–8)
NEUTS SEG NFR BLD: 84 % (ref 32–75)
NRBC # BLD: 0 K/UL (ref 0–0.01)
NRBC # BLD: 0 K/UL (ref 0–0.01)
NRBC BLD-RTO: 0 PER 100 WBC
NRBC BLD-RTO: 0 PER 100 WBC
PHOSPHATE SERPL-MCNC: 9.5 MG/DL (ref 2.6–4.7)
PLATELET # BLD AUTO: 24 K/UL (ref 150–400)
PLATELET # BLD AUTO: 3 K/UL (ref 150–400)
PMV BLD AUTO: 10.1 FL (ref 8.9–12.9)
PMV BLD AUTO: 10.8 FL (ref 8.9–12.9)
POTASSIUM SERPL-SCNC: 5.3 MMOL/L (ref 3.5–5.1)
PROT SERPL-MCNC: 5.2 G/DL (ref 6.4–8.2)
RBC # BLD AUTO: 2.25 M/UL (ref 4.1–5.7)
RBC # BLD AUTO: 2.52 M/UL (ref 4.1–5.7)
RBC MORPH BLD: ABNORMAL
SODIUM SERPL-SCNC: 136 MMOL/L (ref 136–145)
UNIT DIVISION: 0
UNIT ISSUE DATE/TIME: NORMAL
WBC # BLD AUTO: 1.2 K/UL (ref 4.1–11.1)
WBC # BLD AUTO: 1.6 K/UL (ref 4.1–11.1)

## 2025-06-12 PROCEDURE — 6360000002 HC RX W HCPCS: Performed by: INTERNAL MEDICINE

## 2025-06-12 PROCEDURE — 2500000003 HC RX 250 WO HCPCS: Performed by: SURGERY

## 2025-06-12 PROCEDURE — 2580000003 HC RX 258: Performed by: INTERNAL MEDICINE

## 2025-06-12 PROCEDURE — 6370000000 HC RX 637 (ALT 250 FOR IP): Performed by: INTERNAL MEDICINE

## 2025-06-12 PROCEDURE — 94761 N-INVAS EAR/PLS OXIMETRY MLT: CPT

## 2025-06-12 PROCEDURE — 94640 AIRWAY INHALATION TREATMENT: CPT

## 2025-06-12 PROCEDURE — 36430 TRANSFUSION BLD/BLD COMPNT: CPT

## 2025-06-12 PROCEDURE — 1100000000 HC RM PRIVATE

## 2025-06-12 PROCEDURE — P9047 ALBUMIN (HUMAN), 25%, 50ML: HCPCS | Performed by: INTERNAL MEDICINE

## 2025-06-12 PROCEDURE — 85025 COMPLETE CBC W/AUTO DIFF WBC: CPT

## 2025-06-12 PROCEDURE — 85027 COMPLETE CBC AUTOMATED: CPT

## 2025-06-12 PROCEDURE — 80053 COMPREHEN METABOLIC PANEL: CPT

## 2025-06-12 PROCEDURE — P9016 RBC LEUKOCYTES REDUCED: HCPCS

## 2025-06-12 PROCEDURE — P9073 PLATELETS PHERESIS PATH REDU: HCPCS

## 2025-06-12 PROCEDURE — 84100 ASSAY OF PHOSPHORUS: CPT

## 2025-06-12 PROCEDURE — 6370000000 HC RX 637 (ALT 250 FOR IP): Performed by: SURGERY

## 2025-06-12 PROCEDURE — 83735 ASSAY OF MAGNESIUM: CPT

## 2025-06-12 PROCEDURE — 2700000000 HC OXYGEN THERAPY PER DAY

## 2025-06-12 PROCEDURE — 99232 SBSQ HOSP IP/OBS MODERATE 35: CPT

## 2025-06-12 PROCEDURE — 94760 N-INVAS EAR/PLS OXIMETRY 1: CPT

## 2025-06-12 PROCEDURE — 36415 COLL VENOUS BLD VENIPUNCTURE: CPT

## 2025-06-12 RX ORDER — SODIUM CHLORIDE 9 MG/ML
INJECTION, SOLUTION INTRAVENOUS PRN
Status: DISCONTINUED | OUTPATIENT
Start: 2025-06-12 | End: 2025-06-13

## 2025-06-12 RX ORDER — BUMETANIDE 0.25 MG/ML
2 INJECTION, SOLUTION INTRAMUSCULAR; INTRAVENOUS ONCE
Status: COMPLETED | OUTPATIENT
Start: 2025-06-12 | End: 2025-06-12

## 2025-06-12 RX ORDER — SODIUM BICARBONATE 650 MG/1
650 TABLET ORAL 3 TIMES DAILY
Status: DISCONTINUED | OUTPATIENT
Start: 2025-06-12 | End: 2025-06-13

## 2025-06-12 RX ORDER — ALBUMIN (HUMAN) 12.5 G/50ML
25 SOLUTION INTRAVENOUS ONCE
Status: COMPLETED | OUTPATIENT
Start: 2025-06-12 | End: 2025-06-12

## 2025-06-12 RX ADMIN — SODIUM BICARBONATE 650 MG: 650 TABLET ORAL at 16:34

## 2025-06-12 RX ADMIN — Medication: at 22:00

## 2025-06-12 RX ADMIN — SODIUM ZIRCONIUM CYCLOSILICATE 10 G: 10 POWDER, FOR SUSPENSION ORAL at 16:34

## 2025-06-12 RX ADMIN — GUAIFENESIN 600 MG: 600 TABLET, MULTILAYER, EXTENDED RELEASE ORAL at 09:42

## 2025-06-12 RX ADMIN — ALBUTEROL SULFATE 2.5 MG: 2.5 SOLUTION RESPIRATORY (INHALATION) at 11:05

## 2025-06-12 RX ADMIN — METRONIDAZOLE 500 MG: 500 INJECTION, SOLUTION INTRAVENOUS at 21:27

## 2025-06-12 RX ADMIN — BUMETANIDE 2 MG: 0.25 INJECTION INTRAMUSCULAR; INTRAVENOUS at 14:17

## 2025-06-12 RX ADMIN — SODIUM BICARBONATE 650 MG: 650 TABLET ORAL at 21:29

## 2025-06-12 RX ADMIN — HYDROXYZINE HYDROCHLORIDE 25 MG: 25 TABLET ORAL at 12:39

## 2025-06-12 RX ADMIN — FILGRASTIM-AAFI 480 MCG: 480 INJECTION, SOLUTION SUBCUTANEOUS at 12:09

## 2025-06-12 RX ADMIN — CEFEPIME 1000 MG: 1 INJECTION, POWDER, FOR SOLUTION INTRAMUSCULAR; INTRAVENOUS at 18:51

## 2025-06-12 RX ADMIN — METOPROLOL SUCCINATE 25 MG: 25 TABLET, EXTENDED RELEASE ORAL at 09:42

## 2025-06-12 RX ADMIN — HYDROXYZINE HYDROCHLORIDE 25 MG: 25 TABLET ORAL at 21:29

## 2025-06-12 RX ADMIN — ALBUTEROL SULFATE 2.5 MG: 2.5 SOLUTION RESPIRATORY (INHALATION) at 07:52

## 2025-06-12 RX ADMIN — ALBUMIN (HUMAN) 25 G: 25 SOLUTION INTRAVENOUS at 14:21

## 2025-06-12 RX ADMIN — ALBUTEROL SULFATE 2.5 MG: 2.5 SOLUTION RESPIRATORY (INHALATION) at 14:57

## 2025-06-12 RX ADMIN — SODIUM CHLORIDE, PRESERVATIVE FREE 10 ML: 5 INJECTION INTRAVENOUS at 21:55

## 2025-06-12 RX ADMIN — HYDROXYZINE HYDROCHLORIDE 25 MG: 25 TABLET ORAL at 09:42

## 2025-06-12 RX ADMIN — TAMSULOSIN HYDROCHLORIDE 0.4 MG: 0.4 CAPSULE ORAL at 09:42

## 2025-06-12 RX ADMIN — SODIUM CHLORIDE, PRESERVATIVE FREE 10 ML: 5 INJECTION INTRAVENOUS at 09:45

## 2025-06-12 RX ADMIN — CEFEPIME 1000 MG: 1 INJECTION, POWDER, FOR SOLUTION INTRAMUSCULAR; INTRAVENOUS at 06:22

## 2025-06-12 RX ADMIN — GUAIFENESIN 600 MG: 600 TABLET, MULTILAYER, EXTENDED RELEASE ORAL at 21:29

## 2025-06-12 RX ADMIN — Medication: at 09:44

## 2025-06-12 RX ADMIN — Medication: at 12:39

## 2025-06-12 RX ADMIN — METRONIDAZOLE 500 MG: 500 INJECTION, SOLUTION INTRAVENOUS at 12:16

## 2025-06-12 RX ADMIN — METRONIDAZOLE 500 MG: 500 INJECTION, SOLUTION INTRAVENOUS at 04:12

## 2025-06-12 RX ADMIN — ALBUTEROL SULFATE 2.5 MG: 2.5 SOLUTION RESPIRATORY (INHALATION) at 20:57

## 2025-06-12 NOTE — PROGRESS NOTES
ADULT PROTOCOL: JET AEROSOL ASSESSMENT    Patient  Francisca Norris     84 y.o.   male     6/11/2025  11:51 PM    Breath Sounds Pre Procedure: Breath Sounds Pre-Tx MAURA: Scattered wheezes                                  Breath Sounds Pre-Tx LLL: Diminished        Breath Sounds Pre-Tx RUL: Scattered wheezes        Breath Sounds Pre-Tx RML: Diminished        Breath Sounds Pre-Tx RLL: Diminished  Breath Sounds Post Procedure:            Breath Sounds Post-Tx LLL: Diminished          Breath Sounds Post-Tx RUL: Diminished, Scattered wheezes          Breath Sounds Post-Tx RML: Diminished          Breath Sounds Post-Tx RLL: Diminished                                     Breathing pattern: Pre procedure wnl          Post procedure wnl    Heart Rate: Pre procedure Pre-Tx Pulse: 69           Post procedure Post-Tx Pulse: 77    Resp Rate: Pre procedure Pre-Tx Resps: 22           Post procedure Post-Tx Resps: 20    Peak Flow: Pre bronchodilator             Post bronchodilator       Oxygen: O2 Therapy: Oxygen   nasal cannula     Changed: No    SpO2:  SpO2: 96 %   with Oxygen                Nebulizer Therapy: Current medications Medications: Albuterol      Changed: No    Smoking History: cigarettes, former, quit 33 years ago    Problem List:   Patient Active Problem List   Diagnosis    Coronary artery disease involving native coronary artery of native heart without angina pectoris    Hypercholesterolemia    Primary hypertension    Sciatica of left side    Ischemic cardiomyopathy    PVCs (premature ventricular contractions)    Nonrheumatic mitral valve regurgitation    Acute hypoxic respiratory failure (HCC)    Multiple lesions of metastatic malignancy (HCC)    Small cell carcinoma of lung metastatic to liver (HCC)    Acute on chronic respiratory failure with hypoxia and hypercapnia (HCC)    Encounter for antineoplastic chemotherapy    Moderate protein-calorie malnutrition    Pancytopenia (HCC)       Respiratory Therapist: Diana

## 2025-06-12 NOTE — CONSULTS
Palliative Medicine  Patient Name: Francisca Norris  YOB: 1940  MRN: [id number]  Age: 84 y.o.  Gender: male    Date of Initial Consult: 6/3/2025  Date of Service: 6/12/2025  Time: 2:40 PM  Provider: BETO Arroyo CNP  Hospital Day: 11  Admit Date: 6/2/2025  Referring Provider: Hospitalist      Reasons for Consultation:  Goals of Care    HISTORY OF PRESENT ILLNESS (HPI):   Francisca Norris is a 84 y.o. male with a past medical history of urinary retention, who was admitted on 6/2/2025 from home with a diagnosis of acute hypoxic failure in the setting of new hilar mass and liver mass concerning for metastatic malignancy.     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  His wife passed away in 2014        ASSESSMENT AND PLAN:   Palliative team has been re-consulted to meet with Shelly Norris to address goals of care.  Reviewed medical chart including admit H&P, consultant notes, MAR, and recent labs/imaging.  Mr. Norris was seen and evaluated, no family at bedside. Assigned RN present in the process of hanging platelets.   At time of my arrival, he was resting in bed in no acute distress.   On exam the patient is lethargic, the pt was not engaged, and did not participate in conversation.    Outgoing call placed to patient's daughterGenoveva to schedule GOC discussion.  Daughter reported that she will be here this evening and will call when at bedside.    This provider followed up - family still not at bedside.  Will continue to follow for GOC discussion.    Patient would like to name his daughter Genoveva as primary medical power of .  Will also help him complete an AMD during this hospitalization.  Discussed case with hospitalist, Dr. Jean-Baptiste and assigned, RN  Please call with any palliative questions or concerns.

## 2025-06-12 NOTE — PROGRESS NOTES
Physical Therapy    Spoke with pt's RN. Pt's platelets now down to 3 and awaiting transfusion. Palliative also to see. Will defer and follow as appropriate.    Neisha Garcia PT

## 2025-06-12 NOTE — PROGRESS NOTES
End of Shift Note    Bedside shift change report given to ZAY Schuler (oncoming nurse) by Lisbeth Allred RN (offgoing nurse).  Report included the following information SBAR, Kardex, MAR, and Recent Results    Shift worked: 1900-700     Shift summary and any significant changes:    Pt IV and port flushed. Port care complete this morning. Foam dressing replaced, Venelex applied to sacrum and buttocks.Pt repositioned but mainly stayed on L side to keep pressure off R buttocks per wound care nurse order. Pt safety and caring rounds complete.      Concerns for physician to address: None      Zone phone for oncoming shift:  [phone]       Activity:  Level of Assistance: Maximum assist, patient does 25-49%  Number times ambulated in hallways past shift: 0  Number of times OOB to chair past shift: 0    Cardiac:   Cardiac Monitoring: Yes      Cardiac Rhythm: Sinus rhythm    Access:  Current line(s): Port accessed     Genitourinary:   Urinary Status: Voiding, External catheter    Respiratory:   O2 Device: Nasal cannula  Chronic home O2 use?: NO  Incentive spirometer at bedside: NO    GI:  Last BM (including prior to admit): 06/08/25  Current diet:  ADULT DIET; Regular  ADULT ORAL NUTRITION SUPPLEMENT; Lunch, Dinner; Frozen Oral Supplement  Passing flatus: YES    Pain Management:   Patient states pain is manageable on current regimen: YES    Skin:  Brant Scale Score: 15  Interventions: Wound Offloading (Prevention Methods): Repositioning, Elevate heels    Patient Safety:  Fall Risk: Nursing Judgement-Fall Risk High(Add Comments): Yes  Fall Risk Interventions  Nursing Judgement-Fall Risk High(Add Comments): Yes  Toilet Every 2 Hours-In Advance of Need: Yes  Hourly Visual Checks: Awake, In bed  Fall Visual Posted: Socks  Room Door Open: Yes  Alarm On: Bed  Patient Moved Closer to Nursing Station: No    Active Consults:   IP CONSULT TO PALLIATIVE CARE  IP CONSULT TO ONCOLOGY  IP CONSULT TO INTERVENTIONAL RADIOLOGY  IP CONSULT TO

## 2025-06-12 NOTE — PROGRESS NOTES
..End of Shift Note    Bedside shift change report given to ZAY Barrera (oncoming nurse) by Ganga Kirby RN (offgoing nurse).  Report included the following information SBAR, Intake/Output, MAR, and Recent Results    Shift worked:  5741-4617     Shift summary and any significant changes:    Pt given prescribed meds per MAR. No PRN meds given. 1 unit of platelets transfused, clarified one unit and not two with attending provider. Palliative consult placed. Provider aware of bloody/brown colored urine. Labs collected and sent per order. Caring rounds completed.        Ganga Kirby RN

## 2025-06-12 NOTE — PLAN OF CARE
Problem: Safety - Adult  Goal: Free from fall injury  Outcome: Progressing     Problem: ABCDS Injury Assessment  Goal: Absence of physical injury  Outcome: Progressing     Problem: Respiratory - Adult  Goal: Achieves optimal ventilation and oxygenation  6/11/2025 2156 by Lisbeth Allred RN  Outcome: Progressing  6/11/2025 2003 by Diana Stringer RCP  Outcome: Progressing     Problem: Pain  Goal: Verbalizes/displays adequate comfort level or baseline comfort level  Outcome: Not Progressing     Problem: Skin/Tissue Integrity  Goal: Skin integrity remains intact  Description: 1.  Monitor for areas of redness and/or skin breakdown2.  Assess vascular access sites hourly3.  Every 4-6 hours minimum:  Change oxygen saturation probe site4.  Every 4-6 hours:  If on nasal continuous positive airway pressure, respiratory therapy assess nares and determine need for appliance change or resting period  Outcome: Not Progressing

## 2025-06-12 NOTE — PROGRESS NOTES
[facility] [place] [facility]  [address] [facility] [address] [address]  W: [phone] F: [phone]    Hematology/ Oncology Progress Note      Reason for consult:     Francisca Norris is a 84 y.o. male who we have been asked to see by Dr. Jean-Baptiste for new metastatic lung cancer with mets to the liver.    Subjective:     Francisca Norris is a 84 y.o.  male with PMHx significant for CAD, hypercholesterolemia, HTN, sciatica who presented to [facility] for complaints of weakness, back and leg pain. He complained he has had this pain for 2-3 weeks and is concerned about falling. CT imaging of the abdomen was done to rule out PE due to SOB requiring 2L nasal cannula. Imaging showed lung lesion with possible mets to the liver. Liver bx revealed a Dx of Extensive stage SCLC. He has received one cycle of Carbo/Etoposide. He is doing fair.         Review of Systems:  Pertinent items are noted in the History of Present Illness.       Past Medical History:   Diagnosis Date    CAD (coronary artery disease)     Hypercholesterolemia     Hypertension     Sciatica     STEMI (ST elevation myocardial infarction) (HCC) 2018    RCA     Past Surgical History:   Procedure Laterality Date    BACK SURGERY      PORT SURGERY Left 2025    LEFT SUBCLAVIAN VEIN PORT INSERTION performed by Donte Rios MD at [facility]    PTCA  2018    PTCA/PAMELA RCA (STEMI)    US BIOPSY LIVER PERCUTANEOUS  6/3/2025    US BIOPSY LIVER PERCUTANEOUS 6/3/2025 [facility] RAD US      History reviewed. No pertinent family history.  Social History     Tobacco Use    Smoking status: Former     Current packs/day: 0.00     Types: Cigarettes     Quit date: 1992     Years since quittin.8    Smokeless tobacco: Never   Substance Use Topics    Alcohol use: No      Current Facility-Administered Medications   Medication Dose Route Frequency Provider Last Rate Last Admin    0.9 % sodium chloride

## 2025-06-12 NOTE — PROGRESS NOTES
Hospitalist Progress Note    NAME:   Francisca Norris   : 1940   MRN: [id number]     Date/Time: 2025 4:48 PM  Patient PCP: Rebekah Puri APRN - NP    Estimated discharge date: ?-16, [facility]   Barriers: Counts up, Oncology clearance, Pulm clearance      Assessment / Plan:  Acute hypoxic respiratory failure POA requiring oxygen supplementation- 4L/m  Metastatic lung cancer with mets to the liver POA- Biopsy report consistent with small cell lung cancer POA  R hilar mass with compression of R pulmonary artery POA-   Altered mental status, improved    - , does not appear overloaded     CTA chest: 1. No CT evidence for pulmonary embolus at this time. Extrinsic compression of the right pulmonary arterial system and the right pulmonary veins by a right hilar mass.  2. Right hilar mass or clustered lymph nodes.  3. Mediastinal lymphadenopathy.  4. Incidental upper abdominal findings previously described.     : Patient was not oriented to time place and person, was on 2 L of oxygen the MAP was on the low side.  500 mL of IV fluid given.  Nuclear med bone scan ordered.  Will follow-up on the liver biopsy result.   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  Oncology has a family meeting today family need to choose between starting the chemotherapy while inpatient versus hospice.  NM bone scan with no significant findings  On : S/p left subclavian vein port insertion by general surgery.  As per oncology-patient would like to pursue 1 cycle of carbo etoposide.  Plan to proceed on  after port placement.  Patient will continue to stay till  for final dose of etoposide.  Discussed with CM plan to transition to SNF on  after completion of chemotherapy.  Patient is considering transition to hospice based on how he tolerates cycle 1.  : Tolerating chemotherapy.  Patient denies acute distress, currently on 4 L of oxygen  : Finished chemotherapy today, on 4 L of  unremarkable examination.     CT abdomen pelvis:Numerous hypodense lesions in the liver, concerning for metastatic disease. 17 mm nonspecific left adrenal nodule. Posterior bladder wall thickening. Suggest cystoscopic evaluation to evaluate for mass.  No nephrolithiasis or hydronephrosis.       Acute kidney injury POA-  worsening  -unclear baseline-most recent creatinine was in 2022 and 0.97  -holding home losartan  -No nephrolithiasis or hydronephrosis on CT abd  6/4: Creatinine increasing from 1.76-1.95   6/5: Creatinine 1.84 today will continue IV fluid as patient has poor p.o. intake  On 6/6 no labs  6/7 : cr improving , continue IVF   6/8: Mild elevation in creatinine however still close to baseline, continue IVF  6/9: Creatinine improving, continue IVF due to poor p.o. intake  6/10: Mild rise in creatinine, but close to baseline.  Stop IV fluid due to volume up on exam and on chest x-ray  6/11: Cr cont to rise to 2.47 today-- Nephrology consulted - ?ATN        Hypercalcemia  - Likely due to metastatic disease  S/p   IV hydration        Generalized weakness POA  -due to above  -appreciate assistance from PT/OT     Hypertension-  Cont holding home meds until further BP trends     Hyperlipidemia-  Cont holding home atorvastatin with LFTs    Sciatica               Medical Decision Making:   I personally reviewed labs: Yes from admission  I personally reviewed imaging: Yes from admission  I personally reviewed EKG:  Toxic drug monitoring: Chemotherapy-side effects  Discussed case with: Pt,RN, CM on IDRs & CBRs, family        Code Status: Full code  DVT Prophylaxis: SCD  GI Prophylaxis:    Subjective:     Chief Complaint / Reason for Physician Visit  \" I am feeling weak\".  Discussed with RN events overnight.     Cr up at 3.6 today, Counts down, PLT down at 3.0  To get 1 PLT again today-consent obtained  Nephrology consulted - following  Palliative consulted today for transition to comfort measures as per pt

## 2025-06-12 NOTE — PROGRESS NOTES
ADULT PROTOCOL: JET AEROSOL ASSESSMENT    Patient  Francisca Norris     84 y.o.   male     6/12/2025  8:23 AM    Breath Sounds Pre Procedure: Breath Sounds Pre-Tx MAURA: Diminished                                  Breath Sounds Pre-Tx LLL: Diminished        Breath Sounds Pre-Tx RUL: Diminished        Breath Sounds Pre-Tx RML: Diminished        Breath Sounds Pre-Tx RLL: Diminished  Breath Sounds Post Procedure: Breath Sounds Post-Tx MAURA: Diminished          Breath Sounds Post-Tx LLL: Diminished          Breath Sounds Post-Tx RUL: Diminished          Breath Sounds Post-Tx RML: Diminished          Breath Sounds Post-Tx RLL: Diminished                                       Heart Rate: Pre procedure Pre-Tx Pulse: 82           Post procedure Post-Tx Pulse: 86    Resp Rate: Pre procedure Pre-Tx Resps: 20           Post procedure Post-Tx Resps: 20    Oxygen: O2 Therapy: Oxygen humidified   nasal cannula     Changed: No    SpO2:  SpO2: 98 %   with Oxygen                Nebulizer Therapy: Current medications Medications: Albuterol      Changed: No        Problem List:   Patient Active Problem List   Diagnosis    Coronary artery disease involving native coronary artery of native heart without angina pectoris    Hypercholesterolemia    Primary hypertension    Sciatica of left side    Ischemic cardiomyopathy    PVCs (premature ventricular contractions)    Nonrheumatic mitral valve regurgitation    Acute hypoxic respiratory failure (HCC)    Multiple lesions of metastatic malignancy (HCC)    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

## 2025-06-12 NOTE — PROGRESS NOTES
Physical Therapy    Followed up per OT, pt is declining therapy this am. Will follow up in pm as able.    Neisha Garcia PT

## 2025-06-12 NOTE — PROGRESS NOTES
Occupational Therapy   06.12.2025    Chart reviewed in prep for OT treatment, cleared to be seen. Patient received in bed and reporting significant fatigue. Patient requesting to defer therapy this AM. Will defer and follow up as able and medically appropriate. Thank you.     Yaneth Weller MS, OTR/L

## 2025-06-12 NOTE — PROGRESS NOTES
Pulmonary Progress Note    C/c: acute hypoxic respiratory failure, lung cancer     Assessment     Acute hypoxic respiratory failure  Extensive stage small cell carcinoma lung  Neutropenic sepsis  Severe thrombocytopenia  UTI  Liver mets from #2  Mediastinal lymphadenopathy, presumed mets  Acute kidney injury  Cirrhosis    Management plan    Patient is requiring 4L supplemental oxygen. Titrate to keep O2 sats above 92%  CTA of chest showed a right hilar mass that is compressing the right pulm artery and mediastinal lymphadenopathy. Liver mass biopsy showed small cell carcinoma  Has had first dose of chemo last weekend with carbo etoposide  Pancytopenia, on Neupogen, very severe thrombocytopenia, getting platelet transfusions  Right infrahilar infiltrate on chest x-ray 6/10.  Repeat chest film   continue broad-spectrum antibiotics.  GNR UTI, ID and susceptibilities pending    Medical Decision Making : High complexity problems, high amount data reviewed, moderate risk management            Hospital interval:    6/4 -patient lying comfortably in bed.  No events overnight.  Discussed plan with patient regarding bronchoscopy/EBUS.   6/5 -patient sitting comfortably in bed on 2 L supplemental oxygen, no distress.  6/6  -patient sitting up in bed with family at bedside, just got back from port placement.  6/7 - Lying comfortably in bed, about to start chemo. On 4L supplemental oxygen  6/8 - Sitting in bed resting quietly, no distress. Remains on 4L supplemental oxygen  6/9 -patient looking weak and lethargic.  On O2 4LPM  6/10- intermittent confusion, on O2 4LPM, denies shortness of breath or abdominal pain.  6/11-no complaints.  No hemoptysis.  On O2 4 LPM.  Neutropenic, platelets<6K.  Increasing right infrahilar infiltrate,  blood cultures no growth.  Head CT shows no acute findings  6/12-mild nasal bleed.  Nebulizers helping with breathing.  O2 needs unchanged.  Platelets <3K, neutropenic.  Procalcitonin> 3,  urine growing GNR    HPI: Francisca Norris is a 84 y.o.  male with PMHx significant for CAD, hypercholesterolemia, HTN, sciatica who presented to [facility] for complaints of weakness, back and leg pain. He complained he has had this pain for 2-3 weeks and is concerned about falling. CT imaging of the abdomen was done to rule out PE due to SOB requiring 2L nasal cannula. Imaging showed lung lesion with possible mets to the liver.     The patient was a smoker for about 25 years, he quit in 1993. Smoked about 2 ppd.    He worked in a grocery store   No personal or family history of lung disease    Review of Systems: All other systems have been reviewed and are negative except per HPI    Past Medical History:  Past Medical History:   Diagnosis Date    CAD (coronary artery disease)     Hypercholesterolemia     Hypertension     Sciatica     STEMI (ST elevation myocardial infarction) (HCC) 08/07/2018    RCA       Medications:  Current Facility-Administered Medications   Medication Dose Route Frequency Provider Last Rate Last Admin    albumin human 25% IV solution 25 g  25 g IntraVENous Once Shubham Jean-Baptiste MD        bumetanide (BUMEX) injection 2 mg  2 mg IntraVENous Once Shubham Jean-Baptiste MD        0.9 % sodium chloride infusion   IntraVENous PRN Eliecer Jean-Baptiste MD        0.9 % sodium chloride infusion   IntraVENous PRN Eliecer Jean-Baptiste MD        balsum peru-castor oil (VENELEX) ointment   Topical TID Eliecer Jean-Baptiste MD   Given at 06/12/25 1239    albuterol (PROVENTIL) (2.5 MG/3ML) 0.083% nebulizer solution 2.5 mg  2.5 mg Nebulization 4x Daily RT Rekha Alcazar MD   2.5 mg at 06/12/25 1105    levoFLOXacin (LEVAQUIN) 750 MG/150ML infusion 750 mg  750 mg IntraVENous Q48H Eliecer Jean-Baptiste MD   Stopped at 06/11/25 1953    metroNIDAZOLE (FLAGYL) 500 mg in 0.9% NaCl 100 mL IVPB premix  500 mg IntraVENous Q8H Eliecer Jean-Baptiste MD   Stopped at 06/12/25 1321    guaiFENesin (MUCINEX) extended release tablet 600 mg

## 2025-06-12 NOTE — CONSULTS
Nephrology Consult Note     [facility]                Phone - [phone]   Patient: Francisca Norris   YOB: 1940    Date- 6/12/2025  MRN: [id number]             CONSULTING PHYSICIAN:     REASON FOR CONSULTATION: BRIANNA  ADMIT DATE:6/2/2025 PATIENT PCP:Rebekah Puri APRN - NP     IMPRESSION & PLAN:   Acute kidney injury-ATN  Hyperkalemia  NGMA  Edema, fluid overload  Hypoxia  Lung cancer metastatic-small cell lung cancer  Neutropenia  UTI  Cirrhosis    PLAN-  Give IV Bumex 2 mg   Albumin 25%  Lokelma p.o. 10 g  Start p.o. 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 *      [x] High complexity decision making was performed  [x] Patient is at high-risk of decompensation with multiple organ involvement    Subjective:   HPI: Francisca Norris is a 84 y.o. male is admitted with shortness of breath  Patient has developed acute kidney injury with creatinine level-3.68  He has been having hypoxia  He has received IV contrast during this admission  He has been on hydrochlorothiazide and losartan  Patient reports poor p.o. intake  Patient has been seen by oncology for his metastatic lung cancer   .  His creatinine level was 1.87 on admission on June 2, 2025        Review of Systems:     c/o sob,  No c/o chest pain,      Wt Readings from Last 3 Encounters:   06/03/25 75 kg (165 lb 5.5 oz)   06/02/25 75 kg (165 lb 6.4 oz)   02/25/25 72.1 kg (159 lb)     Temp Readings from Last 3 Encounters:   06/12/25 97.9 °F (36.6 °C)   06/02/25 98.3 °F (36.8 °C) (Oral)   02/25/25 98.3 °F (36.8 °C) (Temporal)     BP Readings from Last 3 Encounters:   06/12/25 136/66   06/02/25 (!) 145/78   02/25/25 (!) 144/76     Pulse Readings from Last 3 Encounters:   06/12/25 88   06/02/25 (!) 107   02/25/25 50      No intake/output data recorded.  Physical Exam:  General:Alert, No distress,   Eyes:No scleral icterus, No conjunctival pallor  Neck:Supple,no mass palpable,no thyromegaly  Lungs:Clears to auscultation Bilaterally, normal respiratory effort  CVS:RRR, S1 S2 normal,  No rub,  Abdomen:Soft, Non tender, No hepatosplenomegaly  Extremities: ++ LE edema  Skin:No rash or lesions, Warm and DRY   Psych: appropriate affect    :  NO WINTERS  Musculoskeletal : no redness, no joint tenderness  NEURO: Normal Speech, Non focal        CODE STATUS:  FULL  Care Plan discussed with:  PATIENT     Chart reviewed.    Lab and Radiology Data Personally Reviewed: (see below)  .labs  Recent Labs     06/10/25  0601 06/11/25  0409 06/12/25  1224    138 136   K 4.7 5.0 5.3*   * 112* 107   CO2 22 19* 18*   GLUCOSE 96 93 101*   BUN 92* 106* 125*   CREATININE 1.71* 2.47* 3.68*   CALCIUM 9.0 8.6 7.9*       Recent Labs     06/10/25  0601 06/11/25  0409 06/11/25  1725 06/12/25  1224   WBC 2.3* 2.7* 2.4* 1.6*   RBC 2.93* 2.68* 2.60* 2.52*   HGB 8.8* 7.9* 7.6* 7.5*   HCT 26.9* 24.2* 23.5* 22.5*   MCV 91.8 90.3 90.4 89.3   MCH 30.0 29.5 29.2 29.8   MCHC 32.7 32.6 32.3 33.3   RDW 14.9* 14.8* 14.6* 14.6*   PLT 32* 6* 9* 3*   MPV 12.5  --  11.4 10.1     No results found for: \"IRON\", \"TIBC\"  No results found for: \"PTH\"  No results found for: \"LABA1C\"  Lab Results   Component Value Date/Time    COLORU YELLOW 06/10/2025 06:41 PM    CLARITYU CLEAR 03/24/2022 12:17 AM    GLUCOSEU

## 2025-06-13 ENCOUNTER — APPOINTMENT (OUTPATIENT)
Facility: HOSPITAL | Age: 85
DRG: 180 | End: 2025-06-13
Attending: STUDENT IN AN ORGANIZED HEALTH CARE EDUCATION/TRAINING PROGRAM
Payer: MEDICARE

## 2025-06-13 PROBLEM — N17.9 AKI (ACUTE KIDNEY INJURY): Status: ACTIVE | Noted: 2025-06-13

## 2025-06-13 PROBLEM — E88.09 HYPOALBUMINEMIA: Status: ACTIVE | Noted: 2025-06-13

## 2025-06-13 PROBLEM — Z66 DNR (DO NOT RESUSCITATE): Status: ACTIVE | Noted: 2025-06-13

## 2025-06-13 LAB
ABO + RH BLD: NORMAL
ALBUMIN SERPL-MCNC: 2.4 G/DL (ref 3.5–5)
ALBUMIN/GLOB SERPL: 0.9 (ref 1.1–2.2)
ALP SERPL-CCNC: 185 U/L (ref 45–117)
ALT SERPL-CCNC: 61 U/L (ref 12–78)
ANION GAP SERPL CALC-SCNC: 8 MMOL/L (ref 2–12)
AST SERPL-CCNC: 70 U/L (ref 15–37)
BASOPHILS # BLD: 0 K/UL (ref 0–0.1)
BASOPHILS NFR BLD: 0 % (ref 0–1)
BILIRUB SERPL-MCNC: 1.2 MG/DL (ref 0.2–1)
BLD PROD TYP BPU: NORMAL
BLD PROD TYP BPU: NORMAL
BLOOD BANK BLOOD PRODUCT EXPIRATION DATE: NORMAL
BLOOD BANK BLOOD PRODUCT EXPIRATION DATE: NORMAL
BLOOD BANK DISPENSE STATUS: NORMAL
BLOOD BANK DISPENSE STATUS: NORMAL
BLOOD BANK ISBT PRODUCT BLOOD TYPE: 600
BLOOD BANK ISBT PRODUCT BLOOD TYPE: 600
BLOOD BANK PRODUCT CODE: NORMAL
BLOOD BANK PRODUCT CODE: NORMAL
BLOOD BANK UNIT TYPE AND RH: NORMAL
BLOOD BANK UNIT TYPE AND RH: NORMAL
BLOOD GROUP ANTIBODIES SERPL: NORMAL
BPU ID: NORMAL
BPU ID: NORMAL
BUN SERPL-MCNC: 140 MG/DL (ref 6–20)
BUN/CREAT SERPL: 35 (ref 12–20)
CALCIUM SERPL-MCNC: 8 MG/DL (ref 8.5–10.1)
CHLORIDE SERPL-SCNC: 109 MMOL/L (ref 97–108)
CHLORIDE UR-SCNC: 59 MMOL/L
CO2 SERPL-SCNC: 19 MMOL/L (ref 21–32)
CREAT SERPL-MCNC: 4.03 MG/DL (ref 0.7–1.3)
CREAT UR-MCNC: 38.9 MG/DL
CROSSMATCH RESULT: NORMAL
DIFFERENTIAL METHOD BLD: ABNORMAL
EOSINOPHIL # BLD: 0.02 K/UL (ref 0–0.4)
EOSINOPHIL NFR BLD: 2 % (ref 0–7)
ERYTHROCYTE [DISTWIDTH] IN BLOOD BY AUTOMATED COUNT: 14.6 % (ref 11.5–14.5)
GLOBULIN SER CALC-MCNC: 2.6 G/DL (ref 2–4)
GLUCOSE SERPL-MCNC: 98 MG/DL (ref 65–100)
HCT VFR BLD AUTO: 21.4 % (ref 36.6–50.3)
HGB BLD-MCNC: 7.1 G/DL (ref 12.1–17)
IMM GRANULOCYTES # BLD AUTO: 0 K/UL (ref 0–0.04)
IMM GRANULOCYTES NFR BLD AUTO: 0 % (ref 0–0.5)
LYMPHOCYTES # BLD: 0.18 K/UL (ref 0.8–3.5)
LYMPHOCYTES NFR BLD: 16 % (ref 12–49)
MAGNESIUM SERPL-MCNC: 2.4 MG/DL (ref 1.6–2.4)
MCH RBC QN AUTO: 29.6 PG (ref 26–34)
MCHC RBC AUTO-ENTMCNC: 33.2 G/DL (ref 30–36.5)
MCV RBC AUTO: 89.2 FL (ref 80–99)
MONOCYTES # BLD: 0 K/UL (ref 0–1)
MONOCYTES NFR BLD: 0 % (ref 5–13)
NEUTS SEG # BLD: 0.9 K/UL (ref 1.8–8)
NEUTS SEG NFR BLD: 82 % (ref 32–75)
NRBC # BLD: 0 K/UL (ref 0–0.01)
NRBC BLD-RTO: 0 PER 100 WBC
PHOSPHATE SERPL-MCNC: 10.5 MG/DL (ref 2.6–4.7)
PLATELET # BLD AUTO: 13 K/UL (ref 150–400)
PMV BLD AUTO: 10.7 FL (ref 8.9–12.9)
POTASSIUM SERPL-SCNC: 5 MMOL/L (ref 3.5–5.1)
PROT SERPL-MCNC: 5 G/DL (ref 6.4–8.2)
RBC # BLD AUTO: 2.4 M/UL (ref 4.1–5.7)
RBC MORPH BLD: ABNORMAL
SODIUM SERPL-SCNC: 136 MMOL/L (ref 136–145)
SPECIMEN EXP DATE BLD: NORMAL
TOTAL CELLS COUNTED SPEC: 50
UNIT DIVISION: 0
UNIT DIVISION: 0
UNIT ISSUE DATE/TIME: NORMAL
UNIT ISSUE DATE/TIME: NORMAL
WBC # BLD AUTO: 1.1 K/UL (ref 4.1–11.1)

## 2025-06-13 PROCEDURE — 2580000003 HC RX 258: Performed by: INTERNAL MEDICINE

## 2025-06-13 PROCEDURE — 6360000002 HC RX W HCPCS: Performed by: INTERNAL MEDICINE

## 2025-06-13 PROCEDURE — 99232 SBSQ HOSP IP/OBS MODERATE 35: CPT

## 2025-06-13 PROCEDURE — 6370000000 HC RX 637 (ALT 250 FOR IP): Performed by: INTERNAL MEDICINE

## 2025-06-13 PROCEDURE — 85025 COMPLETE CBC W/AUTO DIFF WBC: CPT

## 2025-06-13 PROCEDURE — 2700000000 HC OXYGEN THERAPY PER DAY

## 2025-06-13 PROCEDURE — 71045 X-RAY EXAM CHEST 1 VIEW: CPT

## 2025-06-13 PROCEDURE — 84100 ASSAY OF PHOSPHORUS: CPT

## 2025-06-13 PROCEDURE — 80053 COMPREHEN METABOLIC PANEL: CPT

## 2025-06-13 PROCEDURE — 1100000000 HC RM PRIVATE

## 2025-06-13 PROCEDURE — 94761 N-INVAS EAR/PLS OXIMETRY MLT: CPT

## 2025-06-13 PROCEDURE — 51798 US URINE CAPACITY MEASURE: CPT

## 2025-06-13 PROCEDURE — 99497 ADVNCD CARE PLAN 30 MIN: CPT

## 2025-06-13 PROCEDURE — 94640 AIRWAY INHALATION TREATMENT: CPT

## 2025-06-13 PROCEDURE — 6370000000 HC RX 637 (ALT 250 FOR IP): Performed by: SURGERY

## 2025-06-13 PROCEDURE — 83735 ASSAY OF MAGNESIUM: CPT

## 2025-06-13 PROCEDURE — 2500000003 HC RX 250 WO HCPCS: Performed by: SURGERY

## 2025-06-13 PROCEDURE — 82436 ASSAY OF URINE CHLORIDE: CPT

## 2025-06-13 PROCEDURE — 36415 COLL VENOUS BLD VENIPUNCTURE: CPT

## 2025-06-13 PROCEDURE — 82570 ASSAY OF URINE CREATININE: CPT

## 2025-06-13 RX ORDER — GLYCOPYRROLATE 0.2 MG/ML
0.1 INJECTION INTRAMUSCULAR; INTRAVENOUS
Status: DISCONTINUED | OUTPATIENT
Start: 2025-06-13 | End: 2025-06-15 | Stop reason: HOSPADM

## 2025-06-13 RX ORDER — DIAZEPAM 10 MG/2ML
2.5 INJECTION, SOLUTION INTRAMUSCULAR; INTRAVENOUS
Status: DISCONTINUED | OUTPATIENT
Start: 2025-06-13 | End: 2025-06-15 | Stop reason: HOSPADM

## 2025-06-13 RX ORDER — HALOPERIDOL 5 MG/ML
1 INJECTION INTRAMUSCULAR
Status: DISCONTINUED | OUTPATIENT
Start: 2025-06-13 | End: 2025-06-15 | Stop reason: HOSPADM

## 2025-06-13 RX ORDER — MORPHINE SULFATE 2 MG/ML
1 INJECTION, SOLUTION INTRAMUSCULAR; INTRAVENOUS
Status: DISCONTINUED | OUTPATIENT
Start: 2025-06-13 | End: 2025-06-15 | Stop reason: HOSPADM

## 2025-06-13 RX ADMIN — HYDROXYZINE HYDROCHLORIDE 25 MG: 25 TABLET ORAL at 08:39

## 2025-06-13 RX ADMIN — FILGRASTIM-AAFI 480 MCG: 480 INJECTION, SOLUTION SUBCUTANEOUS at 08:40

## 2025-06-13 RX ADMIN — ALBUTEROL SULFATE 2.5 MG: 2.5 SOLUTION RESPIRATORY (INHALATION) at 15:39

## 2025-06-13 RX ADMIN — TAMSULOSIN HYDROCHLORIDE 0.4 MG: 0.4 CAPSULE ORAL at 08:39

## 2025-06-13 RX ADMIN — Medication: at 08:45

## 2025-06-13 RX ADMIN — GUAIFENESIN 600 MG: 600 TABLET, MULTILAYER, EXTENDED RELEASE ORAL at 08:39

## 2025-06-13 RX ADMIN — CEFEPIME 1000 MG: 1 INJECTION, POWDER, FOR SOLUTION INTRAMUSCULAR; INTRAVENOUS at 07:39

## 2025-06-13 RX ADMIN — SODIUM BICARBONATE 650 MG: 650 TABLET ORAL at 08:39

## 2025-06-13 RX ADMIN — METOPROLOL SUCCINATE 25 MG: 25 TABLET, EXTENDED RELEASE ORAL at 08:39

## 2025-06-13 RX ADMIN — Medication: at 20:26

## 2025-06-13 RX ADMIN — ALBUTEROL SULFATE 2.5 MG: 2.5 SOLUTION RESPIRATORY (INHALATION) at 11:45

## 2025-06-13 RX ADMIN — ALBUTEROL SULFATE 2.5 MG: 2.5 SOLUTION RESPIRATORY (INHALATION) at 08:36

## 2025-06-13 RX ADMIN — ALBUTEROL SULFATE 2.5 MG: 2.5 SOLUTION RESPIRATORY (INHALATION) at 19:55

## 2025-06-13 RX ADMIN — SODIUM CHLORIDE, PRESERVATIVE FREE 10 ML: 5 INJECTION INTRAVENOUS at 08:39

## 2025-06-13 RX ADMIN — METRONIDAZOLE 500 MG: 500 INJECTION, SOLUTION INTRAVENOUS at 06:25

## 2025-06-13 ASSESSMENT — PAIN SCALES - GENERAL
PAINLEVEL_OUTOF10: 0
PAINLEVEL_OUTOF10: 0

## 2025-06-13 NOTE — PROGRESS NOTES
Occupational Therapy    Chart reviewed in prep for skilled OT; however, pt requests defer secondary to continued fatigue.  OT to defer and continue to follow.    Thank you,  Matthew Kerley, OT

## 2025-06-13 NOTE — PROGRESS NOTES
Palliative Medicine  Patient Name: Francisca Norris  YOB: 1940  MRN: [id number]  Age: 84 y.o.  Gender: male    Date of Initial Consult: 6/3/2025  Date of Service: 6/13/2025  Time: 11:23 AM  Provider: BETO Arroyo CNP  Hospital Day: 12  Admit Date: 6/2/2025  Referring Provider: Hospitalist      Reasons for Consultation:  Goals of Care    HISTORY OF PRESENT ILLNESS (HPI):   Francisca Norris is a 84 y.o. male with a past medical history of urinary retention, who was admitted on 6/2/2025 from home with a diagnosis of acute hypoxic failure in the setting of new hilar mass and liver mass concerning for metastatic malignancy.     Psychosocial: Patient lives at home independently, able to drive, he worked multiple jobs but most recently worked in a [employer], he has several children, oldest daughter Genoveva is the one who is most involved.  His wife passed away in 2014      PALLIATIVE DIAGNOSES:    Lung cancer  BRIANNA - Cr. 4.03  Hypoalbuminemia  Physical debility   Generalized weakness  DNR  Palliative Care Encounter      ASSESSMENT AND PLAN:   Today, I am following up with Shelly Norris to address goals of care.  Reviewed medical chart including admit H&P, consultant notes, MAR, and recent labs/imaging.  Mr. Norris was seen and evaluated, daughters, Genoveva, Rocío and Amita at bedside. Introduced self and role of palliative.  At time of my arrival, he was resting in bed in no acute distress. Dr. Jean-Baptiste (nephro) updated patient and family during my visit - patient overall kidney decline, not a candidate for dialysis - recommend considering comfort care.    Met with family in small conference room for goals of care discussion.    Understanding of Illness/Discussion: We discussed the patient's condition in detail.   Daughters verbalized a clear understanding of hospital events including issues leading to admission.   Family reported that they have noted the decline in patient following his last

## 2025-06-13 NOTE — PROGRESS NOTES
End of Shift Note    Bedside shift change report given to Sindy COLLAZO (oncoming nurse) by Bruce Moses RN (offgoing nurse).  Report included the following information SBAR, Kardex, Intake/Output, MAR, Recent Results, and Cardiac Rhythm NSR    Shift worked: Night   Shift summary and any significant changes:    At shift change, platelet results called as critical of 24 , trending up and hospitalist informed.Hemoglobin at 6.7 and blood ordered and commenced at 2300.  0200; Blood ended uneventfully.   0300:VSS.  Labs drawn and Platelets/Hemoglobin and Wbcs abnormal. Hospitalist informed.     Concerns for physician to address: Family meeting on palliative/hospice care possibility     Zone phone for oncoming shift:          Activity:  Level of Assistance: Maximum assist, patient does 25-49%  Number times ambulated in hallways past shift: 0  Number of times OOB to chair past shift: 0    Cardiac:   Cardiac Monitoring: Yes      Cardiac Rhythm: Sinus rhythm    Access:  Current line(s): port accessed    Genitourinary:   Urinary Status: Voiding, External catheter    Respiratory:   O2 Device: Nasal cannula  Chronic home O2 use?: YES  Incentive spirometer at bedside: YES    GI:  Last BM (including prior to admit): 06/08/25  Current diet:  ADULT DIET; Regular  ADULT ORAL NUTRITION SUPPLEMENT; Lunch, Dinner; Frozen Oral Supplement  Passing flatus: YES    Pain Management:   Patient states pain is manageable on current regimen: YES    Skin:  Brant Scale Score: 15  Interventions: Wound Offloading (Prevention Methods): Repositioning, Elevate heels    Patient Safety:  Fall Risk: Nursing Judgement-Fall Risk High(Add Comments): Yes  Fall Risk Interventions  Nursing Judgement-Fall Risk High(Add Comments): Yes  Toilet Every 2 Hours-In Advance of Need: Yes  Hourly Visual Checks: Awake, In bed  Fall Visual Posted: Socks  Room Door Open: Yes  Alarm On: Bed  Patient Moved Closer to Nursing Station: No    Active Consults:   IP CONSULT TO PALLIATIVE

## 2025-06-13 NOTE — PROGRESS NOTES
Pulmonary Progress Note    C/c: acute hypoxic respiratory failure, lung cancer     Assessment     Acute hypoxic respiratory failure  Extensive stage small cell carcinoma lung  Neutropenic sepsis  Severe thrombocytopenia  UTI  Liver mets from #2  Mediastinal lymphadenopathy, presumed mets  Acute kidney injury  Cirrhosis    Management plan    Patient is requiring 4L supplemental oxygen. Titrate to keep O2 sats above 92%  CTA of chest showed a right hilar mass that is compressing the right pulm artery and mediastinal lymphadenopathy. Liver mass biopsy showed small cell carcinoma  First dose of chemo last weekend with carbo etoposide  Pancytopenia, on Neupogen, very severe thrombocytopenia, getting platelet transfusions  New RUL pulmonary infiltrate and bibasilar infiltrates on chest x-ray today   Continue broad-spectrum antibiotics.  GNR UTI, ID and susceptibilities pending.  Poor prognosis, palliative care meeting with family today.    Medical Decision Making : High complexity problems, high amount data reviewed, moderate risk management            Hospital interval:    6/4 -patient lying comfortably in bed.  No events overnight.  Discussed plan with patient regarding bronchoscopy/EBUS.   6/5 -patient sitting comfortably in bed on 2 L supplemental oxygen, no distress.  6/6  -patient sitting up in bed with family at bedside, just got back from port placement.  6/7 - Lying comfortably in bed, about to start chemo. On 4L supplemental oxygen  6/8 - Sitting in bed resting quietly, no distress. Remains on 4L supplemental oxygen  6/9 -patient looking weak and lethargic.  On O2 4LPM  6/10- intermittent confusion, on O2 4LPM, denies shortness of breath or abdominal pain.  6/11-no complaints.  No hemoptysis.  On O2 4 LPM.  Neutropenic, platelets<6K.  Increasing right infrahilar infiltrate,  blood cultures no growth.  Head CT shows no acute findings  6/12-mild nasal bleed.  Nebulizers helping with breathing.  O2 needs  IntraVENous Q48H Eliecer Jean-Baptiste MD   Stopped at 06/11/25 1953    metroNIDAZOLE (FLAGYL) 500 mg in 0.9% NaCl 100 mL IVPB premix  500 mg IntraVENous Q8H Eliecer Jean-Baptiste MD   Stopped at 06/13/25 0740    guaiFENesin (MUCINEX) extended release tablet 600 mg  600 mg Oral BID Eliecer Jean-Baptiste MD   600 mg at 06/13/25 0839    cefepime (MAXIPIME) 1,000 mg in sodium chloride 0.9 % 50 mL IVPB (addEASE)  1,000 mg IntraVENous Q12H Eliecer Jean-Baptiste MD 12.5 mL/hr at 06/13/25 0739 1,000 mg at 06/13/25 0739    hydrOXYzine HCl (ATARAX) tablet 25 mg  25 mg Oral TID Eliecer Jean-Baptiste MD   25 mg at 06/13/25 0839    oxyCODONE (ROXICODONE) immediate release tablet 5 mg  5 mg Oral Q4H PRN Eliecer Jean-Baptiste MD        filgrastim-aafi (NIVESTYM) injection 480 mcg  480 mcg SubCUTAneous Daily Toni Owens MD   480 mcg at 06/13/25 0840    meperidine (DEMEROL) injection 12.5 mg  12.5 mg IntraVENous PRN Toni Owens MD        naloxone 0.4 mg in 10 mL sodium chloride syringe   IntraVENous PRN Donte Rios MD        sodium chloride flush 0.9 % injection 5-40 mL  5-40 mL IntraVENous PRN Donte Rios MD        0.9 % sodium chloride infusion   IntraVENous PRN Donte Rios MD        ipratropium 0.5 mg-albuterol 2.5 mg (DUONEB) nebulizer solution 1 Dose  1 Dose Inhalation Once PRN Donte Rios MD        glucose chewable tablet 16 g  4 tablet Oral PRN Donte Rios MD        dextrose bolus 10% 125 mL  125 mL IntraVENous PRN Donte Rios MD        Or    dextrose bolus 10% 250 mL  250 mL IntraVENous PRN Donte Rios MD        glucagon injection 1 mg  1 mg SubCUTAneous PRN Donte Rios MD        dextrose 10 % infusion   IntraVENous Continuous PRN Donte Rios MD        sodium chloride flush 0.9 % injection 5-40 mL  5-40 mL IntraVENous 2 times per day Donte Rios MD   10 mL at 06/13/25 0839    0.9 % sodium chloride infusion

## 2025-06-13 NOTE — PROGRESS NOTES
Hospitalist Progress Note    NAME:   Francisca Norris   : 1940   MRN: [id number]     Date/Time: 2025 1:42 PM  Patient PCP: Rebekah Puri APRN - NP    Estimated discharge date: , HOME HOSPICE arranged  Barriers:Hospice arranged with DMEs      Assessment / Plan:  Acute hypoxic respiratory failure POA requiring oxygen supplementation- 4L/m  Metastatic lung cancer with mets to the liver POA- Biopsy report consistent with small cell lung cancer POA  R hilar mass with compression of R pulmonary artery POA-   Altered mental status, improved    - , does not appear overloaded     CTA chest: 1. No CT evidence for pulmonary embolus at this time. Extrinsic compression of the right pulmonary arterial system and the right pulmonary veins by a right hilar mass.  2. Right hilar mass or clustered lymph nodes.  3. Mediastinal lymphadenopathy.  4. Incidental upper abdominal findings previously described.     : Patient was not oriented to time place and person, was on 2 L of oxygen the MAP was on the low side.  500 mL of IV fluid given.  Nuclear med bone scan ordered.  Will follow-up on the liver biopsy result.   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  Oncology has a family meeting today family need to choose between starting the chemotherapy while inpatient versus hospice.  NM bone scan with no significant findings  On : S/p left subclavian vein port insertion by general surgery.  As per oncology-patient would like to pursue 1 cycle of carbo etoposide.  Plan to proceed on  after port placement.  Patient will continue to stay till  for final dose of etoposide.  Discussed with CM plan to transition to SNF on  after completion of chemotherapy.  Patient is considering transition to hospice based on how he tolerates cycle 1.  : Tolerating chemotherapy.  Patient denies acute distress, currently on 4 L of oxygen  : Finished chemotherapy today, on 4 L of oxygen.  No  results and cultures  YES  Reviewed most current radiology test results   YES  Review and summation of old records today    NO  Reviewed patient's current orders and MAR    YES  PMH/SH reviewed - no change compared to H&P    Procedures: see electronic medical records for all procedures/Xrays and details which were not copied into this note but were reviewed prior to creation of Plan.      LABS:  I reviewed today's most current labs and imaging studies.  Pertinent labs include:  Recent Labs     06/12/25  1224 06/12/25  1835 06/13/25  0425   WBC 1.6* 1.2* 1.1*   HGB 7.5* 6.7* 7.1*   HCT 22.5* 20.2* 21.4*   PLT 3* 24* 13*     Recent Labs     06/11/25  0409 06/12/25  0404 06/12/25  1224 06/13/25  0425     --  136 136   K 5.0  --  5.3* 5.0   *  --  107 109*   CO2 19*  --  18* 19*   GLUCOSE 93  --  101* 98   *  --  125* 140*   CREATININE 2.47*  --  3.68* 4.03*   CALCIUM 8.6  --  7.9* 8.0*   MG 2.5* 2.4  --  2.4   PHOS 9.2*  --  9.5* 10.5*   BILITOT 0.6  --  0.9 1.2*   *  --  110* 70*   ALT 95*  --  76 61       Signed: Eliecer Jean-Baptiste MD

## 2025-06-13 NOTE — PROGRESS NOTES
Physical Therapy    Chart reviewed, Pt continues very limited in tolerance and prefers to rest. Platelets increased but still at 13 with threshold of 10 for another transfusion. Will monitor and follow as appropriate. If no improvement occurs in overall tolerance, will likely need to d/c from PT.    Neisha Garcia PT

## 2025-06-13 NOTE — PROGRESS NOTES
Patient: Francisca Norris MRN: [id number]  SSN: [id number]    YOB: 1940  Age: 84 y.o.  Sex: male        ADMITTED: 6/2/2025 to Eliecer Jean-Baptiste MD by Trell Rivero MD for Metastatic cancer (HCC) [C79.9]  Acute hypoxic respiratory failure (HCC) [J96.01]  POD# 7 Days Post-Op Procedure(s):  LEFT SUBCLAVIAN VEIN PORT INSERTION    Francisca Norris is doing poorly at this time.  We were asked to round on the patient this afternoon due to concerns for indwelling urethral Orozco catheter that was difficult to replace.  Nurse attempted to replace the catheter for comfort care this afternoon but failed. Saw the patient on 6/3/2025 for urethral catheter placement.  At the time we had placed an 18 [place] coudé catheter.  There was some mild bleeding at the meatus noted from the catheter placement at that time.  We had recommended that a catheter should stay for 5 days and we added Flomax 0.4 mg daily.  Catheter was removed on 6/9/2025 per nursing staff.  He had been doing well without the catheter since that time.  It was a consideration to see the patient today out of for concern for consideration for a indwelling urethral Orozco catheter for comfort.  The patient is reportedly going home on hospice.  He unfortunately has a history of metastatic lung cancer to liver.  Currently the patient has had some bloody output from his urinary output on the wall suction canister.  He has a little bit of suprapubic discomfort for pressure.  The family is at his bedside and are very good patient historians and support system.    Bladder scan performed at bedside by his nurse while I rounded showed a total scan of 396 mL at most.  I conveyed with the patient the risk with placing an indwelling urethral Orozco catheter which may include worsening of the bleeding and may perpetuate worse bleeding given the fact that his platelets are only 13.  He is also neutropenic precautions.  I discussed with my surgeon further the

## 2025-06-13 NOTE — PROGRESS NOTES
.End of Shift Note    Bedside shift change report given to ZAY Tomas (oncoming nurse) by Gladys Coffman RN (offgoing nurse).  Report included the following information SBAR, Kardex, and MAR    Shift worked: 7a-7p     Shift summary and any significant changes:    Medications given per MAR and education provided. Q2hr turns completed. Comfort measures only ordered. Port care completed and dressing/needle changed. Plan is to discharge home on hospice tomorrow, 6/14 at 10AM.     Urology to follow up in the morning regarding frankel catheter placement or not.    Concerns for physician to address: N/A     Zone phone for oncoming shift:  [phone]       Activity:  Level of Assistance: Maximum assist, patient does 25-49%  Number times ambulated in hallways past shift: 0  Number of times OOB to chair past shift: 0    Cardiac:   Cardiac Monitoring: No      Cardiac Rhythm: Sinus rhythm    Access:  Current line(s): port accessed    Genitourinary:   Urinary Status: Voiding, External catheter    Respiratory:   O2 Device: Nasal cannula  Chronic home O2 use?: YES  Incentive spirometer at bedside: NO    GI:  Last BM (including prior to admit): 06/07/25  Current diet:  ADULT DIET; Regular  ADULT ORAL NUTRITION SUPPLEMENT; Lunch, Dinner; Frozen Oral Supplement  Passing flatus: YES    Pain Management:   Patient states pain is manageable on current regimen: YES    Skin:  Brant Scale Score: 14  Interventions: Wound Offloading (Prevention Methods): Bed, pressure reduction mattress, Elevate heels, Pillows, Repositioning, Turning    Patient Safety:  Fall Risk: Nursing Judgement-Fall Risk High(Add Comments): Yes  Fall Risk Interventions  Nursing Judgement-Fall Risk High(Add Comments): Yes  Toilet Every 2 Hours-In Advance of Need: Yes  Hourly Visual Checks: Awake, In chair, Confused  Fall Visual Posted: Socks, Armband, Fall sign posted  Room Door Open: Yes  Alarm On: Bed, Chair  Patient Moved Closer to Nursing Station: No    Active Consults:

## 2025-06-13 NOTE — PROGRESS NOTES
Nephrology Progress Note  [facility] [place] [facility]  [address], [address]  [place] [address]  Phone - [phone]  Fax - [phone]                   Patient: Francisca Norris                     YOB: 1940        Date- 6/13/2025                                     Admit Date: 6/2/2025   CC: Follow up for brianna          IMPRESSION & PLAN:   BRIANNA--ATN  Hyperkalemia  NGMA  Edema, fluid overload  Hypoxia  Lung cancer metastatic-small cell lung cancer  Neutropenia  UTI  Cirrhosis      PLAN-  Continue po bicarb  He is not a good longterm dialysis candidate  Diuretics PRN  D/w family  Consider comfort care     Subjective:  Interval History:   -  cr worse  Poor urine output    Objective:   Vitals:    06/13/25 0359 06/13/25 0745 06/13/25 0836 06/13/25 0843   BP: (!) 120/56 (!) 113/48     Pulse: 71 71 72 75   Resp: 18 20 18 18   Temp: 97.3 °F (36.3 °C) 98.1 °F (36.7 °C)     TempSrc: Oral Oral     SpO2: 100% 98% 98% 99%   Weight:       Height:          I/O last 3 completed shifts:  In: 884.4 [P.O.:100; Blood:784.4]  Out: 450 [Urine:450]  I/O this shift:  In: -   Out: 800 [Urine:800]      Physical exam:    GEN: NAD  NECK- no mass  RESP: No wheezing  CVS: S1,S2  RRR  NEURO:  Non focal  EXT: ++ Edema   PSYCH: Can't eval due to patient's current condition       Chart reviewed.         Pertinent Notes reviewed.       Data Review :  Lab Results   Component Value Date/Time     06/13/2025 04:25 AM    K 5.0 06/13/2025 04:25 AM     06/13/2025 04:25 AM    CO2 19 06/13/2025 04:25 AM     06/13/2025 04:25 AM    CREATININE 4.03 06/13/2025 04:25 AM    GLUCOSE 98 06/13/2025 04:25 AM    CALCIUM 8.0 06/13/2025 04:25 AM       Lab Results   Component Value Date    WBC 1.1 (L) 06/13/2025    HGB 7.1 (L) 06/13/2025    HCT 21.4 (L) 06/13/2025    MCV 89.2 06/13/2025    PLT 13 (LL) 06/13/2025      Recent Labs     06/11/25  0409 06/12/25  1224

## 2025-06-14 PROCEDURE — 94640 AIRWAY INHALATION TREATMENT: CPT

## 2025-06-14 PROCEDURE — 1100000000 HC RM PRIVATE

## 2025-06-14 PROCEDURE — 6360000002 HC RX W HCPCS: Performed by: INTERNAL MEDICINE

## 2025-06-14 PROCEDURE — 6370000000 HC RX 637 (ALT 250 FOR IP): Performed by: SURGERY

## 2025-06-14 PROCEDURE — 2700000000 HC OXYGEN THERAPY PER DAY

## 2025-06-14 PROCEDURE — 51702 INSERT TEMP BLADDER CATH: CPT

## 2025-06-14 PROCEDURE — 51798 US URINE CAPACITY MEASURE: CPT

## 2025-06-14 PROCEDURE — 94761 N-INVAS EAR/PLS OXIMETRY MLT: CPT

## 2025-06-14 RX ORDER — HEPARIN 100 UNIT/ML
300 SYRINGE INTRAVENOUS PRN
Status: DISCONTINUED | OUTPATIENT
Start: 2025-06-14 | End: 2025-06-14

## 2025-06-14 RX ORDER — HEPARIN 100 UNIT/ML
300 SYRINGE INTRAVENOUS PRN
Status: DISCONTINUED | OUTPATIENT
Start: 2025-06-14 | End: 2025-06-15 | Stop reason: HOSPADM

## 2025-06-14 RX ADMIN — Medication: at 09:09

## 2025-06-14 RX ADMIN — Medication: at 21:53

## 2025-06-14 RX ADMIN — ALBUTEROL SULFATE 2.5 MG: 2.5 SOLUTION RESPIRATORY (INHALATION) at 11:55

## 2025-06-14 RX ADMIN — ACETAMINOPHEN 650 MG: 325 TABLET ORAL at 12:30

## 2025-06-14 RX ADMIN — Medication: at 13:56

## 2025-06-14 RX ADMIN — ALBUTEROL SULFATE 2.5 MG: 2.5 SOLUTION RESPIRATORY (INHALATION) at 20:43

## 2025-06-14 RX ADMIN — ALBUTEROL SULFATE 2.5 MG: 2.5 SOLUTION RESPIRATORY (INHALATION) at 08:54

## 2025-06-14 RX ADMIN — ACETAMINOPHEN 650 MG: 325 TABLET ORAL at 21:54

## 2025-06-14 RX ADMIN — ALBUTEROL SULFATE 2.5 MG: 2.5 SOLUTION RESPIRATORY (INHALATION) at 15:41

## 2025-06-14 ASSESSMENT — PAIN DESCRIPTION - DESCRIPTORS
DESCRIPTORS: THROBBING
DESCRIPTORS: ACHING
DESCRIPTORS: ACHING

## 2025-06-14 ASSESSMENT — PAIN SCALES - GENERAL
PAINLEVEL_OUTOF10: 4
PAINLEVEL_OUTOF10: 0
PAINLEVEL_OUTOF10: 1
PAINLEVEL_OUTOF10: 0
PAINLEVEL_OUTOF10: 0
PAINLEVEL_OUTOF10: 3
PAINLEVEL_OUTOF10: 3

## 2025-06-14 ASSESSMENT — PAIN DESCRIPTION - LOCATION
LOCATION: ABDOMEN
LOCATION: HIP
LOCATION: ABDOMEN

## 2025-06-14 ASSESSMENT — PAIN DESCRIPTION - ORIENTATION
ORIENTATION: LOWER
ORIENTATION: RIGHT
ORIENTATION: LOWER

## 2025-06-14 ASSESSMENT — PAIN DESCRIPTION - ONSET: ONSET: ON-GOING

## 2025-06-14 ASSESSMENT — PAIN DESCRIPTION - FREQUENCY: FREQUENCY: INTERMITTENT

## 2025-06-14 ASSESSMENT — PAIN - FUNCTIONAL ASSESSMENT: PAIN_FUNCTIONAL_ASSESSMENT: PREVENTS OR INTERFERES SOME ACTIVE ACTIVITIES AND ADLS

## 2025-06-14 ASSESSMENT — PAIN DESCRIPTION - PAIN TYPE: TYPE: ACUTE PAIN

## 2025-06-14 NOTE — PLAN OF CARE
Problem: Skin/Tissue Integrity  Goal: Skin integrity remains intact  Description: 1.  Monitor for areas of redness and/or skin breakdown2.  Assess vascular access sites hourly3.  Every 4-6 hours minimum:  Change oxygen saturation probe site4.  Every 4-6 hours:  If on nasal continuous positive airway pressure, respiratory therapy assess nares and determine need for appliance change or resting period  6/13/2025 2344 by Genoveva Hahn, RN  Outcome: Not Progressing  Flowsheets (Taken 6/13/2025 2010)  Skin Integrity Remains Intact: Monitor for areas of redness and/or skin breakdown

## 2025-06-14 NOTE — PROGRESS NOTES
Pulmonary Progress Note    C/c: acute hypoxic respiratory failure, lung cancer     Assessment     Acute hypoxic respiratory failure  Extensive stage small cell carcinoma lung  Neutropenic sepsis  Severe thrombocytopenia  UTI  Liver mets from #2  Mediastinal lymphadenopathy, presumed mets  Acute kidney injury  Cirrhosis    Management plan    Patient on O2 3 LPM.  Now on hospice care.  Continue supplemental oxygen for comfort.  CTA of chest showed a right hilar mass that is compressing the right pulm artery and mediastinal lymphadenopathy. Liver mass biopsy showed small cell carcinoma  First dose of chemo last weekend with carbo etoposide  Pancytopenia, on Neupogen, very severe thrombocytopenia  New RUL pulmonary infiltrate and bibasilar infiltrates on chest x-ray 6/13.  Poor prognosis, patient now on hospice care.    Total time of encounter > 25 minutes            Hospital interval:    6/4 -patient lying comfortably in bed.  No events overnight.  Discussed plan with patient regarding bronchoscopy/EBUS.   6/5 -patient sitting comfortably in bed on 2 L supplemental oxygen, no distress.  6/6  -patient sitting up in bed with family at bedside, just got back from port placement.  6/7 - Lying comfortably in bed, about to start chemo. On 4L supplemental oxygen  6/8 - Sitting in bed resting quietly, no distress. Remains on 4L supplemental oxygen  6/9 -patient looking weak and lethargic.  On O2 4LPM  6/10- intermittent confusion, on O2 4LPM, denies shortness of breath or abdominal pain.  6/11-no complaints.  No hemoptysis.  On O2 4 LPM.  Neutropenic, platelets<6K.  Increasing right infrahilar infiltrate,  blood cultures no growth.  Head CT shows no acute findings  6/12-mild nasal bleed.  Nebulizers helping with breathing.  O2 needs unchanged.  Platelets <3K, neutropenic.  Procalcitonin> 3, urine growing GNR  6/13- on O2 4lpm, breathing unchanged.  No hemoptysis.  Hematuria noted.  Platelets 13K, transfused yesterday,  Hb <7 today, new right upper lobe infiltrate on chest x-ray today.  6/14-on O2 3 LPM, urology saw for hematuria.  Patient and family opted for hospice care    HPI: Francisca Norris is a 84 y.o.  male with PMHx significant for CAD, hypercholesterolemia, HTN, sciatica who presented to [facility] for complaints of weakness, back and leg pain. He complained he has had this pain for 2-3 weeks and is concerned about falling. CT imaging of the abdomen was done to rule out PE due to SOB requiring 2L nasal cannula. Imaging showed lung lesion with possible mets to the liver.     The patient was a smoker for about 25 years, he quit in 1993. Smoked about 2 ppd.    He worked in a grocery store   No personal or family history of lung disease    Review of Systems: All other systems have been reviewed and are negative except per HPI    Past Medical History:  Past Medical History:   Diagnosis Date    CAD (coronary artery disease)     Hypercholesterolemia     Hypertension     Sciatica     STEMI (ST elevation myocardial infarction) (HCC) 08/07/2018    RCA       Medications:  Current Facility-Administered Medications   Medication Dose Route Frequency Provider Last Rate Last Admin    heparin (PF) 100 UNIT/ML injection 300 Units  300 Units IntraVENous PRN Eliecer Jean-Baptiste MD        haloperidol lactate (HALDOL) injection 1 mg  1 mg IntraMUSCular Q1H PRN Yann Dumont APRN - CNP        morphine (PF) injection 1 mg  1 mg IntraVENous Q15 Min PRN Yann Dumont APRN - CNP        diazePAM (VALIUM) injection 2.5 mg  2.5 mg IntraVENous Q1H PRN Yann Dumont, BETO - THIERRY        glycopyrrolate (ROBINUL) injection 0.1 mg  0.1 mg IntraVENous Q1H PRN Yann Dumont APRN - CNP        balsum peru-castor oil (VENELEX) ointment   Topical TID Eliecer Jean-Baptiste MD   Given at 06/14/25 0909    albuterol (PROVENTIL) (2.5 MG/3ML) 0.083% nebulizer solution 2.5 mg  2.5 mg Nebulization 4x Daily RT Rekha Alcazar MD   2.5 mg at 06/14/25 0854

## 2025-06-14 NOTE — PROGRESS NOTES
Metastatic lung cancer on hospice  6/3 consult retention, and frankel placed for 350ccs PVR.  Since has been removed and PVR running about 400ccs w/o pain, and hematuria, thrombocytopenic (platelets 13), neutropenic WBC 1,1..   Creat recent baseline ~ 1.6, now 4.03 (pre-renal by BUN/creat), CT 6/2 without hydronephrosis.    Patient elected no frankel yesterday bu US after nursing failed placement..    Diaper full of clots  24fr 2x coude placed without difficulty after urethral lube  Drained ~600ccs thin bloody urine  Flushed to pink, without aspirated clots over about 300ccs.  He got releif from this.   Not much can be done to stop hematuria with platelets of 13  OK for hospice discharge with frankel/flushes

## 2025-06-14 NOTE — DISCHARGE SUMMARY
Discharge Summary    Name: Francisca Norris  [id number]  YOB: 1940 (Age: 84 y.o.)   Date of Admission: 6/2/2025  Date of Discharge: 6/14/2025  Attending Physician: Eliecer Jean-Baptiste MD    Discharge Diagnosis:   Acute on chronic hypoxic respiratory failure POA requiring oxygen supplementation- 4L/m  Due to Metastatic lung cancer with mets to the liver POA- Biopsy report consistent with small cell lung cancer POA-- Opted for HOSPICE at home as arranged on discharge  R hilar mass with compression of R pulmonary artery POA  Altered mental status, improved  Chemotherapy induced Pancytopenia- s/p neupogen x 3 in hospital  Severe Thrombocytopenia- s/p PLT transfusions  Urine retention with hematuria- cont frankel for comfort now at home with hospice  Cirrhosis, transaminitis POA  Due to Liver lesions concerning for metastatic disease POA  BRIANNA  HTN  HLD  DNR    Consultations:  IP CONSULT TO PALLIATIVE CARE  IP CONSULT TO ONCOLOGY  IP CONSULT TO INTERVENTIONAL RADIOLOGY  IP CONSULT TO PULMONOLOGY  IP CONSULT TO UROLOGY  IP CONSULT TO GENERAL SURGERY  IP CONSULT TO DIETITIAN  IP CONSULT TO NEPHROLOGY  IP CONSULT TO PALLIATIVE CARE  IP CONSULT TO CASE MANAGEMENT  IP CONSULT TO CASE MANAGEMENT      Brief Admission History/Reason for Admission Per Trell Rivero MD:   \"84 y.o.  male with PMHx significant for CAD, hypercholesterolemia, HTN, sciatica. Patient presented to [facility] Emergency department with chief complaint of generalized weakness, right back pain, and leg pain that has been worsening for the past 2 to 3 weeks.  He reportedly was worried about falling due to the pain.  Reported his leg and back pain started years ago and he had surgery in 2022, pain had resolved until the past 2 to 3 weeks.  Patient lives alone and typically maintains his own ADLs.  Family reported he appeared short of breath with minimal activity.  Oxygen saturation 88-90s, with

## 2025-06-14 NOTE — PROGRESS NOTES
End of Shift Note    Bedside shift change report given to ZAY Brown (oncoming nurse) by Genoveva Hahn RN (offgoing nurse).  Report included the following information SBAR, Kardex, Intake/Output, and MAR    Shift worked:  1900 - 0700     Shift summary and any significant changes:    Medications administered per Mar, education provided.  Patient denies any pain or discomfort and no s/s of discomfort.  Patient declines repositioning.  Manwick in place, urine red, denies feeling distended, feels he is emptying his bladder.  Bladder scan showed 485 ml in his bladder approximately 30 min after urinating.  Urology to see this am to assess need for frankel.  Manwick leaked, large blood clot noted with cares.  Bath given, patient also had a large incontinent BM.  Caring rounds completed.     Concerns for physician to address:       Zone phone for oncoming shift:          Activity:  Level of Assistance: Maximum assist, patient does 25-49%    Cardiac:   Cardiac Monitoring:  no    Access:  Current line(s): port accessed    Genitourinary:   Urinary Status: Voiding, External catheter    Respiratory:   O2 Device: Nasal cannula    GI:  Current diet: ADULT DIET; Regular  ADULT ORAL NUTRITION SUPPLEMENT; Lunch, Dinner; Frozen Oral Supplement    Pain Management:   Patient states pain is manageable on current regimen: YES    Skin:  Brant Scale Score: 14  Interventions: Wound Offloading (Prevention Methods): Bed, pressure reduction mattress, Elevate heels, Pillows, Repositioning, Turning, Other (comment) (patient will turn occasionally, mostly refuses repositioning)  Pressure injury: yes - blanchable sacrum and heels    Patient Safety:  Fall Score: Aguero Total Score: 75  Fall Risk Interventions  Nursing Judgement-Fall Risk High(Add Comments): Yes  Toilet Every 2 Hours-In Advance of Need: Yes  Hourly Visual Checks: In bed, Eyes closed  Fall Visual Posted: Armband, Fall sign posted, Socks  Room Door Open: Yes  Alarm On: Bed  Patient

## 2025-06-14 NOTE — PROGRESS NOTES
End of Shift Note    Bedside shift change report given to ZAY Tomas (oncoming nurse) by Stephanie Cash RN (offgoing nurse).  Report included the following information SBAR, Kardex, Intake/Output, MAR, and Recent Results    Shift worked:  7 am to 7:30 pm     Shift summary and any significant changes:      All scheduled medications administered. 1 dose PRN Tylenol administered for right hip pain, see MAR. Denied nausea.     Frankel placed at 0942 per Urologist for comfort and retention. Per orders, manually irrigated frankel once at 1343 to remove blood clots. Urine output documented and frankel care completed.    Repositioning provided and heels elevated. Incontinence care and gown change provided; had 2 bowel movements during shift. New foam dressing and Venelex applied to right buttock wound. Port flushed with positive blood return and wiped with CHG. Patient assisted with all meals with poor appetite; mostly drank water. Patient's daughter called and was provided with an update per RN. Bed alarm engaged. Nursing rounds and education completed.      Concerns for physician to address:  Patient to discharge home tomorrow with hospice pending hospital bed delivery to patient's home.     Zone phone for oncoming shift:   [phone]       Activity:  Level of Assistance: Maximum assist, patient does 25-49%  Number times ambulated in hallways past shift: 0  Number of times OOB to chair past shift: 0    Cardiac:   Cardiac Monitoring: No      Cardiac Rhythm: Sinus rhythm    Access:  Current line(s): port accessed    Genitourinary:   Urinary Status: Frankel    Respiratory:   O2 Device: Nasal cannula  Chronic home O2 use?: NO  Incentive spirometer at bedside: NO    GI:  Last BM (including prior to admit): 06/14/25  Current diet:  ADULT DIET; Regular  ADULT ORAL NUTRITION SUPPLEMENT; Lunch, Dinner; Frozen Oral Supplement  Passing flatus: YES    Pain Management:   Patient states pain is manageable on current regimen: YES    Skin:  Brant

## 2025-06-14 NOTE — PLAN OF CARE
Problem: Skin/Tissue Integrity  Goal: Skin integrity remains intact  Description: 1.  Monitor for areas of redness and/or skin breakdown2.  Assess vascular access sites hourly3.  Every 4-6 hours minimum:  Change oxygen saturation probe site4.  Every 4-6 hours:  If on nasal continuous positive airway pressure, respiratory therapy assess nares and determine need for appliance change or resting period  Outcome: Not Progressing  Flowsheets  Taken 6/14/2025 0750 by Stephanie Cash RN  Skin Integrity Remains Intact:   Assess vascular access sites hourly   Monitor for areas of redness and/or skin breakdown   Turn and reposition as indicated  Taken 6/14/2025 0240 by Genoveva Hahn RN  Skin Integrity Remains Intact: Monitor for areas of redness and/or skin breakdown     Problem: Safety - Adult  Goal: Free from fall injury  Outcome: Progressing  Flowsheets (Taken 6/14/2025 0240 by Genoveva Hahn RN)  Free From Fall Injury: Instruct family/caregiver on patient safety     Problem: Pain  Goal: Verbalizes/displays adequate comfort level or baseline comfort level  Outcome: Progressing  Flowsheets  Taken 6/14/2025 0750 by Stephanie Cash RN  Verbalizes/displays adequate comfort level or baseline comfort level:   Encourage patient to monitor pain and request assistance   Assess pain using appropriate pain scale   Administer analgesics based on type and severity of pain and evaluate response   Implement non-pharmacological measures as appropriate and evaluate response  Taken 6/14/2025 0240 by Genoveva Hahn RN  Verbalizes/displays adequate comfort level or baseline comfort level: Assess pain using appropriate pain scale     Problem: ABCDS Injury Assessment  Goal: Absence of physical injury  Outcome: Progressing  Flowsheets (Taken 6/14/2025 0240 by Genoveva Hahn RN)  Absence of Physical Injury: Implement safety measures based on patient assessment     Problem: Respiratory - Adult  Goal: Achieves optimal ventilation and

## 2025-06-14 NOTE — PLAN OF CARE
Problem: Skin/Tissue Integrity  Goal: Skin integrity remains intact  Description: 1.  Monitor for areas of redness and/or skin breakdown2.  Assess vascular access sites hourly3.  Every 4-6 hours minimum:  Change oxygen saturation probe site4.  Every 4-6 hours:  If on nasal continuous positive airway pressure, respiratory therapy assess nares and determine need for appliance change or resting period  Outcome: Not Progressing

## 2025-06-15 VITALS
OXYGEN SATURATION: 98 % | DIASTOLIC BLOOD PRESSURE: 59 MMHG | BODY MASS INDEX: 24.49 KG/M2 | WEIGHT: 165.34 LBS | HEIGHT: 69 IN | RESPIRATION RATE: 22 BRPM | TEMPERATURE: 99.3 F | HEART RATE: 80 BPM | SYSTOLIC BLOOD PRESSURE: 118 MMHG

## 2025-06-15 LAB
BACTERIA SPEC CULT: NORMAL
BACTERIA SPEC CULT: NORMAL
SERVICE CMNT-IMP: NORMAL
SERVICE CMNT-IMP: NORMAL

## 2025-06-15 PROCEDURE — 6370000000 HC RX 637 (ALT 250 FOR IP): Performed by: SURGERY

## 2025-06-15 PROCEDURE — 51700 IRRIGATION OF BLADDER: CPT

## 2025-06-15 PROCEDURE — 2700000000 HC OXYGEN THERAPY PER DAY

## 2025-06-15 PROCEDURE — 94640 AIRWAY INHALATION TREATMENT: CPT

## 2025-06-15 PROCEDURE — 6360000002 HC RX W HCPCS

## 2025-06-15 PROCEDURE — 6360000002 HC RX W HCPCS: Performed by: INTERNAL MEDICINE

## 2025-06-15 PROCEDURE — 51702 INSERT TEMP BLADDER CATH: CPT

## 2025-06-15 RX ADMIN — MORPHINE SULFATE 1 MG: 2 INJECTION, SOLUTION INTRAMUSCULAR; INTRAVENOUS at 06:25

## 2025-06-15 RX ADMIN — ALBUTEROL SULFATE 2.5 MG: 2.5 SOLUTION RESPIRATORY (INHALATION) at 08:47

## 2025-06-15 RX ADMIN — HEPARIN 300 UNITS: 100 SYRINGE at 12:39

## 2025-06-15 RX ADMIN — Medication: at 08:53

## 2025-06-15 RX ADMIN — ACETAMINOPHEN 650 MG: 325 TABLET ORAL at 05:51

## 2025-06-15 ASSESSMENT — PAIN DESCRIPTION - DESCRIPTORS
DESCRIPTORS: ACHING;THROBBING
DESCRIPTORS: ACHING;SHARP

## 2025-06-15 ASSESSMENT — PAIN DESCRIPTION - LOCATION
LOCATION: ABDOMEN
LOCATION: ABDOMEN

## 2025-06-15 ASSESSMENT — PAIN SCALES - GENERAL
PAINLEVEL_OUTOF10: 7
PAINLEVEL_OUTOF10: 4
PAINLEVEL_OUTOF10: 3
PAINLEVEL_OUTOF10: 7
PAINLEVEL_OUTOF10: 0
PAINLEVEL_OUTOF10: 1

## 2025-06-15 ASSESSMENT — PAIN DESCRIPTION - ORIENTATION
ORIENTATION: LOWER
ORIENTATION: LOWER

## 2025-06-15 NOTE — PROGRESS NOTES
Metastatic lung cancer on hospice  6/3 consult retention, and frankel placed for 350ccs PVR.  Since has been removed and PVR running about 400ccs w/o pain, but hematuria, thrombocytopenic (platelets 13), neutropenic WBC 1,1..   Creat recent baseline ~ 1.6, now 4.03 (pre-renal by BUN/creat), CT 6/2 without hydronephrosis.  24 frankel placed yesterday.    No labs today.    Bladder scan this AM 600ccs, clot in diaper around cath. Bladder distended.  I was told this is a recent (last shift) issue.  Cath does not flush/aspirate, removed, and clots shot out urethra.  Replaced 24fr 3x, drained ~ 600cc, but still could to flush/aspirate.  Change to 24fr 3x hematuria cath, clears about 500cc clots, and flushed to pink. CBI begun.    Not much can be done to stop hematuria with sever thrombocytopenia.  Frankel/CBI is for palliative and will required pretty diligent hospice care - ongoing CBI/manual flushes, etc. At high risk for cath occlusions requiring exchange, manual clot evacs, etc.

## 2025-06-15 NOTE — CARE COORDINATION
CASSANDRA staffed case clinical team regarding pts d/c needs and plans.  CM informed that pt will d/c within 24hrs due to medical stability.  Pt will transition to snf for placement at the time of d/c.: [facility].  CASSANDRA spoke with 3 daughters at bedside regarding plans for d/c on 6/10/25.  Pts daughter agreeable to following plan.  Pt will require medical transport.    RYAN Garcia CM  [phone]    
CM informed that pt will remain as inpatient for 48hrs due to Onc Clearance and medical stability.      Once medically stable pt will transition to snf: [facility].    RYAN Garcia   [phone]    
CM staffed case with Onc physician, regarding d/c plans and needs.  CM informed that pt and family deciding to move forward with plan of receiving chemo, and then wanting pt to transition to [facility], for cont'd care.    CM will send referral to snf, for review.  Admin coordinator from snf, for room visit.      RYAN Garcia CM  [phone]    
Care Management Initial Assessment       RUR: 16%  Readmission? No  1st IM letter given? Yes   1st  letter given: No    CM completed assessment with pts family at bedside.  Pt is known to reside alone, with family to assist with care.  Pt known to need limited assistance with ADLs and doesn't drive.  Pt was using limited DME at home.  Pt currently using O2-doesn't wear it at baseline.    Pts family are currently deciding if they are wanting pt to start chemo this admission or transition to snf to get rehab to get stronger before transitioning home on hospice.    RYAN Garcia CM  [phone]       06/05/25 1228   Service Assessment   Patient Orientation Other (see comment)   Cognition Alert   History Provided By Patient   Primary Caregiver Family   Support Systems Children;Family Members   PCP Verified by CM Yes   Last Visit to PCP Within last 3 months   Prior Functional Level Assistance with the following:;Bathing;Dressing;Toileting;Cooking;Feeding;Housework;Shopping;Mobility   Current Functional Level Mobility;Shopping;Housework;Feeding;Cooking;Toileting;Dressing;Bathing;Assistance with the following:   Can patient return to prior living arrangement Yes   Ability to make needs known: Poor   Family able to assist with home care needs: Yes   Would you like for me to discuss the discharge plan with any other family members/significant others, and if so, who? Yes   Financial Resources Medicare   Social/Functional History   Lives With Alone   Type of Home House   Active  No   Mode of Transportation Family   Discharge Planning   Type of Residence House   Living Arrangements Alone   Patient expects to be discharged to: House       
D/C Home with [facility]  [facility] Transport on Sun, 6/15 @ 11:00AM ([facility] Transport Status)    8:35AM UPDATE  Weekend CM contacted [facility] ([phone]) to confirm d/c for today. Message left with call staff, will call back writer.     9:10AM UPDATE  CM received call back from On-Call Nurse w/ [facility] ([phone]) who reported that they have no record of pt's admission for this weekend and have no availability to admit. Report it appears episode of care was entered on their end, but no documentation available. CM advised that from notes yesterday, rep from agency had confirmed pt was able to admit this morning. On-Call Nurse advised unsure whom they spoke with but they can't pull anything.     CM called pt's daughter (Genoveva, [phone]) to discuss. Reports she did speak with a Nurse from [facility] yesterday (Julissa, [phone]) who had scheduled nurse visit today for admission at 12:30PM. However, daughter concerned as they had not been able to secure/deliver a hospital bed yet. Oxygen has been delivered but that's it. CM advised will reach out to hospice agency again to figure out plan, as conflicting information being provided and concerns without hospital bed delivery.     CM called [facility] ([phone]). Message left with on-call staff again. CM called [facility] ([phone]) and placed transport for 10:00AM back on Will Call.     9:25AM UPDATE  CM spoke with [facility] Nurse (Julissa Lisbeth, [phone]) who reported that they did have pt scheduled for 12:30PM admission today at home. CM advised unclear as to why other on-call staff had told writer no evidence of admission today. Still unclear as to the miscommunication, but did report they are still available to admit pt today. CM advised Ms. Bob of pt's concerns related to not having a hospital bed delivered yet. Report the DME company they normally use won't have one until sometime next week. She is 
Discharge Today - Home w/ [facility]    [facility] Transport - Pick-up @ 11:00AM (ETA can be checked via [facility] Transport Order Status)     Transition of Care Plan:    RUR: 17%  Prior Level of Functioning: Assistance  Disposition: Home w/ [facility]  ORALIA: 6/15  Follow up appointments: Defer to Hospice  DME needed: Arranged/Delivered by Hospice  Transportation at discharge: [facility] @ 11:00AM  IM/IMM Medicare/ letter given: Given 6/13  Is patient a  and connected with VA? N/a   If yes, was [place] transfer form completed and VA notified? N/a  Caregiver Contact: Genoveva Mccullough, daughter ([phone])  Discharge Caregiver contacted prior to discharge? Yes  Care Conference needed? No  Barriers to discharge: None     06/15/25 0749   Services At/After Discharge   Transition of Care Consult (CM Consult) Hospice;Discharge Planning;Transportation Assistance  ([facility], [facility])   Internal Hospice No   Reason Outside Agency Chosen Out of service area   Services At/After Discharge DME;Transport;In ambulance;Hospice  (DME arranged by hospice)   Mode of Transport at Discharge [facility]   Hospital Transport Time of Discharge 1100   Confirm Follow Up Transport Other (see comment)  (defer to hospice)   Condition of Participation: Discharge Planning   The Plan for Transition of Care is related to the following treatment goals: [facility], [facility]   The Patient and/or Patient Representative was provided with a Choice of Provider? Patient Representative   Name of the Patient Representative who was provided with the Choice of Provider and agrees with the Discharge Plan?  Genoveva Mccullough   The Patient and/Or Patient Representative agree with the Discharge Plan? Yes   Freedom of Choice list was provided with basic dialogue that supports the patient's individualized plan of care/goals, treatment preferences, and shares the quality data associated with the 
Pt is currently out of the room for testing.    RYAN Garcia   [phone]    
UPDATE: 1:00PM    CASSANDRA informed that [facility] are able to admit pt on 6/14/25 at 10A.  [facility] liaision will make contact with pts daughter to determine if DME is needed at the time of d/c.    CM will continue to follow.    RYAN Garcia CM  [phone]        UPDATE: 12:13P    CM spoke with rep from [facility], via telephone: [phone], regarding pts referral.  Rep reported that she is unable to open attached referrals, and if CM could faxx clinicals to her: [phone].  CM completed request.    CM informed by hospice rep that they could potentially accept pt on Saturday (late afternoon) or pt can transition home on Saturday, then a nurse will be to pts room on Sunday to admit for hospice.    CM currently pending a response from hospice nurse, and for nurse to make contact with pts family.  CM will continue to follow.    RYAN Garcia CM  [phone]            INITIAL NOTE: CM staffed case with clinical team, and pts family are wanting to move forward with hospice.  CM completed bedside visit with pts and daughters.  Daughters are all agreeable with transitioning pt home with hospice.  Daughters selected [facility] to assist with pts care.      CM sent referral for hospice, via [facility].  Pending response.    RYAN Garcia CM  [phone]    
full weight-bearing

## 2025-06-15 NOTE — PROGRESS NOTES
End of Shift Note    Bedside shift change report given to ZAY Brown (oncoming nurse) by Genoveva Hahn RN (offgoing nurse).  Report included the following information SBAR, Kardex, Intake/Output, and MAR    Shift worked:  1900 - 0700     Shift summary and any significant changes:     Medications administered per MAR, education provided.  PRN Acetaminophen administered for lower abdominal pain with good effect.  Frankel not draining well, irrigated as ordered, patient unable to tolerate more than 30 ml. Aspirate with irrigation syringe, large and small clots obtained.  Patient continues to drain around his frankel, bloody drainage with clots noted.  Last round, able to irrigate frankel, unable to aspirate any liquid or clots.  Patients abdomen now hard and distended.  Call placed to on call urologist Dr Sadler, no new orders, was informed Dr Payne wound round within the hour.  Bladder scan showed >593 ml in bladder.  Patient did agree to morphine, administered per PRN order.  Dr Payne arrived, differ to his notations. Patient more comfortable after MD completed irrigations and connected patient to CBI.  Pain a 1 per patient, abdomen soft, non-distended.  Caring rounds completed.   Concerns for physician to address:       Zone phone for oncoming shift:          Activity:  Level of Assistance: Maximum assist, patient does 25-49%    Cardiac:   Cardiac Monitoring:  no    Access:  Current line(s): port accessed    Genitourinary:   Urinary Status: Frankel    Respiratory:   O2 Device: Nasal cannula    GI:  Current diet: ADULT DIET; Regular  ADULT ORAL NUTRITION SUPPLEMENT; Lunch, Dinner; Frozen Oral Supplement    Pain Management:   Patient states pain is manageable on current regimen: YES    Skin:  Brant Scale Score: 14  Interventions: Wound Offloading (Prevention Methods): Bed, pressure reduction mattress, Pillows, Repositioning, Turning, Elevate heels  Pressure injury: yes - sacrum    Patient Safety:  Fall Score: Aguero

## 2025-06-15 NOTE — PLAN OF CARE
Problem: Safety - Adult  Goal: Free from fall injury  6/15/2025 1328 by Stephanie Cash RN  Outcome: Adequate for Discharge  Flowsheets (Taken 6/15/2025 0230 by Genoveva Hahn RN)  Free From Fall Injury: Instruct family/caregiver on patient safety  6/14/2025 2359 by Genoveva Hahn RN  Outcome: Progressing  Flowsheets (Taken 6/14/2025 2044)  Free From Fall Injury: Instruct family/caregiver on patient safety     Problem: Pain  Goal: Verbalizes/displays adequate comfort level or baseline comfort level  6/15/2025 1328 by Stephanie Cash RN  Outcome: Adequate for Discharge  Flowsheets  Taken 6/15/2025 0853 by Stephanie Cash RN  Verbalizes/displays adequate comfort level or baseline comfort level:   Encourage patient to monitor pain and request assistance   Assess pain using appropriate pain scale   Administer analgesics based on type and severity of pain and evaluate response   Implement non-pharmacological measures as appropriate and evaluate response  Taken 6/15/2025 0625 by Genoveva Hahn RN  Verbalizes/displays adequate comfort level or baseline comfort level: Assess pain using appropriate pain scale  Taken 6/15/2025 0230 by Genoveva Hahn RN  Verbalizes/displays adequate comfort level or baseline comfort level: Assess pain using appropriate pain scale  6/14/2025 2359 by Genoveva Hahn RN  Outcome: Progressing  Flowsheets (Taken 6/14/2025 2044)  Verbalizes/displays adequate comfort level or baseline comfort level: Assess pain using appropriate pain scale     Problem: Skin/Tissue Integrity  Goal: Skin integrity remains intact  Description: 1.  Monitor for areas of redness and/or skin breakdown2.  Assess vascular access sites hourly3.  Every 4-6 hours minimum:  Change oxygen saturation probe site4.  Every 4-6 hours:  If on nasal continuous positive airway pressure, respiratory therapy assess nares and determine need for appliance change or resting period  6/15/2025 1328 by Stephanie Cash  and determine need for appliance change or resting period  6/15/2025 1328 by Stephanie Cash RN  Outcome: Adequate for Discharge  Flowsheets  Taken 6/15/2025 0853 by Stephanie Cash RN  Skin Integrity Remains Intact:   Monitor for areas of redness and/or skin breakdown   Assess vascular access sites hourly   Turn and reposition as indicated  Taken 6/15/2025 0230 by Genoveva Hahn RN  Skin Integrity Remains Intact: Monitor for areas of redness and/or skin breakdown  6/14/2025 2359 by Genoveva Hahn RN  Outcome: Not Progressing  Flowsheets (Taken 6/14/2025 2044)  Skin Integrity Remains Intact: Monitor for areas of redness and/or skin breakdown

## 2025-06-15 NOTE — DISCHARGE SUMMARY
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-- Opted for HOSPICE at home as arranged on discharge  R hilar mass with compression of R pulmonary artery POA  Altered mental status, improved  Chemotherapy induced Pancytopenia- s/p neupogen x 3 in hospital  Severe Thrombocytopenia- s/p PLT transfusions  Urine retention with hematuria- cont frankel for comfort now at home with hospice  Cirrhosis, transaminitis POA  Due to Liver lesions concerning for metastatic disease POA  BRIANNA  HTN  HLD  DNR    Consultations:  IP CONSULT TO PALLIATIVE CARE  IP CONSULT TO ONCOLOGY  IP CONSULT TO INTERVENTIONAL RADIOLOGY  IP CONSULT TO PULMONOLOGY  IP CONSULT TO UROLOGY  IP CONSULT TO GENERAL SURGERY  IP CONSULT TO DIETITIAN  IP CONSULT TO NEPHROLOGY  IP CONSULT TO PALLIATIVE CARE  IP CONSULT TO CASE MANAGEMENT  IP CONSULT TO CASE MANAGEMENT      Brief Admission History/Reason for Admission Per Trell Rivero MD:   \"84 y.o.  male with PMHx significant for CAD, hypercholesterolemia, HTN, sciatica. Patient presented to [facility] Emergency department with chief complaint of generalized weakness, right back pain, and leg pain that has been worsening for the past 2 to 3 weeks.  He reportedly was worried about falling due to the pain.  Reported his leg and back pain started years ago and he had surgery in 2022, pain had resolved until the past 2 to 3 weeks.  Patient lives alone and typically maintains his own ADLs.  Family reported he appeared short of breath with minimal activity.  Oxygen saturation 88-90s, with  until further BP trends     Hyperlipidemia-  Cont holding home atorvastatin with LFTs     Sciatica    Discharge Exam:  Patient seen and examined by me on discharge day.  Pertinent Findings:  Patient Vitals for the past 24 hrs:   BP Temp Temp src Pulse Resp SpO2   06/15/25 0847 -- -- -- -- -- 100 %   06/15/25 0655 -- -- -- -- 20 --   06/15/25 0625 -- -- -- -- 20 --   06/15/25 0230 -- -- -- -- 20 --   06/14/25 2051 -- -- -- 92 20 100 %   06/14/25 2044 123/75 97.2 °F (36.2 °C) Oral (!) 110 20 91 %   06/14/25 2043 -- -- -- 100 20 93 %   06/14/25 1552 -- -- -- -- -- 99 %   06/14/25 1542 -- -- -- -- -- 98 %   06/14/25 1202 -- -- -- -- -- 99 %   06/14/25 1155 -- -- -- -- -- 100 %   06/14/25 0907 -- -- -- -- -- 97 %       Gen:    Not in distress  Chest: Clear lungs  CVS:   Regular rhythm.  No edema  Abd:  Soft, not distended, not tender  Neuro: awake, moving all exts    Discharge/Recent Laboratory Results:  Recent Labs     06/13/25  0425      K 5.0   *   CO2 19*   *   CREATININE 4.03*   GLUCOSE 98   CALCIUM 8.0*   PHOS 10.5*   MG 2.4     Recent Labs     06/13/25  0425   HGB 7.1*   HCT 21.4*   WBC 1.1*   PLT 13*       Discharge Medications:     Medication List        STOP taking these medications      aspirin 81 MG EC tablet  Commonly known as: Aspirin Adult Low Strength     atorvastatin 40 MG tablet  Commonly known as: LIPITOR     hydroCHLOROthiazide 25 MG tablet  Commonly known as: HYDRODIURIL     losartan 25 MG tablet  Commonly known as: COZAAR     metoprolol succinate 25 MG extended release tablet  Commonly known as: TOPROL XL     nitroGLYCERIN 0.4 MG SL tablet  Commonly known as: NITROSTAT                  DISPOSITION:    Home with Family:   X with [facility]    Home with HH/PT/OT/RN:    SNF/LTC:    TATUM:    OTHER:            Code status: DNR  Recommended diet: COMFORT DIET  Recommended activity: bedrest        Follow up with: [facility] as arranged        Total time in minutes

## 2025-06-15 NOTE — PROGRESS NOTES
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 No CT evidence for pulmonary embolus at this time. Extrinsic compression of the right pulmonary arterial system and the right pulmonary veins by a right hilar mass.  2. Right hilar mass or clustered lymph nodes.  3. Mediastinal lymphadenopathy.  4. Incidental upper abdominal findings previously described.     : Patient was not oriented to time place and person, was on 2 L of oxygen the MAP was on the low side.  500 mL of IV fluid given.  Nuclear med bone scan ordered.  Will follow-up on the liver biopsy result.   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  Oncology has a family meeting today family need to choose between starting the chemotherapy while inpatient versus hospice.  NM bone scan with no significant findings  On : S/p left subclavian vein port insertion by general surgery.  As per oncology-patient would like to pursue 1 cycle of carbo etoposide.  Plan to proceed on  after port placement.  Patient will continue to stay till  for final dose of etoposide.  Discussed with CM plan to transition to SNF on  after completion of chemotherapy.  Patient is considering transition to hospice based on how he tolerates cycle 1.  : Tolerating chemotherapy.  Patient denies acute distress, currently on 4 L of oxygen  : Finished chemotherapy today, on 4 L of

## 2025-07-07 PROBLEM — Z51.11 ENCOUNTER FOR ANTINEOPLASTIC CHEMOTHERAPY: Status: RESOLVED | Noted: 2025-06-07 | Resolved: 2025-07-07

## 2025-07-15 NOTE — PROGRESS NOTES
Physician Progress Note      PATIENT:               LYUDMILA LOERA  CSN #:                  [id number]  :                       1940  ADMIT DATE:       2025 10:08 PM  DISCH DATE:        6/15/2025 1:30 PM  RESPONDING  PROVIDER #:        Eliecer Jean-Baptiste MD          QUERY TEXT:    Please clarify the patient?s nutritional status:    The clinical indicators include:   Comprehensive Nutrition Assessment  Malnutrition Assessment:  Malnutrition Status:  Moderate malnutrition (25 1440)  Context:  Acute Illness  Findings of the 6 clinical characteristics of malnutrition:  Energy Intake:  50% or less of estimated energy requirements for 5 or more   days  Weight Loss:  No weight loss  Body Fat Loss:  No body fat loss  Muscle Mass Loss:  Mild muscle mass loss Temples (temporalis), Clavicles   (pectoralis & deltoids)  Fluid Accumulation:  No fluid accumulation   Strength:  Not Performed    Nutrition Recommendations/Plan:  Continue current diet  Encourage PO intakes, honor preferences as able, provide assistance PRN  Magic cup 2x/day (dislikes ensure)  Monitor and record PO intakes, supplement acceptance, and Bms in I/Os  Options provided:  -- Protein calorie malnutrition moderate  -- Other - I will add my own diagnosis  -- Disagree - Not applicable / Not valid  -- Disagree - Clinically unable to determine / Unknown  -- Refer to Clinical Documentation Reviewer    PROVIDER RESPONSE TEXT:    This patient has moderate protein calorie malnutrition.    Query created by: April Rojas on 2025 3:14 PM      Electronically signed by:  Eliecer Jean-Baptiste MD 7/15/2025 12:57 PM

## (undated) DEVICE — HYPODERMIC SAFETY NEEDLE: Brand: MONOJECT

## (undated) DEVICE — Device: Brand: JELCO

## (undated) DEVICE — SUTURE PROL 2-0 L48IN NONABSORBABLE BLU SH L26MM 1/2 CIR 8533H

## (undated) DEVICE — SOLUTION IV 500 ML 0.9 NACL INJ EXCEL CONTAINER USP LF

## (undated) DEVICE — GLOVE ORANGE PI 7 1/2   MSG9075

## (undated) DEVICE — DECANTER BAG 9": Brand: MEDLINE INDUSTRIES, INC.

## (undated) DEVICE — ELECTRODE PT RET AD L9FT HI MOIST COND ADH HYDRGEL CORDED

## (undated) DEVICE — DRAPE C ARM W/ POLY STRP W42XL72IN FOR MOB XR

## (undated) DEVICE — GOWN,SIRUS,NONRNF,SETINSLV,2XL,18/CS: Brand: MEDLINE

## (undated) DEVICE — APPLICATOR MEDICATED 26 CC SOLUTION HI LT ORNG CHLORAPREP

## (undated) DEVICE — SUTURE VICRYL + SZ 4-0 L27IN ABSRB UD PS-2 3/8 CIR REV CUT VCP426H

## (undated) DEVICE — SYRINGE MED 10ML LUERLOCK TIP W/O SFTY DISP

## (undated) DEVICE — SYRINGE 20ML LL S/C 50

## (undated) DEVICE — MAJOR LAPAROTOMY-MRMC: Brand: MEDLINE INDUSTRIES, INC.

## (undated) DEVICE — BLADE CLIPPER GEN PURP NS

## (undated) DEVICE — TOWEL,OR,DSP,ST,BLUE,STD,4/PK,20PK/CS: Brand: MEDLINE

## (undated) DEVICE — LIQUIBAND RAPID ADHESIVE 36/CS 0.8ML: Brand: MEDLINE

## (undated) DEVICE — SHEET, T, LAPAROTOMY, STERILE: Brand: MEDLINE